# Patient Record
Sex: MALE | Race: WHITE | NOT HISPANIC OR LATINO
[De-identification: names, ages, dates, MRNs, and addresses within clinical notes are randomized per-mention and may not be internally consistent; named-entity substitution may affect disease eponyms.]

---

## 2018-07-23 ENCOUNTER — RX RENEWAL (OUTPATIENT)
Age: 68
End: 2018-07-23

## 2018-07-23 ENCOUNTER — APPOINTMENT (OUTPATIENT)
Dept: HEART AND VASCULAR | Facility: CLINIC | Age: 68
End: 2018-07-23
Payer: MEDICARE

## 2018-07-23 ENCOUNTER — LABORATORY RESULT (OUTPATIENT)
Age: 68
End: 2018-07-23

## 2018-07-23 VITALS — DIASTOLIC BLOOD PRESSURE: 78 MMHG | SYSTOLIC BLOOD PRESSURE: 102 MMHG | RESPIRATION RATE: 12 BRPM | HEART RATE: 88 BPM

## 2018-07-23 DIAGNOSIS — Z78.9 OTHER SPECIFIED HEALTH STATUS: ICD-10-CM

## 2018-07-23 PROCEDURE — 99214 OFFICE O/P EST MOD 30 MIN: CPT

## 2018-07-23 NOTE — HISTORY OF PRESENT ILLNESS
[FreeTextEntry8] : 67-year-old male with a past medical history of BPH reports one day of dysuria, urinary frequency and burning. No fever. Patient reports the burning is in the rectum. Patient has been seen before by urology for this.

## 2018-07-23 NOTE — PHYSICAL EXAM
[No Acute Distress] : no acute distress [Well Nourished] : well nourished [Normal Sclera/Conjunctiva] : normal sclera/conjunctiva [Normal Outer Ear/Nose] : the outer ears and nose were normal in appearance [No Respiratory Distress] : no respiratory distress  [Clear to Auscultation] : lungs were clear to auscultation bilaterally [No Accessory Muscle Use] : no accessory muscle use [Normal Rate] : normal rate  [Regular Rhythm] : with a regular rhythm [Normal S1, S2] : normal S1 and S2 [No Stool to Guaiac] : no stool to guaiac [No Joint Swelling] : no joint swelling [Normal Gait] : normal gait [Coordination Grossly Intact] : coordination grossly intact [Speech Grossly Normal] : speech grossly normal [Normal Mood] : the mood was normal [FreeTextEntry1] : very enlarged non-tender prostate

## 2018-07-23 NOTE — PLAN
[FreeTextEntry1] : 1. Dysuria: Will get a urinalysis and urine culture and advise. Will prescribe levofloxacin 500 mg once a day. Advised to followup with urology\par 2. BPH: Flomax unlikely to be enough, would consider adding Proscar however concerned about lowering the blood pressure too much. Will first few with antibiotics and consider adding a second alpha blocker once infection resolves

## 2018-07-24 LAB
APPEARANCE: ABNORMAL
BILIRUBIN URINE: ABNORMAL
BLOOD URINE: ABNORMAL
COLOR: ABNORMAL
GLUCOSE QUALITATIVE U: NEGATIVE MG/DL
KETONES URINE: NEGATIVE
LEUKOCYTE ESTERASE URINE: ABNORMAL
NITRITE URINE: NEGATIVE
PH URINE: 5
PROTEIN URINE: 100 MG/DL
SPECIFIC GRAVITY URINE: 1.03
UROBILINOGEN URINE: NEGATIVE MG/DL

## 2018-07-26 LAB — BACTERIA UR CULT: ABNORMAL

## 2018-09-06 ENCOUNTER — APPOINTMENT (OUTPATIENT)
Dept: HEART AND VASCULAR | Facility: CLINIC | Age: 68
End: 2018-09-06
Payer: MEDICARE

## 2018-09-06 ENCOUNTER — LABORATORY RESULT (OUTPATIENT)
Age: 68
End: 2018-09-06

## 2018-09-06 VITALS — SYSTOLIC BLOOD PRESSURE: 115 MMHG | DIASTOLIC BLOOD PRESSURE: 75 MMHG

## 2018-09-06 PROCEDURE — 36415 COLL VENOUS BLD VENIPUNCTURE: CPT

## 2018-09-06 PROCEDURE — 99211 OFF/OP EST MAY X REQ PHY/QHP: CPT | Mod: 25

## 2018-09-12 ENCOUNTER — APPOINTMENT (OUTPATIENT)
Dept: HEART AND VASCULAR | Facility: CLINIC | Age: 68
End: 2018-09-12

## 2018-09-12 LAB
25(OH)D3 SERPL-MCNC: 31.7 NG/ML
ALBUMIN SERPL ELPH-MCNC: 5 G/DL
ALP BLD-CCNC: 52 U/L
ALT SERPL-CCNC: 16 U/L
ANION GAP SERPL CALC-SCNC: 17 MMOL/L
AST SERPL-CCNC: 16 U/L
BASOPHILS # BLD AUTO: 0 K/UL
BASOPHILS NFR BLD AUTO: 0 %
BILIRUB SERPL-MCNC: 0.5 MG/DL
BUN SERPL-MCNC: 12 MG/DL
CALCIUM SERPL-MCNC: 9.6 MG/DL
CHLORIDE SERPL-SCNC: 102 MMOL/L
CHOLEST SERPL-MCNC: 185 MG/DL
CHOLEST/HDLC SERPL: 4.5 RATIO
CO2 SERPL-SCNC: 25 MMOL/L
CREAT SERPL-MCNC: 0.84 MG/DL
EOSINOPHIL # BLD AUTO: 0 K/UL
EOSINOPHIL NFR BLD AUTO: 0 %
GLUCOSE SERPL-MCNC: 93 MG/DL
HBA1C MFR BLD HPLC: 5.5 %
HCT VFR BLD CALC: 47.4 %
HDLC SERPL-MCNC: 41 MG/DL
HGB BLD-MCNC: 15.2 G/DL
LDLC SERPL CALC-MCNC: 103 MG/DL
LYMPHOCYTES # BLD AUTO: 19.62 K/UL
LYMPHOCYTES NFR BLD AUTO: 85.2 %
MAN DIFF?: NORMAL
MCHC RBC-ENTMCNC: 31.6 PG
MCHC RBC-ENTMCNC: 32.1 GM/DL
MCV RBC AUTO: 98.5 FL
MONOCYTES # BLD AUTO: 0.21 K/UL
MONOCYTES NFR BLD AUTO: 0.9 %
NEUTROPHILS # BLD AUTO: 3.2 K/UL
NEUTROPHILS NFR BLD AUTO: 13.9 %
PLATELET # BLD AUTO: 102 K/UL
POTASSIUM SERPL-SCNC: 4.3 MMOL/L
PROT SERPL-MCNC: 6.9 G/DL
PSA SERPL-MCNC: 8.44 NG/ML
RBC # BLD: 4.81 M/UL
RBC # FLD: 15.5 %
SODIUM SERPL-SCNC: 144 MMOL/L
T3 SERPL-MCNC: 114 NG/DL
T3FREE SERPL-MCNC: 3.25 PG/ML
T4 FREE SERPL-MCNC: 1.4 NG/DL
T4 SERPL-MCNC: 7.9 UG/DL
TRIGL SERPL-MCNC: 204 MG/DL
TSH SERPL-ACNC: 4.1 UIU/ML
WBC # FLD AUTO: 23.03 K/UL

## 2018-10-18 ENCOUNTER — APPOINTMENT (OUTPATIENT)
Dept: HEART AND VASCULAR | Facility: CLINIC | Age: 68
End: 2018-10-18
Payer: MEDICARE

## 2018-10-18 VITALS
RESPIRATION RATE: 14 BRPM | WEIGHT: 176 LBS | HEIGHT: 63 IN | BODY MASS INDEX: 31.18 KG/M2 | DIASTOLIC BLOOD PRESSURE: 76 MMHG | HEART RATE: 84 BPM | SYSTOLIC BLOOD PRESSURE: 118 MMHG

## 2018-10-18 DIAGNOSIS — Z82.49 FAMILY HISTORY OF ISCHEMIC HEART DISEASE AND OTHER DISEASES OF THE CIRCULATORY SYSTEM: ICD-10-CM

## 2018-10-18 DIAGNOSIS — Z87.891 PERSONAL HISTORY OF NICOTINE DEPENDENCE: ICD-10-CM

## 2018-10-18 DIAGNOSIS — Z78.9 OTHER SPECIFIED HEALTH STATUS: ICD-10-CM

## 2018-10-18 PROCEDURE — 93306 TTE W/DOPPLER COMPLETE: CPT

## 2018-10-18 PROCEDURE — 93880 EXTRACRANIAL BILAT STUDY: CPT

## 2018-10-18 PROCEDURE — 99214 OFFICE O/P EST MOD 30 MIN: CPT

## 2018-10-18 PROCEDURE — 93000 ELECTROCARDIOGRAM COMPLETE: CPT

## 2019-02-05 ENCOUNTER — APPOINTMENT (OUTPATIENT)
Dept: HEART AND VASCULAR | Facility: CLINIC | Age: 69
End: 2019-02-05
Payer: MEDICARE

## 2019-02-05 ENCOUNTER — LABORATORY RESULT (OUTPATIENT)
Age: 69
End: 2019-02-05

## 2019-02-05 VITALS — SYSTOLIC BLOOD PRESSURE: 120 MMHG | DIASTOLIC BLOOD PRESSURE: 75 MMHG

## 2019-02-05 PROCEDURE — 36415 COLL VENOUS BLD VENIPUNCTURE: CPT

## 2019-02-06 LAB
ALBUMIN SERPL ELPH-MCNC: 5.1 G/DL
ALP BLD-CCNC: 49 U/L
ALT SERPL-CCNC: 12 U/L
ANION GAP SERPL CALC-SCNC: 14 MMOL/L
AST SERPL-CCNC: 13 U/L
BASOPHILS # BLD AUTO: 0 K/UL
BASOPHILS NFR BLD AUTO: 0 %
BILIRUB SERPL-MCNC: 0.6 MG/DL
BUN SERPL-MCNC: 20 MG/DL
CALCIUM SERPL-MCNC: 9.7 MG/DL
CHLORIDE SERPL-SCNC: 104 MMOL/L
CHOLEST SERPL-MCNC: 193 MG/DL
CHOLEST/HDLC SERPL: 4.5 RATIO
CO2 SERPL-SCNC: 25 MMOL/L
CREAT SERPL-MCNC: 0.82 MG/DL
EOSINOPHIL # BLD AUTO: 0 K/UL
EOSINOPHIL NFR BLD AUTO: 0 %
GLUCOSE SERPL-MCNC: 109 MG/DL
HBA1C MFR BLD HPLC: 5.5 %
HCT VFR BLD CALC: 49 %
HDLC SERPL-MCNC: 43 MG/DL
HGB BLD-MCNC: 14.9 G/DL
LDLC SERPL CALC-MCNC: 125 MG/DL
LYMPHOCYTES # BLD AUTO: 35.2 K/UL
LYMPHOCYTES NFR BLD AUTO: 91 %
MAN DIFF?: NORMAL
MCHC RBC-ENTMCNC: 30.4 GM/DL
MCHC RBC-ENTMCNC: 30.5 PG
MCV RBC AUTO: 100.2 FL
MONOCYTES # BLD AUTO: 0.39 K/UL
MONOCYTES NFR BLD AUTO: 1 %
NEUTROPHILS # BLD AUTO: 3.09 K/UL
NEUTROPHILS NFR BLD AUTO: 8 %
PLATELET # BLD AUTO: 99 K/UL
POTASSIUM SERPL-SCNC: 4.3 MMOL/L
PROT SERPL-MCNC: 6.7 G/DL
RBC # BLD: 4.89 M/UL
RBC # FLD: 14.6 %
SODIUM SERPL-SCNC: 143 MMOL/L
TRIGL SERPL-MCNC: 126 MG/DL
WBC # FLD AUTO: 38.68 K/UL

## 2019-02-07 LAB
PSA SERPL-MCNC: 7.01 NG/ML
T3 SERPL-MCNC: 94 NG/DL
T3FREE SERPL-MCNC: 2.88 PG/ML
T4 FREE SERPL-MCNC: 1.4 NG/DL
T4 SERPL-MCNC: 8.1 UG/DL
TSH SERPL-ACNC: 3.22 UIU/ML

## 2019-02-26 ENCOUNTER — APPOINTMENT (OUTPATIENT)
Dept: HEART AND VASCULAR | Facility: CLINIC | Age: 69
End: 2019-02-26
Payer: MEDICARE

## 2019-02-26 VITALS
HEIGHT: 63 IN | BODY MASS INDEX: 29.06 KG/M2 | WEIGHT: 164 LBS | RESPIRATION RATE: 14 BRPM | DIASTOLIC BLOOD PRESSURE: 78 MMHG | HEART RATE: 84 BPM | SYSTOLIC BLOOD PRESSURE: 126 MMHG

## 2019-02-26 PROCEDURE — 99214 OFFICE O/P EST MOD 30 MIN: CPT

## 2019-02-26 PROCEDURE — 93000 ELECTROCARDIOGRAM COMPLETE: CPT

## 2019-02-26 NOTE — HISTORY OF PRESENT ILLNESS
[FreeTextEntry1] : Denies Chest Pain, SOB, Dizziness, Leg edema, Orthopnea, PND, Palpitations, Syncope, JOSHI, Diaphoresis, unchanged clinical status\par

## 2019-02-26 NOTE — PHYSICAL EXAM
[General Appearance - Well Developed] : well developed [Normal Appearance] : normal appearance [Well Groomed] : well groomed [General Appearance - Well Nourished] : well nourished [No Deformities] : no deformities [General Appearance - In No Acute Distress] : no acute distress [Normal Conjunctiva] : the conjunctiva exhibited no abnormalities [Eyelids - No Xanthelasma] : the eyelids demonstrated no xanthelasmas [Normal Oral Mucosa] : normal oral mucosa [No Oral Pallor] : no oral pallor [No Oral Cyanosis] : no oral cyanosis [Normal Jugular Venous A Waves Present] : normal jugular venous A waves present [Normal Jugular Venous V Waves Present] : normal jugular venous V waves present [No Jugular Venous Matta A Waves] : no jugular venous matta A waves [Respiration, Rhythm And Depth] : normal respiratory rhythm and effort [Exaggerated Use Of Accessory Muscles For Inspiration] : no accessory muscle use [Auscultation Breath Sounds / Voice Sounds] : lungs were clear to auscultation bilaterally [Heart Rate And Rhythm] : heart rate and rhythm were normal [Heart Sounds] : normal S1 and S2 [Systolic grade ___/6] : A grade [unfilled]/6 systolic murmur was heard. [Abdomen Soft] : soft [Abdomen Tenderness] : non-tender [Abdomen Mass (___ Cm)] : no abdominal mass palpated [Abnormal Walk] : normal gait [Nail Clubbing] : no clubbing of the fingernails [Cyanosis, Localized] : no localized cyanosis [Petechial Hemorrhages (___cm)] : no petechial hemorrhages [Skin Color & Pigmentation] : normal skin color and pigmentation [] : no rash [No Venous Stasis] : no venous stasis [Skin Lesions] : no skin lesions [No Skin Ulcers] : no skin ulcer [No Xanthoma] : no  xanthoma was observed [Oriented To Time, Place, And Person] : oriented to person, place, and time [Affect] : the affect was normal [Mood] : the mood was normal [No Anxiety] : not feeling anxious

## 2019-02-26 NOTE — DISCUSSION/SUMMARY
[Hyperlipidemia] : hyperlipidemia [Diet Modification] : diet modification [Exercise] : exercise [Mitral Regurgitation] : mitral regurgitation [Stable] : stable [None] : none [Sodium Restriction] : sodium restriction [Patient] : the patient [FreeTextEntry1] : Blood work reviewed with pt. Maintain a low caloric, low sodium, low cholesterol  diet. Dietary counseling given, diet and exercise discussed in detail.\par Elevated PSA- pt. has f/u with .\par CLL-pt. to see Heme/Onc in 2 weeks.\par

## 2019-02-26 NOTE — REASON FOR VISIT
[Follow-Up - Clinic] : a clinic follow-up of [Hyperlipidemia] : hyperlipidemia [Mitral Regurgitation] : mitral regurgitation

## 2019-04-03 ENCOUNTER — RX RENEWAL (OUTPATIENT)
Age: 69
End: 2019-04-03

## 2019-04-04 ENCOUNTER — RX RENEWAL (OUTPATIENT)
Age: 69
End: 2019-04-04

## 2019-04-09 ENCOUNTER — RX RENEWAL (OUTPATIENT)
Age: 69
End: 2019-04-09

## 2019-10-08 ENCOUNTER — APPOINTMENT (OUTPATIENT)
Dept: HEART AND VASCULAR | Facility: CLINIC | Age: 69
End: 2019-10-08

## 2019-10-10 ENCOUNTER — LABORATORY RESULT (OUTPATIENT)
Age: 69
End: 2019-10-10

## 2019-10-16 ENCOUNTER — APPOINTMENT (OUTPATIENT)
Dept: HEART AND VASCULAR | Facility: CLINIC | Age: 69
End: 2019-10-16
Payer: MEDICARE

## 2019-10-16 ENCOUNTER — NON-APPOINTMENT (OUTPATIENT)
Age: 69
End: 2019-10-16

## 2019-10-16 VITALS
HEART RATE: 71 BPM | SYSTOLIC BLOOD PRESSURE: 120 MMHG | WEIGHT: 170 LBS | HEIGHT: 63 IN | BODY MASS INDEX: 30.12 KG/M2 | DIASTOLIC BLOOD PRESSURE: 70 MMHG

## 2019-10-16 DIAGNOSIS — Z87.898 PERSONAL HISTORY OF OTHER SPECIFIED CONDITIONS: ICD-10-CM

## 2019-10-16 PROCEDURE — 93000 ELECTROCARDIOGRAM COMPLETE: CPT

## 2019-10-16 PROCEDURE — 99214 OFFICE O/P EST MOD 30 MIN: CPT

## 2019-10-17 NOTE — PHYSICAL EXAM
[General Appearance - Well Developed] : well developed [Well Groomed] : well groomed [Normal Appearance] : normal appearance [No Deformities] : no deformities [General Appearance - Well Nourished] : well nourished [General Appearance - In No Acute Distress] : no acute distress [Normal Conjunctiva] : the conjunctiva exhibited no abnormalities [Normal Oral Mucosa] : normal oral mucosa [Eyelids - No Xanthelasma] : the eyelids demonstrated no xanthelasmas [No Oral Cyanosis] : no oral cyanosis [No Oral Pallor] : no oral pallor [Normal Jugular Venous V Waves Present] : normal jugular venous V waves present [Normal Jugular Venous A Waves Present] : normal jugular venous A waves present [No Jugular Venous Matta A Waves] : no jugular venous matta A waves [Auscultation Breath Sounds / Voice Sounds] : lungs were clear to auscultation bilaterally [Exaggerated Use Of Accessory Muscles For Inspiration] : no accessory muscle use [Respiration, Rhythm And Depth] : normal respiratory rhythm and effort [Heart Sounds] : normal S1 and S2 [Heart Rate And Rhythm] : heart rate and rhythm were normal [Systolic grade ___/6] : A grade [unfilled]/6 systolic murmur was heard. [Abdomen Soft] : soft [Abdomen Mass (___ Cm)] : no abdominal mass palpated [Abdomen Tenderness] : non-tender [Abnormal Walk] : normal gait [Nail Clubbing] : no clubbing of the fingernails [Cyanosis, Localized] : no localized cyanosis [Petechial Hemorrhages (___cm)] : no petechial hemorrhages [Skin Color & Pigmentation] : normal skin color and pigmentation [] : no rash [No Venous Stasis] : no venous stasis [Skin Lesions] : no skin lesions [No Skin Ulcers] : no skin ulcer [No Xanthoma] : no  xanthoma was observed [Oriented To Time, Place, And Person] : oriented to person, place, and time [Affect] : the affect was normal [Mood] : the mood was normal [No Anxiety] : not feeling anxious

## 2019-10-17 NOTE — REASON FOR VISIT
[Mitral Regurgitation] : mitral regurgitation [Hyperlipidemia] : hyperlipidemia [Follow-Up - Clinic] : a clinic follow-up of

## 2019-10-17 NOTE — DISCUSSION/SUMMARY
[Hyperlipidemia] : hyperlipidemia [Diet Modification] : diet modification [Mitral Regurgitation] : mitral regurgitation [Stable] : stable [Sodium Restriction] : sodium restriction [None] : none [Patient] : the patient [FreeTextEntry1] : Blood work reviewed with pt. Maintain a low caloric, low sodium, low cholesterol  diet. Dietary counseling given, diet and exercise discussed in detail.\par Discussed at length need to f/u with heme/Onc for rising WBC count (h/o CLL).\par

## 2019-10-17 NOTE — HISTORY OF PRESENT ILLNESS
[FreeTextEntry1] : Denies Chest Pain, SOB, Dizziness, Leg edema, Orthopnea, PND, Palpitations, Syncope, JOSHI, Diaphoresis.\par

## 2020-02-13 ENCOUNTER — LABORATORY RESULT (OUTPATIENT)
Age: 70
End: 2020-02-13

## 2020-02-13 ENCOUNTER — APPOINTMENT (OUTPATIENT)
Dept: HEART AND VASCULAR | Facility: CLINIC | Age: 70
End: 2020-02-13
Payer: MEDICARE

## 2020-02-13 VITALS — SYSTOLIC BLOOD PRESSURE: 120 MMHG | DIASTOLIC BLOOD PRESSURE: 70 MMHG

## 2020-02-13 PROCEDURE — 36415 COLL VENOUS BLD VENIPUNCTURE: CPT

## 2020-02-17 ENCOUNTER — NON-APPOINTMENT (OUTPATIENT)
Age: 70
End: 2020-02-17

## 2020-02-17 ENCOUNTER — APPOINTMENT (OUTPATIENT)
Dept: HEART AND VASCULAR | Facility: CLINIC | Age: 70
End: 2020-02-17
Payer: MEDICARE

## 2020-02-17 VITALS
DIASTOLIC BLOOD PRESSURE: 75 MMHG | BODY MASS INDEX: 30.83 KG/M2 | WEIGHT: 174 LBS | HEART RATE: 103 BPM | RESPIRATION RATE: 14 BRPM | SYSTOLIC BLOOD PRESSURE: 122 MMHG | HEIGHT: 63 IN

## 2020-02-17 DIAGNOSIS — G47.00 INSOMNIA, UNSPECIFIED: ICD-10-CM

## 2020-02-17 LAB
ALBUMIN SERPL ELPH-MCNC: 5 G/DL
ALP BLD-CCNC: 62 U/L
ALT SERPL-CCNC: 13 U/L
ANION GAP SERPL CALC-SCNC: 13 MMOL/L
AST SERPL-CCNC: 11 U/L
BASOPHILS # BLD AUTO: 0.27 K/UL
BASOPHILS NFR BLD AUTO: 0.4 %
BILIRUB SERPL-MCNC: 0.4 MG/DL
BUN SERPL-MCNC: 21 MG/DL
CALCIUM SERPL-MCNC: 9.5 MG/DL
CHLORIDE SERPL-SCNC: 105 MMOL/L
CHOLEST SERPL-MCNC: 178 MG/DL
CHOLEST/HDLC SERPL: 5 RATIO
CO2 SERPL-SCNC: 24 MMOL/L
CREAT SERPL-MCNC: 0.9 MG/DL
EOSINOPHIL # BLD AUTO: 0.15 K/UL
EOSINOPHIL NFR BLD AUTO: 0.2 %
ESTIMATED AVERAGE GLUCOSE: 117 MG/DL
GLUCOSE SERPL-MCNC: 117 MG/DL
HBA1C MFR BLD HPLC: 5.7 %
HCT VFR BLD CALC: 46 %
HDLC SERPL-MCNC: 36 MG/DL
HGB BLD-MCNC: 14 G/DL
IMM GRANULOCYTES NFR BLD AUTO: 0.2 %
LDLC SERPL CALC-MCNC: 99 MG/DL
LYMPHOCYTES # BLD AUTO: 58.61 K/UL
LYMPHOCYTES NFR BLD AUTO: 89.5 %
MAN DIFF?: NORMAL
MCHC RBC-ENTMCNC: 30.2 PG
MCHC RBC-ENTMCNC: 30.4 GM/DL
MCV RBC AUTO: 99.4 FL
MONOCYTES # BLD AUTO: 1.17 K/UL
MONOCYTES NFR BLD AUTO: 1.8 %
NEUTROPHILS # BLD AUTO: 5.12 K/UL
NEUTROPHILS NFR BLD AUTO: 7.9 %
PLATELET # BLD AUTO: 123 K/UL
POTASSIUM SERPL-SCNC: 4.4 MMOL/L
PROT SERPL-MCNC: 6.6 G/DL
PSA FREE FLD-MCNC: 18 %
PSA FREE SERPL-MCNC: 0.76 NG/ML
PSA SERPL-MCNC: 4.19 NG/ML
RBC # BLD: 4.63 M/UL
RBC # FLD: 14.2 %
SODIUM SERPL-SCNC: 142 MMOL/L
T3 SERPL-MCNC: 109 NG/DL
T3FREE SERPL-MCNC: 3.11 PG/ML
T4 FREE SERPL-MCNC: 1.3 NG/DL
T4 SERPL-MCNC: 7.5 UG/DL
TRIGL SERPL-MCNC: 218 MG/DL
TSH SERPL-ACNC: 3.18 UIU/ML
WBC # FLD AUTO: 65.46 K/UL

## 2020-02-17 PROCEDURE — 99214 OFFICE O/P EST MOD 30 MIN: CPT | Mod: 25

## 2020-02-17 PROCEDURE — 93000 ELECTROCARDIOGRAM COMPLETE: CPT

## 2020-02-17 PROCEDURE — 93306 TTE W/DOPPLER COMPLETE: CPT

## 2020-02-17 PROCEDURE — 93880 EXTRACRANIAL BILAT STUDY: CPT

## 2020-02-17 NOTE — PHYSICAL EXAM
[Well Groomed] : well groomed [General Appearance - Well Developed] : well developed [Normal Appearance] : normal appearance [General Appearance - Well Nourished] : well nourished [No Deformities] : no deformities [General Appearance - In No Acute Distress] : no acute distress [Normal Conjunctiva] : the conjunctiva exhibited no abnormalities [Eyelids - No Xanthelasma] : the eyelids demonstrated no xanthelasmas [No Oral Pallor] : no oral pallor [No Oral Cyanosis] : no oral cyanosis [Normal Oral Mucosa] : normal oral mucosa [Normal Jugular Venous A Waves Present] : normal jugular venous A waves present [Normal Jugular Venous V Waves Present] : normal jugular venous V waves present [No Jugular Venous Matta A Waves] : no jugular venous matta A waves [Exaggerated Use Of Accessory Muscles For Inspiration] : no accessory muscle use [Respiration, Rhythm And Depth] : normal respiratory rhythm and effort [Heart Sounds] : normal S1 and S2 [Auscultation Breath Sounds / Voice Sounds] : lungs were clear to auscultation bilaterally [Heart Rate And Rhythm] : heart rate and rhythm were normal [Abdomen Soft] : soft [Systolic grade ___/6] : A grade [unfilled]/6 systolic murmur was heard. [Abdomen Tenderness] : non-tender [Abdomen Mass (___ Cm)] : no abdominal mass palpated [Nail Clubbing] : no clubbing of the fingernails [Abnormal Walk] : normal gait [Cyanosis, Localized] : no localized cyanosis [Petechial Hemorrhages (___cm)] : no petechial hemorrhages [] : no rash [Skin Color & Pigmentation] : normal skin color and pigmentation [No Skin Ulcers] : no skin ulcer [No Venous Stasis] : no venous stasis [Skin Lesions] : no skin lesions [No Xanthoma] : no  xanthoma was observed [Oriented To Time, Place, And Person] : oriented to person, place, and time [Affect] : the affect was normal [Mood] : the mood was normal [No Anxiety] : not feeling anxious

## 2020-02-17 NOTE — DISCUSSION/SUMMARY
[Hyperlipidemia] : hyperlipidemia [Diet Modification] : diet modification [Mitral Regurgitation] : mitral regurgitation [Family] : the patient's family [Sodium Restriction] : sodium restriction [None] : none [Stable] : stable [FreeTextEntry1] : Carotid artery disease- Stable; no further intervention at this time.\par Blood work reviewed with pt. Pt. with WBC> 65K; pt. to f/u with Heme for CLL. Maintain a low caloric, low sodium, low cholesterol  diet. Dietary counseling given, diet and exercise discussed in detail.\par  [Patient] : the patient

## 2020-02-17 NOTE — HISTORY OF PRESENT ILLNESS
[FreeTextEntry1] : Denies Chest Pain, SOB, Dizziness, Leg edema, Orthopnea, PND, Palpitations, Syncope, JOSHI, Diaphoresis.\par Echo ordered as routine surveillance (>1yr.) of moderate or severe valvular regurgitation without change in clinical status or cardiac exam (Echo AUC criteria -J.Am Soc of Echo 2011: 24:236)\par

## 2020-02-17 NOTE — REASON FOR VISIT
[Follow-Up - Clinic] : a clinic follow-up of [Hyperlipidemia] : hyperlipidemia [Mitral Regurgitation] : mitral regurgitation [FreeTextEntry1] : and known carotid artery disease.

## 2020-02-19 ENCOUNTER — APPOINTMENT (OUTPATIENT)
Dept: HEART AND VASCULAR | Facility: CLINIC | Age: 70
End: 2020-02-19

## 2020-06-16 ENCOUNTER — APPOINTMENT (OUTPATIENT)
Dept: HEART AND VASCULAR | Facility: CLINIC | Age: 70
End: 2020-06-16

## 2020-06-22 ENCOUNTER — APPOINTMENT (OUTPATIENT)
Dept: HEART AND VASCULAR | Facility: CLINIC | Age: 70
End: 2020-06-22

## 2020-08-12 ENCOUNTER — NON-APPOINTMENT (OUTPATIENT)
Age: 70
End: 2020-08-12

## 2020-08-12 ENCOUNTER — APPOINTMENT (OUTPATIENT)
Dept: HEART AND VASCULAR | Facility: CLINIC | Age: 70
End: 2020-08-12
Payer: MEDICARE

## 2020-08-12 VITALS
WEIGHT: 174 LBS | DIASTOLIC BLOOD PRESSURE: 75 MMHG | HEART RATE: 75 BPM | RESPIRATION RATE: 14 BRPM | BODY MASS INDEX: 30.83 KG/M2 | HEIGHT: 63 IN | SYSTOLIC BLOOD PRESSURE: 120 MMHG

## 2020-08-12 PROCEDURE — 93000 ELECTROCARDIOGRAM COMPLETE: CPT

## 2020-08-12 PROCEDURE — 99214 OFFICE O/P EST MOD 30 MIN: CPT

## 2020-08-12 NOTE — DISCUSSION/SUMMARY
[Hyperlipidemia] : hyperlipidemia [Diet Modification] : diet modification [Mitral Regurgitation] : mitral regurgitation [Stable] : stable [None] : none [Sodium Restriction] : sodium restriction [Patient] : the patient [FreeTextEntry1] : CLL- Blood work reviewed with pt. WBC>50K; has f/u with heme/onc next week.\par Maintain a low caloric, low sodium, low cholesterol  diet. Dietary counseling given, diet and exercise discussed in detail.\par

## 2020-08-12 NOTE — PHYSICAL EXAM
[General Appearance - Well Developed] : well developed [Normal Appearance] : normal appearance [General Appearance - Well Nourished] : well nourished [Well Groomed] : well groomed [No Deformities] : no deformities [General Appearance - In No Acute Distress] : no acute distress [Eyelids - No Xanthelasma] : the eyelids demonstrated no xanthelasmas [Normal Conjunctiva] : the conjunctiva exhibited no abnormalities [Normal Oral Mucosa] : normal oral mucosa [No Oral Pallor] : no oral pallor [Normal Jugular Venous A Waves Present] : normal jugular venous A waves present [No Oral Cyanosis] : no oral cyanosis [Normal Jugular Venous V Waves Present] : normal jugular venous V waves present [No Jugular Venous Matta A Waves] : no jugular venous matta A waves [Respiration, Rhythm And Depth] : normal respiratory rhythm and effort [Exaggerated Use Of Accessory Muscles For Inspiration] : no accessory muscle use [Heart Rate And Rhythm] : heart rate and rhythm were normal [Auscultation Breath Sounds / Voice Sounds] : lungs were clear to auscultation bilaterally [Heart Sounds] : normal S1 and S2 [Systolic grade ___/6] : A grade [unfilled]/6 systolic murmur was heard. [Abdomen Soft] : soft [Abdomen Tenderness] : non-tender [Abdomen Mass (___ Cm)] : no abdominal mass palpated [Nail Clubbing] : no clubbing of the fingernails [Abnormal Walk] : normal gait [Petechial Hemorrhages (___cm)] : no petechial hemorrhages [Cyanosis, Localized] : no localized cyanosis [Skin Color & Pigmentation] : normal skin color and pigmentation [] : no rash [No Venous Stasis] : no venous stasis [Skin Lesions] : no skin lesions [No Skin Ulcers] : no skin ulcer [No Xanthoma] : no  xanthoma was observed [Oriented To Time, Place, And Person] : oriented to person, place, and time [Mood] : the mood was normal [Affect] : the affect was normal [No Anxiety] : not feeling anxious

## 2020-12-29 ENCOUNTER — APPOINTMENT (OUTPATIENT)
Dept: HEART AND VASCULAR | Facility: CLINIC | Age: 70
End: 2020-12-29
Payer: MEDICARE

## 2020-12-29 ENCOUNTER — LABORATORY RESULT (OUTPATIENT)
Age: 70
End: 2020-12-29

## 2020-12-29 ENCOUNTER — NON-APPOINTMENT (OUTPATIENT)
Age: 70
End: 2020-12-29

## 2020-12-29 VITALS
BODY MASS INDEX: 30.48 KG/M2 | HEART RATE: 78 BPM | SYSTOLIC BLOOD PRESSURE: 120 MMHG | RESPIRATION RATE: 14 BRPM | WEIGHT: 172 LBS | DIASTOLIC BLOOD PRESSURE: 70 MMHG | HEIGHT: 63 IN

## 2020-12-29 DIAGNOSIS — C91.10 CHRONIC LYMPHOCYTIC LEUKEMIA OF B-CELL TYPE NOT HAVING ACHIEVED REMISSION: ICD-10-CM

## 2020-12-29 PROCEDURE — 93000 ELECTROCARDIOGRAM COMPLETE: CPT

## 2020-12-29 PROCEDURE — 99214 OFFICE O/P EST MOD 30 MIN: CPT

## 2020-12-29 PROCEDURE — 99072 ADDL SUPL MATRL&STAF TM PHE: CPT

## 2020-12-29 RX ORDER — MUPIROCIN 20 MG/G
2 OINTMENT TOPICAL
Qty: 1 | Refills: 0 | Status: ACTIVE | COMMUNITY
Start: 2020-12-29 | End: 1900-01-01

## 2020-12-29 NOTE — HISTORY OF PRESENT ILLNESS
[FreeTextEntry1] : The patient burned his left forearm with oil 5 days ago.  Skin was blistered at the time.  Feels better now.  \par He also complains of several weeks of somewhat more JOSHI.  No PND or orthopnea.  No chest pain.

## 2020-12-29 NOTE — DISCUSSION/SUMMARY
[FreeTextEntry1] : Will Rx burn with Bactroban x 3 days to prevent infection.\par Will check CBC to rule out anemia.  Maintain low sodium diet.  Maintain on current medical regimen for now.  \par

## 2020-12-29 NOTE — ASSESSMENT
[FreeTextEntry1] : EKG:  Sinus rhythm, No change from prior EKG.\par Echo:  Normal LV function, mild MR\par Hemodynamically stable.  No signs of CHF on exam.  BP well controlled\par Has somewhat increased dyspnea.  Unclear etiology.\par Has burn left UE.  Does not look infected.

## 2020-12-29 NOTE — PHYSICAL EXAM
[General Appearance - Well Developed] : well developed [Normal Appearance] : normal appearance [Well Groomed] : well groomed [General Appearance - Well Nourished] : well nourished [No Deformities] : no deformities [General Appearance - In No Acute Distress] : no acute distress [Normal Conjunctiva] : the conjunctiva exhibited no abnormalities [Eyelids - No Xanthelasma] : the eyelids demonstrated no xanthelasmas [Normal Oral Mucosa] : normal oral mucosa [No Oral Pallor] : no oral pallor [No Oral Cyanosis] : no oral cyanosis [Heart Rate And Rhythm] : heart rate and rhythm were normal [Heart Sounds] : normal S1 and S2 [Abdomen Soft] : soft [Abdomen Tenderness] : non-tender [Abdomen Mass (___ Cm)] : no abdominal mass palpated [Abnormal Walk] : normal gait [Gait - Sufficient For Exercise Testing] : the gait was sufficient for exercise testing [Nail Clubbing] : no clubbing of the fingernails [Cyanosis, Localized] : no localized cyanosis [Petechial Hemorrhages (___cm)] : no petechial hemorrhages [] : no ischemic changes [FreeTextEntry1] : large left forearm burn.  No pus or oozing.

## 2020-12-30 LAB
ALBUMIN SERPL ELPH-MCNC: 4.9 G/DL
ALP BLD-CCNC: 70 U/L
ALT SERPL-CCNC: 14 U/L
ANION GAP SERPL CALC-SCNC: 15 MMOL/L
AST SERPL-CCNC: 16 U/L
BILIRUB SERPL-MCNC: 0.4 MG/DL
BUN SERPL-MCNC: 26 MG/DL
CALCIUM SERPL-MCNC: 11.1 MG/DL
CHLORIDE SERPL-SCNC: 102 MMOL/L
CHOLEST SERPL-MCNC: 201 MG/DL
CO2 SERPL-SCNC: 26 MMOL/L
CREAT SERPL-MCNC: 1.19 MG/DL
ESTIMATED AVERAGE GLUCOSE: 117 MG/DL
GLUCOSE SERPL-MCNC: 129 MG/DL
HBA1C MFR BLD HPLC: 5.7 %
HDLC SERPL-MCNC: 30 MG/DL
LDLC SERPL CALC-MCNC: 108 MG/DL
NONHDLC SERPL-MCNC: 171 MG/DL
POTASSIUM SERPL-SCNC: 3.9 MMOL/L
PROT SERPL-MCNC: 6.5 G/DL
SODIUM SERPL-SCNC: 143 MMOL/L
T3 SERPL-MCNC: 95 NG/DL
T3FREE SERPL-MCNC: 2.76 PG/ML
T4 FREE SERPL-MCNC: 1.3 NG/DL
T4 SERPL-MCNC: 8.7 UG/DL
TRIGL SERPL-MCNC: 319 MG/DL
TSH SERPL-ACNC: 3.82 UIU/ML

## 2020-12-31 LAB
BASOPHILS # BLD AUTO: 0 K/UL
BASOPHILS NFR BLD AUTO: 0 %
EOSINOPHIL # BLD AUTO: 0 K/UL
EOSINOPHIL NFR BLD AUTO: 0 %
HCT VFR BLD CALC: 41.8 %
HGB BLD-MCNC: 13 G/DL
LYMPHOCYTES # BLD AUTO: 61.59 K/UL
LYMPHOCYTES NFR BLD AUTO: 92 %
MAN DIFF?: YES
MCHC RBC-ENTMCNC: 30.3 PG
MCHC RBC-ENTMCNC: 31.1 GM/DL
MCV RBC AUTO: 97.4 FL
MONOCYTES # BLD AUTO: 0.67 K/UL
MONOCYTES NFR BLD AUTO: 1 %
NEUTROPHILS # BLD AUTO: 2.68 K/UL
NEUTROPHILS NFR BLD AUTO: 4 %
PLATELET # BLD AUTO: 96 K/UL
RBC # BLD: 4.29 M/UL
RBC # FLD: 13.9 %
WBC # FLD AUTO: 66.95 K/UL

## 2021-01-27 ENCOUNTER — APPOINTMENT (OUTPATIENT)
Dept: HEART AND VASCULAR | Facility: CLINIC | Age: 71
End: 2021-01-27

## 2021-03-12 ENCOUNTER — NON-APPOINTMENT (OUTPATIENT)
Age: 71
End: 2021-03-12

## 2021-03-12 ENCOUNTER — APPOINTMENT (OUTPATIENT)
Dept: HEART AND VASCULAR | Facility: CLINIC | Age: 71
End: 2021-03-12
Payer: MEDICARE

## 2021-03-12 VITALS
RESPIRATION RATE: 12 BRPM | SYSTOLIC BLOOD PRESSURE: 120 MMHG | HEART RATE: 70 BPM | WEIGHT: 172 LBS | DIASTOLIC BLOOD PRESSURE: 76 MMHG | BODY MASS INDEX: 30.48 KG/M2 | HEIGHT: 63 IN

## 2021-03-12 DIAGNOSIS — R07.89 OTHER CHEST PAIN: ICD-10-CM

## 2021-03-12 PROCEDURE — 99214 OFFICE O/P EST MOD 30 MIN: CPT

## 2021-03-12 NOTE — HISTORY OF PRESENT ILLNESS
[FreeTextEntry1] : 70-year-old male with a past medical history of CLL GERD BPH comes in for evaluation of chest discomfort. Patient reports one month of left sided nonradiating chest discomfort began after leaning over to pick something off the ground. Since then patient has pain that comes and goes occasionally worse with exertional activity occasionally for his arrest seems to respond to acetaminophen. Patient denies any shortness of breath PND or orthopnea.

## 2021-03-12 NOTE — DISCUSSION/SUMMARY
[FreeTextEntry1] : 1. Chest discomfort: Likely musculoskeletal. Will refer for a chest x-ray advised alternating acetaminophen and NSAIDs q.6 hours p.r.n. pain and if symptoms persist will refer her for a exercise nuclear stress test. EKG.

## 2021-03-12 NOTE — PHYSICAL EXAM
[General Appearance - Well Developed] : well developed [Normal Appearance] : normal appearance [Well Groomed] : well groomed [General Appearance - Well Nourished] : well nourished [No Deformities] : no deformities [General Appearance - In No Acute Distress] : no acute distress [Normal Oral Mucosa] : normal oral mucosa [No Oral Pallor] : no oral pallor [No Oral Cyanosis] : no oral cyanosis [Normal Jugular Venous A Waves Present] : normal jugular venous A waves present [Normal Jugular Venous V Waves Present] : normal jugular venous V waves present [No Jugular Venous Matta A Waves] : no jugular venous matta A waves [Respiration, Rhythm And Depth] : normal respiratory rhythm and effort [Exaggerated Use Of Accessory Muscles For Inspiration] : no accessory muscle use [Auscultation Breath Sounds / Voice Sounds] : lungs were clear to auscultation bilaterally [Heart Rate And Rhythm] : heart rate and rhythm were normal [Heart Sounds] : normal S1 and S2 [Arterial Pulses Normal] : the arterial pulses were normal [Edema] : no peripheral edema present [Abdomen Soft] : soft [Abdomen Tenderness] : non-tender [Abdomen Mass (___ Cm)] : no abdominal mass palpated [Abnormal Walk] : normal gait [Gait - Sufficient For Exercise Testing] : the gait was sufficient for exercise testing [Nail Clubbing] : no clubbing of the fingernails [Cyanosis, Localized] : no localized cyanosis [Petechial Hemorrhages (___cm)] : no petechial hemorrhages [] : no ischemic changes [Oriented To Time, Place, And Person] : oriented to person, place, and time [Affect] : the affect was normal [Mood] : the mood was normal [No Anxiety] : not feeling anxious

## 2021-03-23 ENCOUNTER — INPATIENT (INPATIENT)
Facility: HOSPITAL | Age: 71
LOS: 2 days | Discharge: HOME | DRG: 194 | End: 2021-03-26
Attending: THORACIC SURGERY (CARDIOTHORACIC VASCULAR SURGERY) | Admitting: THORACIC SURGERY (CARDIOTHORACIC VASCULAR SURGERY)
Payer: MEDICARE

## 2021-03-23 VITALS
RESPIRATION RATE: 18 BRPM | DIASTOLIC BLOOD PRESSURE: 73 MMHG | WEIGHT: 171.08 LBS | SYSTOLIC BLOOD PRESSURE: 125 MMHG | HEART RATE: 87 BPM | OXYGEN SATURATION: 97 % | HEIGHT: 65 IN | TEMPERATURE: 99 F

## 2021-03-23 DIAGNOSIS — J18.9 PNEUMONIA, UNSPECIFIED ORGANISM: ICD-10-CM

## 2021-03-23 DIAGNOSIS — Z98.890 OTHER SPECIFIED POSTPROCEDURAL STATES: Chronic | ICD-10-CM

## 2021-03-23 DIAGNOSIS — C91.10 CHRONIC LYMPHOCYTIC LEUKEMIA OF B-CELL TYPE NOT HAVING ACHIEVED REMISSION: ICD-10-CM

## 2021-03-23 LAB
ALBUMIN SERPL ELPH-MCNC: 4.3 G/DL — SIGNIFICANT CHANGE UP (ref 3.3–5)
ALP SERPL-CCNC: 105 U/L — SIGNIFICANT CHANGE UP (ref 40–120)
ALT FLD-CCNC: 24 U/L — SIGNIFICANT CHANGE UP (ref 10–45)
ANION GAP SERPL CALC-SCNC: 13 MMOL/L — SIGNIFICANT CHANGE UP (ref 5–17)
AST SERPL-CCNC: 25 U/L — SIGNIFICANT CHANGE UP (ref 10–40)
BASOPHILS # BLD AUTO: 0 K/UL — SIGNIFICANT CHANGE UP (ref 0–0.2)
BASOPHILS NFR BLD AUTO: 0 % — SIGNIFICANT CHANGE UP (ref 0–2)
BILIRUB SERPL-MCNC: 0.4 MG/DL — SIGNIFICANT CHANGE UP (ref 0.2–1.2)
BUN SERPL-MCNC: 26 MG/DL — HIGH (ref 7–23)
CALCIUM SERPL-MCNC: 9.7 MG/DL — SIGNIFICANT CHANGE UP (ref 8.4–10.5)
CHLORIDE SERPL-SCNC: 101 MMOL/L — SIGNIFICANT CHANGE UP (ref 96–108)
CO2 SERPL-SCNC: 26 MMOL/L — SIGNIFICANT CHANGE UP (ref 22–31)
CREAT SERPL-MCNC: 1.01 MG/DL — SIGNIFICANT CHANGE UP (ref 0.5–1.3)
EOSINOPHIL # BLD AUTO: 1.53 K/UL — HIGH (ref 0–0.5)
EOSINOPHIL NFR BLD AUTO: 2 % — SIGNIFICANT CHANGE UP (ref 0–6)
GLUCOSE SERPL-MCNC: 112 MG/DL — HIGH (ref 70–99)
HCT VFR BLD CALC: 40.5 % — SIGNIFICANT CHANGE UP (ref 39–50)
HGB BLD-MCNC: 12.5 G/DL — LOW (ref 13–17)
LYMPHOCYTES # BLD AUTO: 61.77 K/UL — HIGH (ref 1–3.3)
LYMPHOCYTES # BLD AUTO: 81 % — HIGH (ref 13–44)
LYMPHOCYTES # SPEC AUTO: 10 % — HIGH (ref 0–0)
MANUAL SMEAR VERIFICATION: SIGNIFICANT CHANGE UP
MCHC RBC-ENTMCNC: 30.2 PG — SIGNIFICANT CHANGE UP (ref 27–34)
MCHC RBC-ENTMCNC: 30.9 GM/DL — LOW (ref 32–36)
MCV RBC AUTO: 97.8 FL — SIGNIFICANT CHANGE UP (ref 80–100)
MONOCYTES # BLD AUTO: 0.76 K/UL — SIGNIFICANT CHANGE UP (ref 0–0.9)
MONOCYTES NFR BLD AUTO: 1 % — LOW (ref 2–14)
NEUTROPHILS # BLD AUTO: 4.58 K/UL — SIGNIFICANT CHANGE UP (ref 1.8–7.4)
NEUTROPHILS NFR BLD AUTO: 6 % — LOW (ref 43–77)
NRBC # BLD: 0 /100 — SIGNIFICANT CHANGE UP (ref 0–0)
NT-PROBNP SERPL-SCNC: 29 PG/ML — SIGNIFICANT CHANGE UP (ref 0–300)
PLAT MORPH BLD: NORMAL — SIGNIFICANT CHANGE UP
PLATELET # BLD AUTO: 141 K/UL — LOW (ref 150–400)
POTASSIUM SERPL-MCNC: 4.2 MMOL/L — SIGNIFICANT CHANGE UP (ref 3.5–5.3)
POTASSIUM SERPL-SCNC: 4.2 MMOL/L — SIGNIFICANT CHANGE UP (ref 3.5–5.3)
PROT SERPL-MCNC: 6.9 G/DL — SIGNIFICANT CHANGE UP (ref 6–8.3)
RBC # BLD: 4.14 M/UL — LOW (ref 4.2–5.8)
RBC # FLD: 13.9 % — SIGNIFICANT CHANGE UP (ref 10.3–14.5)
RBC BLD AUTO: SIGNIFICANT CHANGE UP
SARS-COV-2 RNA SPEC QL NAA+PROBE: SIGNIFICANT CHANGE UP
SMUDGE CELLS # BLD: PRESENT — SIGNIFICANT CHANGE UP
SODIUM SERPL-SCNC: 140 MMOL/L — SIGNIFICANT CHANGE UP (ref 135–145)
TROPONIN T, HIGH SENSITIVITY RESULT: 9 NG/L — SIGNIFICANT CHANGE UP (ref 0–51)
TROPONIN T, HIGH SENSITIVITY RESULT: 9 NG/L — SIGNIFICANT CHANGE UP (ref 0–51)
WBC # BLD: 76.26 K/UL — CRITICAL HIGH (ref 3.8–10.5)
WBC # FLD AUTO: 76.26 K/UL — CRITICAL HIGH (ref 3.8–10.5)

## 2021-03-23 PROCEDURE — 99221 1ST HOSP IP/OBS SF/LOW 40: CPT

## 2021-03-23 PROCEDURE — 93010 ELECTROCARDIOGRAM REPORT: CPT

## 2021-03-23 PROCEDURE — 99285 EMERGENCY DEPT VISIT HI MDM: CPT | Mod: CS,GC

## 2021-03-23 PROCEDURE — 71045 X-RAY EXAM CHEST 1 VIEW: CPT | Mod: 26

## 2021-03-23 PROCEDURE — 99222 1ST HOSP IP/OBS MODERATE 55: CPT

## 2021-03-23 RX ORDER — SODIUM CHLORIDE 9 MG/ML
3 INJECTION INTRAMUSCULAR; INTRAVENOUS; SUBCUTANEOUS EVERY 8 HOURS
Refills: 0 | Status: DISCONTINUED | OUTPATIENT
Start: 2021-03-23 | End: 2021-03-26

## 2021-03-23 RX ORDER — AZITHROMYCIN 500 MG/1
500 TABLET, FILM COATED ORAL ONCE
Refills: 0 | Status: COMPLETED | OUTPATIENT
Start: 2021-03-23 | End: 2021-03-23

## 2021-03-23 RX ORDER — TAMSULOSIN HYDROCHLORIDE 0.4 MG/1
0.4 CAPSULE ORAL AT BEDTIME
Refills: 0 | Status: DISCONTINUED | OUTPATIENT
Start: 2021-03-23 | End: 2021-03-26

## 2021-03-23 RX ORDER — SODIUM CHLORIDE 9 MG/ML
1000 INJECTION INTRAMUSCULAR; INTRAVENOUS; SUBCUTANEOUS ONCE
Refills: 0 | Status: COMPLETED | OUTPATIENT
Start: 2021-03-23 | End: 2021-03-23

## 2021-03-23 RX ORDER — FINASTERIDE 5 MG/1
5 TABLET, FILM COATED ORAL DAILY
Refills: 0 | Status: DISCONTINUED | OUTPATIENT
Start: 2021-03-23 | End: 2021-03-26

## 2021-03-23 RX ORDER — CEFTRIAXONE 500 MG/1
1000 INJECTION, POWDER, FOR SOLUTION INTRAMUSCULAR; INTRAVENOUS EVERY 24 HOURS
Refills: 0 | Status: DISCONTINUED | OUTPATIENT
Start: 2021-03-23 | End: 2021-03-26

## 2021-03-23 RX ADMIN — CEFTRIAXONE 100 MILLIGRAM(S): 500 INJECTION, POWDER, FOR SOLUTION INTRAMUSCULAR; INTRAVENOUS at 18:02

## 2021-03-23 RX ADMIN — TAMSULOSIN HYDROCHLORIDE 0.4 MILLIGRAM(S): 0.4 CAPSULE ORAL at 22:14

## 2021-03-23 RX ADMIN — AZITHROMYCIN 250 MILLIGRAM(S): 500 TABLET, FILM COATED ORAL at 18:02

## 2021-03-23 RX ADMIN — SODIUM CHLORIDE 1000 MILLILITER(S): 9 INJECTION INTRAMUSCULAR; INTRAVENOUS; SUBCUTANEOUS at 18:01

## 2021-03-23 RX ADMIN — SODIUM CHLORIDE 3 MILLILITER(S): 9 INJECTION INTRAMUSCULAR; INTRAVENOUS; SUBCUTANEOUS at 18:01

## 2021-03-23 NOTE — CONSULT NOTE ADULT - SUBJECTIVE AND OBJECTIVE BOX
Patient is a 70y old  Male who presents with a chief complaint of Cough , SOB (23 Mar 2021 16:57)    HPI:  This is a 72 y/o male with h/o CLL 7 years ago and bph  who presented to urgent care with c/o cough , and sob.  His  cxr revealed a LLL infiltrate and he was advised to go to the ER via ambulance.  He presented to the ER, no c/o sob but does present with dyspnea with conversation.  He has a productive cough, afebrile but c/o night sweats for 4 days.  ID was consulted , cultures and  covid swab were sent  ., He received ceftriaxone 1 gram and  is admitted for further w/u. (23 Mar 2021 16:57)      PAST MEDICAL & SURGICAL HISTORY:  History of BPH    CLL (chronic lymphocytic leukemia)    H/O excision of mass  R hip        Social history:    FAMILY HISTORY:    REVIEW OF SYSTEMS  General:	Denies any malaise fatigue or chills. Fevers absent    Skin:No rash  	  Ophthalmologic:Denies any visual complaints,discharge redness or photophobia  	  ENMT:No nasal discharge,headache,sinus congestion or throat pain.No dental complaints    Respiratory and Thorax:No cough,sputum or chest pain.Denies shortness of breath  	  Cardiovascular:	No chest pain,palpitaions or dizziness    Gastrointestinal:	NO nausea,abdominal pain or diarrhea.    Genitourinary:	No dysuria,frequency. No flank pain    Musculoskeletal:	No joint swelling or pain.No weakness    Neurological:No confusion,diziness.No extremity weakness.No bladder or bowel incontinence	    Psychiatric:No delusions or hallucinations	    Hematology/Lymphatics:	No LN swelling.No gum bleeding     Endocrine:	No recent weight gain or loss.No abnormal heat/cold intolerance    Allergic/Immunologic:	No hives or rash   Allergies    Bactrim (Unknown)    Intolerances        Antimicrobials:    cefTRIAXone   IVPB 1000 milliGRAM(s) IV Intermittent every 24 hours        Vital Signs Last 24 Hrs  T(C): 37 (23 Mar 2021 16:45), Max: 37.4 (23 Mar 2021 15:34)  T(F): 98.6 (23 Mar 2021 16:45), Max: 99.3 (23 Mar 2021 15:34)  HR: 88 (23 Mar 2021 16:45) (87 - 88)  BP: 122/76 (23 Mar 2021 16:45) (120/74 - 125/73)  BP(mean): --  RR: 18 (23 Mar 2021 16:45) (18 - 18)  SpO2: 99% (23 Mar 2021 16:45) (97% - 99%)    PHYSICAL EXAM:Pleasant patient in no acute distress.      Constitutional:Comfortable.Awake and alert  No cachexia     Eyes:PERRL EOMI.NO discharge or conjunctival injection    ENMT:No sinus tenderness.No thrush.No pharyngeal exudate or erythema.Fair dental hygiene    Neck:Supple,No LN,no JVD      Respiratory:Good air entry bilaterally,CTA    Cardiovascular:S1 S2 wnl, No murmurs,rub or gallops    Gastrointestinal:Soft BS(+) no tenderness no masses ,No rebound or guarding    Genitourinary:No CVA tendereness     Rectal:    Extremities:No cyanosis,clubbing or edema.    Vascular:peripheral pulses felt    Neurological:AAO X 3,No grossly focal deficits    Skin:No rash     Lymph Nodes:No palpable LNs    Musculoskeletal:No joint swelling or LOM    Psychiatric:Affect normal.                                12.5   76.26 )-----------( 141      ( 23 Mar 2021 16:16 )             40.5         03-23    140  |  101  |  26<H>  ----------------------------<  112<H>  4.2   |  26  |  1.01    Ca    9.7      23 Mar 2021 16:16    TPro  6.9  /  Alb  4.3  /  TBili  0.4  /  DBili  x   /  AST  25  /  ALT  24  /  AlkPhos  105  03-23      RECENT CULTURES:      MICROBIOLOGY:          Radiology:      Assessment:        Recommendations and Plan:    Pager [phone]  After 5 pm/weekends or if no response :[phone]

## 2021-03-23 NOTE — H&P ADULT - NSHPSOCIALHISTORY_GEN_ALL_CORE
Social History:    Marital Status:   Occupation: retired  Lives with:  children    Substance Use :  Tobacco Usage:  (   ) never smoked   (  x ) former smoker   (   ) current smoker  (     ) pack year  (        ) last tobacco use date  Alcohol Usage:no

## 2021-03-23 NOTE — ED PROVIDER NOTE - PHYSICAL EXAMINATION
I have reviewed the triage vital signs.  Const: AAOx3, in NAD  Eyes: no conjunctival injection  HENT: NCAT, Neck supple, oral mucosa moist  CV: RRR, +S1, S2  Resp: CTAB, mild respiratory distress, coughing  GI: Abdomen soft, NTND, no guarding, no CVA tenderness  Extremities: No peripheral edema,  2+ radial and DP pulses  Skin: Warm, well perfused, no rash  MSK: No gross deformities appreciated  Neuro: No focal sensory or motor deficits  Psych: Appropriate mood and affect

## 2021-03-23 NOTE — ED PROVIDER NOTE - OBJECTIVE STATEMENT
PMH CLL  cough and not feeling well. CXR performed at  today showing L lingular PNA. WBC increased to 80s (baseline 40-50s) Oncologist ALEKSANDR Liu. Admitted to [address] [place] [address] Dr. Moris Calloway (cardiac surgeon) PMH CLL (not on chemo, last session 7 years ago) sent for direct admission to Dr. Calloway for L lingular PNA. Pt reports cough and malaise for past few days. 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 Of note, received 2 doses of COVID-19 vaccine 1 mo ago. CXR performed at  today showing L lingular PNA, and was told that he needs to be admitted to hospital. Pt's son works at [employer], called cardiac surgeon Dr. Moris Calloway and obtained a bed on [address] [place] [address] for PNA treatment and monitoring. Pt's oncologist at [facility] DR. Silvino Liu. As per son, pt's WBC count increased to 80s, baseline in 40-50s. Pt denies fevers/chills. Did have some L>R CP 3 wk ago which he states has resolved. Also had some SOB with exertion which he states has resolved. Denies SOB at rest currently. No abd pain. +Nausea, but no vomiting. +Decreased appetite. No diarrhea, urinary sx.

## 2021-03-23 NOTE — ED PROVIDER NOTE - MDM ORDERS SUBMITTED SELECTION
Problem: Patient Care Overview  Goal: Plan of Care Review  Outcome: Ongoing (interventions implemented as appropriate)  Flowsheets (Taken 12/9/2019 1342)  Progress: improving  Plan of Care Reviewed With: patient  Outcome Summary: Pt tolerated treatment well this date. Increased gait distance to 100ft w/ Rw and CGA-SBA. Slight right lean throughout, though no overt LOBs noted. Encouraged pt to ambulate w/ nsg throughout the day. PT will continue to address functional mobility deficits as tolerated.      Labs/EKG/Imaging Studies

## 2021-03-23 NOTE — ED ADULT NURSE REASSESSMENT NOTE - NS ED NURSE REASSESS COMMENT FT1
pt refused rectal temperature even after educated as to the importance  he stated "I am not a little by  do not touch me"

## 2021-03-23 NOTE — ED ADULT NURSE REASSESSMENT NOTE - NS ED NURSE REASSESS COMMENT FT1
Pt resting comfortably and waiting for covid swab to result so patient can go to his bed on [address].

## 2021-03-23 NOTE — ED PROVIDER NOTE - NS ED ROS FT
General: +Malaise, decreased appetite. Denies fevers  Eyes: Denies vision changes  ENMT: Denies sore throat  CV: +chest pain  Resp: +Cough, JOSHI previously. Denies SOB at rest.   GI: +Nausea. Denies abdominal pain, vomiting, diarrhea  : Denies painful urination  Skin: Denies new rashes  Neuro: Denies headache, focal weakness  MSK: Denies recent trauma

## 2021-03-23 NOTE — CONSULT NOTE ADULT - ASSESSMENT
71 year old with CLL, not on recent treatment , c completed two covid vaccines. Cll present for about 7 years. Complaints of chills, cough, CP sweats, no fever for several days.   Chest xray with LLL infiltrate.   Awiting Covid ( doubt).  Most likely pneumococcal pna     legionella urine ag  ceftriaxone   bc  discussed with [facility]

## 2021-03-23 NOTE — H&P ADULT - ASSESSMENT
72 y/o male w/h/o CLL presents with cough, sob , 4 day h/o nightsweats, and LLL infiltrate on cxr admitted for further w/u.  ID consulted, placed on ceftriaxone daily, cultures and covid pending.  Pulmonary consulted.

## 2021-03-23 NOTE — ED PROVIDER NOTE - ATTENDING CONTRIBUTION TO CARE
MD Leigh:  patient seen and evaluated with the resident.  I was present for key portions of the History & Physical, and I agree with the Impression & Plan.  MD Leigh:  69 yo M, c/o cough, SOB, subj fevers.    Context:  went to  where CXR showed L-sided PNA, and was told to go to the ED for admit/IV abx.   MHx of CLL.  Duration of Sx: 2d.  Better: none.  Worse:  exertion.    VS: wnl.  Physical Exam: elderly M, SOB-appearing, NCAT, PERRL, EOMI, neck supple, RRR,   Crackles middle lung fields, Abd: s/nd/nt, Ext: no edema, Neuro: AAOx3, gait not assessed  ECG = nonspecific TWI in V4-6.  Impression:  CAP in adult M with CLL, and mild SOB at rest.    Plan:  IV abx, IVF, blood Cx, ECG, admit.

## 2021-03-23 NOTE — ED PROVIDER NOTE - CLINICAL SUMMARY MEDICAL DECISION MAKING FREE TEXT BOX
Rigo, PGY2 - 70M PMH CLL not on chemo sent in for concerns of PNA, pt with cough, malaise, mild SOB.

## 2021-03-23 NOTE — H&P ADULT - NSHPPHYSICALEXAM_GEN_ALL_CORE
General: No distress, dyspnea with talking    Respiratory: clear to auscultation bilaterally, no wheeze, no rhonchi, no crackles noted  Cardiovascular:  S1 and S2 audible, rrr  GI: bowel sounds present x4, abdomen soft, non tender, non distended  Peripheral Vascular: no edema, 2+ peripheral pulses, no clubbing, cyanosis ,  Musculoskeletal: 5/5 strength bilateral upper and lower extremities  Psychiatric: Normal mood, normal affect observed  Neuro: alert and oriented x 4,, speech clear, no focal deficits noted

## 2021-03-23 NOTE — ED ADULT NURSE NOTE - OBJECTIVE STATEMENT
pt was sick 2 weeks ago and went to [facility] today and sent to er for pneumonia.  he is sob but has no fever lungs are clear and aerating well

## 2021-03-23 NOTE — H&P ADULT - HISTORY OF PRESENT ILLNESS
This is a 72 y/o male with h/o CLL 7 years ago and bph  who presented to urgent care with c/o cough , and sob.  His  cxr revealed a LLL infiltrate and he was advised to go to the ER via ambulance.  He presented to the ER, no c/o sob but does present with dyspnea with conversation.  He has a productive cough, afebrile but c/o night sweats for 4 days.  ID was consulted , cultures and a covid swab were sent   and he recieved ceftriaxone 1 gram.  He is admitted for further w/u. This is a 72 y/o male with h/o CLL 7 years ago and bph  who presented to urgent care with c/o cough , and sob.  His  cxr revealed a LLL infiltrate and he was advised to go to the ER via ambulance.  He presented to the ER, no c/o sob but does present with dyspnea with conversation.  He has a productive cough, afebrile but c/o night sweats for 4 days.  ID was consulted , cultures and  covid swab were sent  ., He received ceftriaxone 1 gram and  is admitted for further w/u.

## 2021-03-24 LAB
ALBUMIN SERPL ELPH-MCNC: 3.9 G/DL — SIGNIFICANT CHANGE UP (ref 3.3–5)
ALP SERPL-CCNC: 90 U/L — SIGNIFICANT CHANGE UP (ref 40–120)
ALT FLD-CCNC: 23 U/L — SIGNIFICANT CHANGE UP (ref 10–45)
ANION GAP SERPL CALC-SCNC: 12 MMOL/L — SIGNIFICANT CHANGE UP (ref 5–17)
AST SERPL-CCNC: 24 U/L — SIGNIFICANT CHANGE UP (ref 10–40)
BILIRUB SERPL-MCNC: 0.4 MG/DL — SIGNIFICANT CHANGE UP (ref 0.2–1.2)
BUN SERPL-MCNC: 23 MG/DL — SIGNIFICANT CHANGE UP (ref 7–23)
CALCIUM SERPL-MCNC: 9.3 MG/DL — SIGNIFICANT CHANGE UP (ref 8.4–10.5)
CHLORIDE SERPL-SCNC: 104 MMOL/L — SIGNIFICANT CHANGE UP (ref 96–108)
CO2 SERPL-SCNC: 24 MMOL/L — SIGNIFICANT CHANGE UP (ref 22–31)
COVID-19 SPIKE DOMAIN AB INTERP: NEGATIVE — SIGNIFICANT CHANGE UP
COVID-19 SPIKE DOMAIN ANTIBODY RESULT: 0.4 U/ML — SIGNIFICANT CHANGE UP
CREAT SERPL-MCNC: 1.03 MG/DL — SIGNIFICANT CHANGE UP (ref 0.5–1.3)
GLUCOSE SERPL-MCNC: 104 MG/DL — HIGH (ref 70–99)
HCT VFR BLD CALC: 37.1 % — LOW (ref 39–50)
HCV AB S/CO SERPL IA: 0.03 S/CO — SIGNIFICANT CHANGE UP (ref 0–0.99)
HCV AB SERPL-IMP: SIGNIFICANT CHANGE UP
HGB BLD-MCNC: 11.6 G/DL — LOW (ref 13–17)
MCHC RBC-ENTMCNC: 30.3 PG — SIGNIFICANT CHANGE UP (ref 27–34)
MCHC RBC-ENTMCNC: 31.3 GM/DL — LOW (ref 32–36)
MCV RBC AUTO: 96.9 FL — SIGNIFICANT CHANGE UP (ref 80–100)
NRBC # BLD: 0 /100 WBCS — SIGNIFICANT CHANGE UP (ref 0–0)
PLATELET # BLD AUTO: 128 K/UL — LOW (ref 150–400)
POTASSIUM SERPL-MCNC: 4.8 MMOL/L — SIGNIFICANT CHANGE UP (ref 3.5–5.3)
POTASSIUM SERPL-SCNC: 4.8 MMOL/L — SIGNIFICANT CHANGE UP (ref 3.5–5.3)
PROT SERPL-MCNC: 6.2 G/DL — SIGNIFICANT CHANGE UP (ref 6–8.3)
RBC # BLD: 3.83 M/UL — LOW (ref 4.2–5.8)
RBC # FLD: 13.9 % — SIGNIFICANT CHANGE UP (ref 10.3–14.5)
SARS-COV-2 IGG+IGM SERPL QL IA: 0.4 U/ML — SIGNIFICANT CHANGE UP
SARS-COV-2 IGG+IGM SERPL QL IA: NEGATIVE — SIGNIFICANT CHANGE UP
SODIUM SERPL-SCNC: 140 MMOL/L — SIGNIFICANT CHANGE UP (ref 135–145)
WBC # BLD: 68.81 K/UL — CRITICAL HIGH (ref 3.8–10.5)
WBC # FLD AUTO: 68.81 K/UL — CRITICAL HIGH (ref 3.8–10.5)

## 2021-03-24 PROCEDURE — 99232 SBSQ HOSP IP/OBS MODERATE 35: CPT

## 2021-03-24 PROCEDURE — 93306 TTE W/DOPPLER COMPLETE: CPT | Mod: 26

## 2021-03-24 PROCEDURE — 71045 X-RAY EXAM CHEST 1 VIEW: CPT | Mod: 26

## 2021-03-24 PROCEDURE — 99223 1ST HOSP IP/OBS HIGH 75: CPT

## 2021-03-24 RX ORDER — IPRATROPIUM/ALBUTEROL SULFATE 18-103MCG
3 AEROSOL WITH ADAPTER (GRAM) INHALATION EVERY 6 HOURS
Refills: 0 | Status: DISCONTINUED | OUTPATIENT
Start: 2021-03-24 | End: 2021-03-26

## 2021-03-24 RX ORDER — INFLUENZA VIRUS VACCINE 15; 15; 15; 15 UG/.5ML; UG/.5ML; UG/.5ML; UG/.5ML
0.5 SUSPENSION INTRAMUSCULAR ONCE
Refills: 0 | Status: DISCONTINUED | OUTPATIENT
Start: 2021-03-24 | End: 2021-03-26

## 2021-03-24 RX ORDER — BUDESONIDE AND FORMOTEROL FUMARATE DIHYDRATE 160; 4.5 UG/1; UG/1
2 AEROSOL RESPIRATORY (INHALATION)
Refills: 0 | Status: DISCONTINUED | OUTPATIENT
Start: 2021-03-24 | End: 2021-03-26

## 2021-03-24 RX ADMIN — Medication 3 MILLILITER(S): at 06:50

## 2021-03-24 RX ADMIN — SODIUM CHLORIDE 3 MILLILITER(S): 9 INJECTION INTRAMUSCULAR; INTRAVENOUS; SUBCUTANEOUS at 13:04

## 2021-03-24 RX ADMIN — BUDESONIDE AND FORMOTEROL FUMARATE DIHYDRATE 2 PUFF(S): 160; 4.5 AEROSOL RESPIRATORY (INHALATION) at 17:00

## 2021-03-24 RX ADMIN — CEFTRIAXONE 100 MILLIGRAM(S): 500 INJECTION, POWDER, FOR SOLUTION INTRAMUSCULAR; INTRAVENOUS at 17:02

## 2021-03-24 RX ADMIN — SODIUM CHLORIDE 3 MILLILITER(S): 9 INJECTION INTRAMUSCULAR; INTRAVENOUS; SUBCUTANEOUS at 04:59

## 2021-03-24 RX ADMIN — TAMSULOSIN HYDROCHLORIDE 0.4 MILLIGRAM(S): 0.4 CAPSULE ORAL at 21:22

## 2021-03-24 RX ADMIN — Medication 3 MILLILITER(S): at 11:11

## 2021-03-24 RX ADMIN — FINASTERIDE 5 MILLIGRAM(S): 5 TABLET, FILM COATED ORAL at 11:10

## 2021-03-24 RX ADMIN — Medication 3 MILLILITER(S): at 17:07

## 2021-03-24 RX ADMIN — Medication 10 MILLIGRAM(S): at 11:11

## 2021-03-24 RX ADMIN — SODIUM CHLORIDE 3 MILLILITER(S): 9 INJECTION INTRAMUSCULAR; INTRAVENOUS; SUBCUTANEOUS at 21:20

## 2021-03-24 NOTE — PROGRESS NOTE ADULT - PROBLEM SELECTOR PLAN 1
Ceftriaxone 1 g daily  covid, cultures  ID/Pulm consults ID and pulm following for LLL PNA  covid neg; bc testing; wbc 67- secondary to CLL; last chemo 2013  continue IV Rocephin as per ID; no azithromycin at this time   bronchodilators initiated for exp. wheeze  ck immunoglobulin panel as per ID  ck echo today

## 2021-03-24 NOTE — PROGRESS NOTE ADULT - SUBJECTIVE AND OBJECTIVE BOX
VITAL SIGNS    Telemetry:    Vital Signs Last 24 Hrs  T(C): 37.4 (21 @ 05:30), Max: 37.8 (21 @ 21:57)  T(F): 99.3 (21 @ 05:30), Max: 100 (21 @ 21:57)  HR: 81 (21 @ 05:30) (81 - 88)  BP: 104/64 (21 @ 05:30) (104/64 - 131/79)  RR: 18 (21 @ 05:30) (18 - 18)  SpO2: 95% (21 @ 05:30) (95% - 99%)             @ 07:01  -   @ 07:00  --------------------------------------------------------  IN: 300 mL / OUT: 600 mL / NET: -300 mL       Daily Height in cm: 165.1 (23 Mar 2021 15:34)    Daily Weight in k.1 (24 Mar 2021 07:18)  Admit Wt: Drug Dosing Weight  Height (cm): 165.1 (23 Mar 2021 15:34)  Weight (kg): 77.6 (23 Mar 2021 15:34)  BMI (kg/m2): 28.5 (23 Mar 2021 15:34)  BSA (m2): 1.85 (23 Mar 2021 15:34)    Bilirubin Total, Serum: 0.4 mg/dL ( @ 06:08)  Bilirubin Total, Serum: 0.4 mg/dL ( @ 16:16)    CAPILLARY BLOOD GLUCOSE              albuterol/ipratropium for Nebulization 3 milliLiter(s) Nebulizer every 6 hours  budesonide 160 MICROgram(s)/formoterol 4.5 MICROgram(s) Inhaler 2 Puff(s) Inhalation two times a day  cefTRIAXone   IVPB 1000 milliGRAM(s) IV Intermittent every 24 hours  finasteride 5 milliGRAM(s) Oral daily  influenza   Vaccine 0.5 milliLiter(s) IntraMuscular once  PARoxetine 10 milliGRAM(s) Oral daily  sodium chloride 0.9% lock flush 3 milliLiter(s) IV Push every 8 hours  tamsulosin 0.4 milliGRAM(s) Oral at bedtime      PHYSICAL EXAM    Subjective: "Hi.   Neurology: alert and oriented x 3, nonfocal, no gross deficits  CV : tele:  RSR  Sternal Wound :  CDI with dressing , Stable  Lungs: clear. RR easy, unlabored   Abdomen: soft, nontender, nondistended, positive bowel sounds, bowel movement   Neg N/V/D   :  pt voiding without difficulty   Extremities:   LANDON; edema, neg calf tenderness.   PPP bilaterally      PW:  Chest tubes:                 VITAL SIGNS    Telemetry:  rsr 70-90   Vital Signs Last 24 Hrs  T(C): 37.4 (21 @ 05:30), Max: 37.8 (21 @ 21:57)  T(F): 99.3 (21 @ 05:30), Max: 100 (21 @ 21:57)  HR: 81 (21 @ 05:30) (81 - 88)  BP: 104/64 (21 @ 05:30) (104/64 - 131/79)  RR: 18 (21 @ 05:30) (18 - 18)  SpO2: 95% (21 @ 05:30) (95% - 99%)             @ 07:01  -   @ 07:00  --------------------------------------------------------  IN: 300 mL / OUT: 600 mL / NET: -300 mL       Daily Height in cm: 165.1 (23 Mar 2021 15:34)    Daily Weight in k.1 (24 Mar 2021 07:18)  Admit Wt: Drug Dosing Weight  Height (cm): 165.1 (23 Mar 2021 15:34)  Weight (kg): 77.6 (23 Mar 2021 15:34)  BMI (kg/m2): 28.5 (23 Mar 2021 15:34)  BSA (m2): 1.85 (23 Mar 2021 15:34)    Bilirubin Total, Serum: 0.4 mg/dL ( @ 06:08)  Bilirubin Total, Serum: 0.4 mg/dL ( @ 16:16)      albuterol/ipratropium for Nebulization 3 milliLiter(s) Nebulizer every 6 hours  budesonide 160 MICROgram(s)/formoterol 4.5 MICROgram(s) Inhaler 2 Puff(s) Inhalation two times a day  cefTRIAXone   IVPB 1000 milliGRAM(s) IV Intermittent every 24 hours  finasteride 5 milliGRAM(s) Oral daily  influenza   Vaccine 0.5 milliLiter(s) IntraMuscular once  PARoxetine 10 milliGRAM(s) Oral daily  sodium chloride 0.9% lock flush 3 milliLiter(s) IV Push every 8 hours  tamsulosin 0.4 milliGRAM(s) Oral at bedtime      PHYSICAL EXAM    Subjective: "I feel ok."   Neurology: alert and oriented x 3, nonfocal, no gross deficits  CV : tele:  RSR 70-90  Lungs: minimal exp. wheezing noted;   Abdomen: soft, nontender, nondistended, positive bowel sounds, + bowel movement   Neg N/V/D   :  pt voiding without difficulty   Extremities:   LANDON; neg LE edema, neg calf tenderness.   PPP bilaterally

## 2021-03-24 NOTE — CONSULT NOTE ADULT - SUBJECTIVE AND OBJECTIVE BOX
HPI: This is a 70 y/o male with h/o CLL 7 years ago and bph  who presented to urgent care with c/o cough , and sob.  His  cxr revealed a LLL infiltrate and he was advised to go to the ER via ambulance.  He presented to the ER, no c/o sob but does present with dyspnea with conversation.  He has a productive cough, afebrile but c/o night sweats for 4 days.  ID was consulted , cultures and  covid swab were sent  ., He received ceftriaxone 1 gram and  is admitted for further w/u. (23 Mar 2021 16:57)        PAST MEDICAL & SURGICAL HISTORY:  History of BPH  CLL (chronic lymphocytic leukemia)  H/O excision of mass  R hip        FAMILY HISTORY:      SOCIAL HISTORY:  Smoking: __ packs x ___ years  EtOH Use:  Marital Status:  Occupation:  Exposures:  Recent Travel:    Allergies    Bactrim (Unknown)    Intolerances        HOME MEDICATIONS:    REVIEW OF SYSTEMS:  Constitutional: No fevers or chills. No weight loss. No fatigue or generalized malaise.  Eyes: No itching or discharge from the eyes  ENT: No ear pain. No ear discharge. No nasal congestion. No post nasal drip. No epistaxis. No throat pain. No sore throat. No difficulty swallowing.   CV: No chest pain. No palpitations. No lightheadedness or dizziness.   Resp: No dyspnea at rest. No dyspnea on exertion. No orthopnea. No wheezing. No cough. No stridor. No sputum production. No chest pain with respiration.  GI: No nausea. No vomiting. No diarrhea.  MSK: No joint pain or pain in any extremities  Integumentary: No skin lesions. No pedal edema.  Neurological: No gross motor weakness. No sensory changes.    [ ] All other systems negative  [ ] Unable to assess ROS because ________    OBJECTIVE:  ICU Vital Signs Last 24 Hrs  T(C): 37.4 (24 Mar 2021 05:30), Max: 37.8 (23 Mar 2021 21:57)  T(F): 99.3 (24 Mar 2021 05:30), Max: 100 (23 Mar 2021 21:57)  HR: 81 (24 Mar 2021 05:30) (81 - 88)  BP: 104/64 (24 Mar 2021 05:30) (104/64 - 131/79)  BP(mean): --  ABP: --  ABP(mean): --  RR: 18 (24 Mar 2021 05:30) (18 - 18)  SpO2: 95% (24 Mar 2021 05:30) (95% - 99%)        03-23 @ 07:01  -  03-24 @ 06:00  --------------------------------------------------------  IN: 250 mL / OUT: 400 mL / NET: -150 mL      CAPILLARY BLOOD GLUCOSE          PHYSICAL EXAM:  General: Awake, alert, oriented X 3.   HEENT: Atraumatic, normocephalic.                 Mallampatti Grade                 No nasal congestion.                No tonsillar or pharyngeal exudates.  Lymph Nodes: No palpable lymphadenopathy  Neck: No JVD. No carotid bruit.   Respiratory: Normal chest expansion                         Normal percussion                         Normal and equal air entry                         No wheeze, rhonchi or rales.  Cardiovascular: S1 S2 normal. No murmurs, rubs or gallops.   Abdomen: Soft, non-tender, non-distended. No organomegaly. Normoactive bowel sounds.  Extremities: Warm to touch. Peripheral pulse palpable. No pedal edema.   Skin: No rashes or skin lesions  Neurological: Motor and sensory examination equal and normal in all four extremities.  Psychiatry: Appropriate mood and affect.    HOSPITAL MEDICATIONS:  MEDICATIONS  (STANDING):  cefTRIAXone   IVPB 1000 milliGRAM(s) IV Intermittent every 24 hours  finasteride 5 milliGRAM(s) Oral daily  influenza   Vaccine 0.5 milliLiter(s) IntraMuscular once  PARoxetine 10 milliGRAM(s) Oral daily  sodium chloride 0.9% lock flush 3 milliLiter(s) IV Push every 8 hours  tamsulosin 0.4 milliGRAM(s) Oral at bedtime    MEDICATIONS  (PRN):      LABS:                        12.5   76.26 )-----------( 141      ( 23 Mar 2021 16:16 )             40.5     03-23    140  |  101  |  26<H>  ----------------------------<  112<H>  4.2   |  26  |  1.01    Ca    9.7      23 Mar 2021 16:16    TPro  6.9  /  Alb  4.3  /  TBili  0.4  /  DBili  x   /  AST  25  /  ALT  24  /  AlkPhos  105  03-23              MICROBIOLOGY:     RADIOLOGY: < from: Xray Chest 1 View- PORTABLE-Urgent (Xray Chest 1 View- PORTABLE-Urgent .) (03.23.21 @ 17:14) >  PROCEDURE DATE:  03/23/2021            INTERPRETATION:  CLINICAL INDICATION: Cough.    EXAM: Frontal view of the chest with no prior radiographs.    FINDINGS:  Left basilar opacity, portions of which appear linear. There is no pleural effusion or pneumothorax.  The heart size is normal.  No acute bony pathology.    IMPRESSION:  Left basilar opacity, portions of which appear linear, which may represent atelectasis and/or pneumonia..      < end of copied text >    [ ] Reviewed and interpreted by me    Point of Care Ultrasound Findings;    PFT:    EKG:   HPI: This is a 72 y/o male with h/o CLL 7 years ago and bph  who presented to urgent care with c/o cough , and sob.  His  cxr revealed a LLL infiltrate and he was advised to go to the ER via ambulance.  He presented to the ER, no c/o sob but does present with dyspnea with conversation.  He has a productive cough, afebrile but c/o night sweats for 4 days.  ID was consulted , cultures and  covid swab were sent  ., He received ceftriaxone 1 gram and  is admitted for further w/u. (23 Mar 2021 16:57)  Never resp illness like this before-now wheezing/sob/cough-no fever or chills but + sweats        PAST MEDICAL & SURGICAL HISTORY:  History of BPH  CLL (chronic lymphocytic leukemia)  H/O excision of mass  R hip        FAMILY HISTORY: M-CA  D-34 yo ?      SOCIAL HISTORY:  Smoking: neg  EtOH Use: rare  Marital Status: w-3 kids  Occupation: retired-[employer]  Exposures: neg  Recent Travel: [place] born    Allergies    Bactrim (Unknown)    Intolerances        HOME MEDICATIONS: Home Medications:  Flomax 0.4 mg oral capsule: 1 cap(s) orally once a day (23 Mar 2021 17:46)  Paxil 10 mg oral tablet: 1 tab(s) orally once a day (23 Mar 2021 17:46)  Proscar 5 mg oral tablet: 1 tab(s) orally once a day (23 Mar 2021 17:46)      REVIEW OF SYSTEMS:  Constitutional: No fevers or chills. No weight loss. + fatigue or generalized malaise.  Eyes: No itching or discharge from the eyes  ENT: No ear pain. No ear discharge. no nasal congestion. No post nasal drip. No epistaxis. No throat pain. No sore throat. No difficulty swallowing.   CV: No chest pain. No palpitations. No lightheadedness or dizziness.   Resp: No dyspnea at rest. + dyspnea on exertion. No orthopnea. + wheezing. + cough. No stridor. No sputum production. No chest pain with respiration.  GI: No nausea. No vomiting. No diarrhea.  MSK: No joint pain or pain in any extremities  Integumentary: No skin lesions. No pedal edema.  Neurological: No gross motor weakness. No sensory changes.    [+ ] All other systems negative  + snore  [ ] Unable to assess ROS because ________    OBJECTIVE:  ICU Vital Signs Last 24 Hrs  T(C): 37.4 (24 Mar 2021 05:30), Max: 37.8 (23 Mar 2021 21:57)  T(F): 99.3 (24 Mar 2021 05:30), Max: 100 (23 Mar 2021 21:57)  HR: 81 (24 Mar 2021 05:30) (81 - 88)  BP: 104/64 (24 Mar 2021 05:30) (104/64 - 131/79)  BP(mean): --  ABP: --  ABP(mean): --  RR: 18 (24 Mar 2021 05:30) (18 - 18)  SpO2: 95% (24 Mar 2021 05:30) (95% - 99%)        03-23 @ 07:01  -  03-24 @ 06:00  --------------------------------------------------------  IN: 250 mL / OUT: 400 mL / NET: -150 mL      CAPILLARY BLOOD GLUCOSE          PHYSICAL EXAM: NAD in bed on RA  General: Awake, alert, oriented X 3.   HEENT: Atraumatic, normocephalic.                 Mallampatti Grade 3                No nasal congestion.                No tonsillar or pharyngeal exudates.  Lymph Nodes: No palpable lymphadenopathy  Neck: No JVD. No carotid bruit.   Respiratory: Normal chest expansion                         Normal percussion                         Normal and equal air entry                        ++ wheeze and rhonchi but no rales.  Cardiovascular: S1 S2 normal. No murmurs, rubs or gallops.   Abdomen: Soft, non-tender, non-distended. No organomegaly. Normoactive bowel sounds.  Extremities: Warm to touch. Peripheral pulse palpable. No pedal edema.   Skin: No rashes or skin lesions  Neurological: Motor and sensory examination equal and normal in all four extremities.  Psychiatry: Appropriate mood and affect.    HOSPITAL MEDICATIONS:  MEDICATIONS  (STANDING):  cefTRIAXone   IVPB 1000 milliGRAM(s) IV Intermittent every 24 hours  finasteride 5 milliGRAM(s) Oral daily  influenza   Vaccine 0.5 milliLiter(s) IntraMuscular once  PARoxetine 10 milliGRAM(s) Oral daily  sodium chloride 0.9% lock flush 3 milliLiter(s) IV Push every 8 hours  tamsulosin 0.4 milliGRAM(s) Oral at bedtime    MEDICATIONS  (PRN):      LABS:                        12.5   76.26 )-----------( 141      ( 23 Mar 2021 16:16 )             40.5     03-23    140  |  101  |  26<H>  ----------------------------<  112<H>  4.2   |  26  |  1.01    Ca    9.7      23 Mar 2021 16:16    TPro  6.9  /  Alb  4.3  /  TBili  0.4  /  DBili  x   /  AST  25  /  ALT  24  /  AlkPhos  105  03-23              MICROBIOLOGY:     RADIOLOGY: < from: Xray Chest 1 View- PORTABLE-Urgent (Xray Chest 1 View- PORTABLE-Urgent .) (03.23.21 @ 17:14) >  PROCEDURE DATE:  03/23/2021            INTERPRETATION:  CLINICAL INDICATION: Cough.    EXAM: Frontal view of the chest with no prior radiographs.    FINDINGS:  Left basilar opacity, portions of which appear linear. There is no pleural effusion or pneumothorax.  The heart size is normal.  No acute bony pathology.    IMPRESSION:  Left basilar opacity, portions of which appear linear, which may represent atelectasis and/or pneumonia..      < end of copied text >    [ ] Reviewed and interpreted by me    Point of Care Ultrasound Findings;    PFT:    EKG:

## 2021-03-24 NOTE — CONSULT NOTE ADULT - ASSESSMENT
71 year old with CLL, not on recent treatment , c completed two covid vaccines. Cll present for about 7 years. Complaints of chills, cough, CP sweats, no fever for several days.   Chest xray with LLL infiltrate.   Awiting Covid ( doubt).  Most likely pneumococcal pna     legionella urine ag  ceftriaxone   bc   71 year old with CLL, not on recent treatment , c completed two covid vaccines. Cll present for about 7 years. Complaints of chills, cough, CP sweats, no fever for several days.   Chest xray with LLL infiltrate. DDX M. pna/C. 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  *************

## 2021-03-24 NOTE — CONSULT NOTE ADULT - TIME BILLING
as above-  dyspnea due to PNA in face of CLL--DDX M/C/legionella pna or typicals such as strep PNA w/ associated bronchospasm;        associated bronchospastic disease, over all overweight and out of shape.  PNA- C/M titers, legionella ag, blood cx,   f/up CXR PA/Lat next 24-48 hours, IV ceftriaxone/zithromax  Bronchospasm--prednisone 40mg per day, symbicort 160 2 bid, albuterol via HHN q 6, acapella, incentive spirometry  ? OSAS-out pt SS  GERD-pepcid 20 qhs        DVT prophylaxis         CLL-heme f/up here  Lux Severino MD-Pulmonary   [phone]

## 2021-03-24 NOTE — PROGRESS NOTE ADULT - ASSESSMENT
70 y/o male w/h/o CLL presents with cough, sob , 4 day h/o nightsweats, and LLL infiltrate on cxr admitted for further w/u.  ID consulted, placed on ceftriaxone daily, cultures and covid pending.  Pulmonary consulted. 70 y/o male w/h/o CLL presents with cough, sob , 4 day h/o nightsweats, and LLL infiltrate on cxr admitted for further w/u.  ID consulted, placed on ceftriaxone daily, cultures and covid pending.  Pulmonary consulted.  3/24 [facility]; low grade temps ; ID and pulm following for LLL PNA; covid neg; bc testing; wbc 67- secondary to CLL; last chemo 2013;   continue IV Rocephin as per ID; no azithromycin at this time; bronchodilators initiated for exp. wheeze; ck immunoglobulin panel as per ID  ck Echo - pt c/o of remote hx of dizziness   Discharge planning- home when stable

## 2021-03-24 NOTE — PROGRESS NOTE ADULT - SUBJECTIVE AND OBJECTIVE BOX
infectious diseases progress note:    Patient is a 70y old  Male who presents with a chief complaint of Cough , SOB (24 Mar 2021 06:00)        Pneumonia due to organism          ROS:  CONSTITUTIONAL:  Negative fever or chills, feels well, good appetite  EYES:  Negative  blurry vision or double vision  CARDIOVASCULAR:  Negative for chest pain or palpitations  RESPIRATORY:  Negative for cough, wheezing, or SOB   GASTROINTESTINAL:  Negative for nausea, vomiting, diarrhea, constipation, or abdominal pain  GENITOURINARY:  Negative frequency, urgency or dysuria  NEUROLOGIC:  No headache, confusion, dizziness, lightheadedness    Allergies    Bactrim (Unknown)    Intolerances        ANTIBIOTICS/RELEVANT:  antimicrobials  cefTRIAXone   IVPB 1000 milliGRAM(s) IV Intermittent every 24 hours    immunologic:  influenza   Vaccine 0.5 milliLiter(s) IntraMuscular once    OTHER:  albuterol/ipratropium for Nebulization 3 milliLiter(s) Nebulizer every 6 hours  budesonide 160 MICROgram(s)/formoterol 4.5 MICROgram(s) Inhaler 2 Puff(s) Inhalation two times a day  finasteride 5 milliGRAM(s) Oral daily  PARoxetine 10 milliGRAM(s) Oral daily  sodium chloride 0.9% lock flush 3 milliLiter(s) IV Push every 8 hours  tamsulosin 0.4 milliGRAM(s) Oral at bedtime      Objective:  Vital Signs Last 24 Hrs  T(C): 37.4 (24 Mar 2021 05:30), Max: 37.8 (23 Mar 2021 21:57)  T(F): 99.3 (24 Mar 2021 05:30), Max: 100 (23 Mar 2021 21:57)  HR: 81 (24 Mar 2021 05:30) (81 - 88)  BP: 104/64 (24 Mar 2021 05:30) (104/64 - 131/79)  BP(mean): --  RR: 18 (24 Mar 2021 05:30) (18 - 18)  SpO2: 95% (24 Mar 2021 05:30) (95% - 99%)   s  Eyes:KOLTON, EOMI  Ear/Nose/Throat: no oral lesion, no sinus tenderness on percussion	  Neck:no JVD, no lymphadenopathy, supple  Respiratory: CTA kristopher  Cardiovascular: S1S2 RRR, no murmurs  Gastrointestinal:soft, (+) BS, no HSM  Extremities:no e/e/c        LABS:                        11.6   68.81 )-----------( 128      ( 24 Mar 2021 06:08 )             37.1     03-24    140  |  104  |  23  ----------------------------<  104<H>  4.8   |  24  |  1.03    Ca    9.3      24 Mar 2021 06:08    TPro  6.2  /  Alb  3.9  /  TBili  0.4  /  DBili  x   /  AST  24  /  ALT  23  /  AlkPhos  90  03-24            MICROBIOLOGY:    RECENT CULTURES:        RESPIRATORY CULTURES:              RADIOLOGY & ADDITIONAL STUDIES:        Pager [phone]  After 5 pm/weekends or if no response :[phone]

## 2021-03-24 NOTE — PROGRESS NOTE ADULT - ASSESSMENT
71 year old with CLL, not on recent treatment , c completed two covid vaccines. Cll present for about 7 years. Complaints of chills, cough, CP sweats, no fever for several days.   Chest xray with LLL infiltrate.   Awiting Covid ( doubt).  Most likely pneumococcal pna     legionella urine ag pending   seems better this am  comfortable on RA  not SOB  ceftriaxone   bc pending  would get immunoglobulins as he may be a candidate for replacement     would hold off on systemic steroids if possible   discussed with [facility]

## 2021-03-25 LAB
ANION GAP SERPL CALC-SCNC: 12 MMOL/L — SIGNIFICANT CHANGE UP (ref 5–17)
BUN SERPL-MCNC: 23 MG/DL — SIGNIFICANT CHANGE UP (ref 7–23)
CALCIUM SERPL-MCNC: 9.3 MG/DL — SIGNIFICANT CHANGE UP (ref 8.4–10.5)
CHLORIDE SERPL-SCNC: 105 MMOL/L — SIGNIFICANT CHANGE UP (ref 96–108)
CO2 SERPL-SCNC: 24 MMOL/L — SIGNIFICANT CHANGE UP (ref 22–31)
CREAT SERPL-MCNC: 0.93 MG/DL — SIGNIFICANT CHANGE UP (ref 0.5–1.3)
GLUCOSE SERPL-MCNC: 114 MG/DL — HIGH (ref 70–99)
HCT VFR BLD CALC: 39.1 % — SIGNIFICANT CHANGE UP (ref 39–50)
HGB BLD-MCNC: 12.1 G/DL — LOW (ref 13–17)
IGA FLD-MCNC: 69 MG/DL — LOW (ref 84–499)
IGG FLD-MCNC: 388 MG/DL — LOW (ref 610–1660)
IGM SERPL-MCNC: <10 MG/DL — LOW (ref 35–242)
KAPPA LC SER QL IFE: 0.83 MG/DL — SIGNIFICANT CHANGE UP (ref 0.33–1.94)
KAPPA/LAMBDA FREE LIGHT CHAIN RATIO, SERUM: 0.05 RATIO — LOW (ref 0.26–1.65)
LAMBDA LC SER QL IFE: 15.5 MG/DL — HIGH (ref 0.57–2.63)
LEGIONELLA AG UR QL: NEGATIVE — SIGNIFICANT CHANGE UP
MCHC RBC-ENTMCNC: 30.5 PG — SIGNIFICANT CHANGE UP (ref 27–34)
MCHC RBC-ENTMCNC: 30.9 GM/DL — LOW (ref 32–36)
MCV RBC AUTO: 98.5 FL — SIGNIFICANT CHANGE UP (ref 80–100)
NRBC # BLD: 0 /100 WBCS — SIGNIFICANT CHANGE UP (ref 0–0)
PLATELET # BLD AUTO: 132 K/UL — LOW (ref 150–400)
POTASSIUM SERPL-MCNC: 4.4 MMOL/L — SIGNIFICANT CHANGE UP (ref 3.5–5.3)
POTASSIUM SERPL-SCNC: 4.4 MMOL/L — SIGNIFICANT CHANGE UP (ref 3.5–5.3)
RBC # BLD: 3.97 M/UL — LOW (ref 4.2–5.8)
RBC # FLD: 13.9 % — SIGNIFICANT CHANGE UP (ref 10.3–14.5)
SODIUM SERPL-SCNC: 141 MMOL/L — SIGNIFICANT CHANGE UP (ref 135–145)
WBC # BLD: 67.43 K/UL — CRITICAL HIGH (ref 3.8–10.5)
WBC # FLD AUTO: 67.43 K/UL — CRITICAL HIGH (ref 3.8–10.5)

## 2021-03-25 PROCEDURE — 99232 SBSQ HOSP IP/OBS MODERATE 35: CPT

## 2021-03-25 RX ADMIN — SODIUM CHLORIDE 3 MILLILITER(S): 9 INJECTION INTRAMUSCULAR; INTRAVENOUS; SUBCUTANEOUS at 22:45

## 2021-03-25 RX ADMIN — Medication 10 MILLIGRAM(S): at 11:38

## 2021-03-25 RX ADMIN — CEFTRIAXONE 100 MILLIGRAM(S): 500 INJECTION, POWDER, FOR SOLUTION INTRAMUSCULAR; INTRAVENOUS at 17:15

## 2021-03-25 RX ADMIN — SODIUM CHLORIDE 3 MILLILITER(S): 9 INJECTION INTRAMUSCULAR; INTRAVENOUS; SUBCUTANEOUS at 05:47

## 2021-03-25 RX ADMIN — BUDESONIDE AND FORMOTEROL FUMARATE DIHYDRATE 2 PUFF(S): 160; 4.5 AEROSOL RESPIRATORY (INHALATION) at 08:00

## 2021-03-25 RX ADMIN — Medication 3 MILLILITER(S): at 11:38

## 2021-03-25 RX ADMIN — FINASTERIDE 5 MILLIGRAM(S): 5 TABLET, FILM COATED ORAL at 11:38

## 2021-03-25 RX ADMIN — Medication 3 MILLILITER(S): at 23:46

## 2021-03-25 RX ADMIN — BUDESONIDE AND FORMOTEROL FUMARATE DIHYDRATE 2 PUFF(S): 160; 4.5 AEROSOL RESPIRATORY (INHALATION) at 17:15

## 2021-03-25 RX ADMIN — TAMSULOSIN HYDROCHLORIDE 0.4 MILLIGRAM(S): 0.4 CAPSULE ORAL at 21:46

## 2021-03-25 RX ADMIN — Medication 3 MILLILITER(S): at 05:37

## 2021-03-25 RX ADMIN — Medication 3 MILLILITER(S): at 17:15

## 2021-03-25 RX ADMIN — SODIUM CHLORIDE 3 MILLILITER(S): 9 INJECTION INTRAMUSCULAR; INTRAVENOUS; SUBCUTANEOUS at 12:00

## 2021-03-25 NOTE — PROGRESS NOTE ADULT - SUBJECTIVE AND OBJECTIVE BOX
SUBJECTIVE ASSESSMENT:  Patient states that he feels "better" today.  He feels that his cough is improving, but he still gets "phlegm" up when he coughs.  He ambulated 2x yesterday with the nurse and plans to walk today.  He is tolerating PO diet w/ good appetite, and is having normal BMs.  He denies SOB, CP, fever/chills, hemoptysis, N/V/D, abdominal pain or other new symptoms.  He is eager to go home.          Vital Signs Last 24 Hrs  T(C): 37.1 (25 Mar 2021 05:07), Max: 37.2 (24 Mar 2021 12:59)  T(F): 98.8 (25 Mar 2021 05:07), Max: 99 (24 Mar 2021 12:59)  HR: 75 (25 Mar 2021 05:07) (75 - 83)  BP: 135/74 (25 Mar 2021 05:07) (109/66 - 135/74)  BP(mean): 94 (25 Mar 2021 05:07) (80 - 94)  RR: 18 (25 Mar 2021 05:07) (18 - 18)  SpO2: 97% (25 Mar 2021 05:07) (94% - 97%)  I&O's Detail    24 Mar 2021 07:01  -  25 Mar 2021 07:00  --------------------------------------------------------  IN:    IV PiggyBack: 50 mL    Oral Fluid: 560 mL  Total IN: 610 mL    OUT:    Voided (mL): 900 mL  Total OUT: 900 mL    Total NET: -290 mL      25 Mar 2021 07:01  -  25 Mar 2021 09:09  --------------------------------------------------------  IN:    Oral Fluid: 240 mL  Total IN: 240 mL    OUT:  Total OUT: 0 mL    Total NET: 240 mL        PHYSICAL EXAM:    GEN: NAD, looks comfortable, pleasant/cooperative.  On room air.   Psych: Mood appropriate  Neuro: A&Ox3.  No focal deficits.  Moving all extremities.   HEENT: No obvious abnormalities  CV: S1S2, regular, no murmurs appreciated.  No carotid bruits.  No JVD  Lungs: Clear B/L.  No wheezing, rales or rhonchi  ABD: Soft, non-tender, non-distended.  +Bowel sounds  EXT: Warm and well perfused.  No peripheral edema noted  Musculoskeletal: Moving all extremities with normal ROM, no joint swelling  PV: Pedal pulses palpable      LABS:                        12.1   67.43 )-----------( 132      ( 25 Mar 2021 05:33 )             39.1           03-25    141  |  105  |  23  ----------------------------<  114<H>  4.4   |  24  |  0.93    Ca    9.3      25 Mar 2021 05:33    TPro  6.2  /  Alb  3.9  /  TBili  0.4  /  DBili  x   /  AST  24  /  ALT  23  /  AlkPhos  90  03-24          MEDICATIONS  (STANDING):  albuterol/ipratropium for Nebulization 3 milliLiter(s) Nebulizer every 6 hours  budesonide 160 MICROgram(s)/formoterol 4.5 MICROgram(s) Inhaler 2 Puff(s) Inhalation two times a day  cefTRIAXone   IVPB 1000 milliGRAM(s) IV Intermittent every 24 hours  finasteride 5 milliGRAM(s) Oral daily  influenza   Vaccine 0.5 milliLiter(s) IntraMuscular once  PARoxetine 10 milliGRAM(s) Oral daily  sodium chloride 0.9% lock flush 3 milliLiter(s) IV Push every 8 hours  tamsulosin 0.4 milliGRAM(s) Oral at bedtime    MEDICATIONS  (PRN):        RADIOLOGY & ADDITIONAL TESTS:    < from: Xray Chest 1 View- PORTABLE-Routine (Xray Chest 1 View- PORTABLE-Routine in AM.) (03.24.21 @ 08:43) >    The heart is normal in size. Left lower lobe pneumonia and/or atelectasis. The right lung appears to be clear. No pleural effusion. No pneumothorax. Degenerative changes of the thoracic spine.    < end of copied text >   SUBJECTIVE ASSESSMENT:  Patient states that he feels "better" today.  He feels that his cough is improving, but he still gets "phlegm" up when he coughs.  He ambulated 2x yesterday with the nurse and plans to walk today.  He is tolerating PO diet w/ good appetite, and is having normal BMs.  He denies SOB, CP, fever/chills, hemoptysis, N/V/D, abdominal pain or other new symptoms.  He is eager to go home.          Vital Signs Last 24 Hrs  T(C): 37.1 (25 Mar 2021 05:07), Max: 37.2 (24 Mar 2021 12:59)  T(F): 98.8 (25 Mar 2021 05:07), Max: 99 (24 Mar 2021 12:59)  HR: 75 (25 Mar 2021 05:07) (75 - 83)  BP: 135/74 (25 Mar 2021 05:07) (109/66 - 135/74)  BP(mean): 94 (25 Mar 2021 05:07) (80 - 94)  RR: 18 (25 Mar 2021 05:07) (18 - 18)  SpO2: 97% (25 Mar 2021 05:07) (94% - 97%)  I&O's Detail    24 Mar 2021 07:01  -  25 Mar 2021 07:00  --------------------------------------------------------  IN:    IV PiggyBack: 50 mL    Oral Fluid: 560 mL  Total IN: 610 mL    OUT:    Voided (mL): 900 mL  Total OUT: 900 mL    Total NET: -290 mL      25 Mar 2021 07:01  -  25 Mar 2021 09:09  --------------------------------------------------------  IN:    Oral Fluid: 240 mL  Total IN: 240 mL    OUT:  Total OUT: 0 mL    Total NET: 240 mL        PHYSICAL EXAM:    GEN: NAD, looks comfortable, pleasant/cooperative.  On room air.    Psych: Mood appropriate  Neuro: A&Ox3.  No focal deficits.  Moving all extremities.   HEENT: No obvious abnormalities  CV: S1S2, regular, no murmurs appreciated.  No carotid bruits.  No JVD  Lungs: Clear B/L.  No wheezing, rales or rhonchi.  Coughed a little during interaction, sounded wet.  He knows now to try to expectorate his phlegm  ABD: Soft, non-tender, non-distended.  +Bowel sounds  EXT: Warm and well perfused.  No peripheral edema noted  Musculoskeletal: Moving all extremities with normal ROM, no joint swelling  PV: Pedal pulses palpable      LABS:                        12.1   67.43 )-----------( 132      ( 25 Mar 2021 05:33 )             39.1           03-25    141  |  105  |  23  ----------------------------<  114<H>  4.4   |  24  |  0.93    Ca    9.3      25 Mar 2021 05:33    TPro  6.2  /  Alb  3.9  /  TBili  0.4  /  DBili  x   /  AST  24  /  ALT  23  /  AlkPhos  90  03-24          MEDICATIONS  (STANDING):  albuterol/ipratropium for Nebulization 3 milliLiter(s) Nebulizer every 6 hours  budesonide 160 MICROgram(s)/formoterol 4.5 MICROgram(s) Inhaler 2 Puff(s) Inhalation two times a day  cefTRIAXone   IVPB 1000 milliGRAM(s) IV Intermittent every 24 hours  finasteride 5 milliGRAM(s) Oral daily  influenza   Vaccine 0.5 milliLiter(s) IntraMuscular once  PARoxetine 10 milliGRAM(s) Oral daily  sodium chloride 0.9% lock flush 3 milliLiter(s) IV Push every 8 hours  tamsulosin 0.4 milliGRAM(s) Oral at bedtime    MEDICATIONS  (PRN):        RADIOLOGY & ADDITIONAL TESTS:    < from: Xray Chest 1 View- PORTABLE-Routine (Xray Chest 1 View- PORTABLE-Routine in AM.) (03.24.21 @ 08:43) >    The heart is normal in size. Left lower lobe pneumonia and/or atelectasis. The right lung appears to be clear. No pleural effusion. No pneumothorax. Degenerative changes of the thoracic spine.    < end of copied text >

## 2021-03-25 NOTE — PROGRESS NOTE ADULT - ASSESSMENT
71 year old with CLL, not on recent treatment , c completed two covid vaccines. Cll present for about 7 years. Complaints of chills, cough, CP sweats, no fever for several days.   Chest xray with LLL infiltrate. DDX M. pna/C. 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  *************  3/25-slightly better over all-minimal ambulation

## 2021-03-25 NOTE — PROGRESS NOTE ADULT - SUBJECTIVE AND OBJECTIVE BOX
infectious diseases progress note:    Patient is a 70y old  Male who presents with a chief complaint of Cough , SOB (25 Mar 2021 05:05)        Pneumonia due to organism               Allergies    Bactrim (Unknown)    Intolerances        ANTIBIOTICS/RELEVANT:  antimicrobials  cefTRIAXone   IVPB 1000 milliGRAM(s) IV Intermittent every 24 hours    immunologic:  influenza   Vaccine 0.5 milliLiter(s) IntraMuscular once    OTHER:  albuterol/ipratropium for Nebulization 3 milliLiter(s) Nebulizer every 6 hours  budesonide 160 MICROgram(s)/formoterol 4.5 MICROgram(s) Inhaler 2 Puff(s) Inhalation two times a day  finasteride 5 milliGRAM(s) Oral daily  PARoxetine 10 milliGRAM(s) Oral daily  sodium chloride 0.9% lock flush 3 milliLiter(s) IV Push every 8 hours  tamsulosin 0.4 milliGRAM(s) Oral at bedtime      Objective:  Vital Signs Last 24 Hrs  T(C): 37.1 (25 Mar 2021 05:07), Max: 37.2 (24 Mar 2021 12:59)  T(F): 98.8 (25 Mar 2021 05:07), Max: 99 (24 Mar 2021 12:59)  HR: 75 (25 Mar 2021 05:07) (75 - 83)  BP: 135/74 (25 Mar 2021 05:07) (109/66 - 135/74)  BP(mean): 94 (25 Mar 2021 05:07) (80 - 94)  RR: 18 (25 Mar 2021 05:07) (18 - 18)  SpO2: 97% (25 Mar 2021 05:07) (94% - 97%)       Eyes:KOLTON, EOMI  Ear/Nose/Throat: no oral lesion, no sinus tenderness on percussion	  Neck:no JVD, no lymphadenopathy, supple  Respiratory: CTA kristopher  Cardiovascular: S1S2 RRR, no murmurs  Gastrointestinal:soft, (+) BS, no HSM  Extremities:no e/e/c        LABS:                        12.1   67.43 )-----------( 132      ( 25 Mar 2021 05:33 )             39.1     03-25    141  |  105  |  23  ----------------------------<  114<H>  4.4   |  24  |  0.93    Ca    9.3      25 Mar 2021 05:33    TPro  6.2  /  Alb  3.9  /  TBili  0.4  /  DBili  x   /  AST  24  /  ALT  23  /  AlkPhos  90  03-24            MICROBIOLOGY:    RECENT CULTURES:  03-23 @ 21:24 .Blood Blood-Peripheral                No growth to date.          RESPIRATORY CULTURES:              RADIOLOGY & ADDITIONAL STUDIES:        Pager [phone]  After 5 pm/weekends or if no response :[phone]

## 2021-03-25 NOTE — PROGRESS NOTE ADULT - ASSESSMENT
This is a 70 y/o male with PMHx of CLL who presented initially with cough/SOB and night sweats x 4 days, in addition had LLL infiltrate on CXR and was admitted for further work-up and treatment.  Dr. Hurd (ID) consulted, placed on Ceftriaxone daily, COVID turned up negative from 3/24/21, blood cultures NGTD.  Pulmonary consulted and following for LLL PNA.      3/24 VSS; low grade temps ; ID and pulm following for LLL PNA; covid neg; bc testing; wbc 68- secondary to CLL; last chemo 2013;   continue IV Rocephin as per ID; no azithromycin at this time; bronchodilators initiated for exp. wheeze; ck immunoglobulin panel as per ID  ck Echo - pt c/o of remote hx of dizziness   3/25 Echo yesterday negative.  Per ID note, hope to change to PO abx in next 48 hours.  F/U immunoglobulin panel (possible mold exposure at home prior to admission).    Discharge planning- home when stable         Problem 1: Acute pneumonia.    Plan: ID and pulm following for LLL PNA  covid neg; blood culture NGTD- F/U final report  WBC 67 today- secondary to CLL; last chemo 2013  Continue IV Rocephin as per ID  Bronchodilators initiated for wheezing, which is improved.   F/U immunoglobulin panel as per ID  Echo 3/24/21 negative for acute findings    Problem 2: CLL (chronic lymphocytic leukemia).    Plan:   WBC elevated but stable from baseline  Has outside hematologist.     Dispo: Home when cleared medically.     Attestation Statements:   Attestation Statements:  ACP only visit.     Supervising Attending: Dr. Calloway.

## 2021-03-25 NOTE — PROGRESS NOTE ADULT - SUBJECTIVE AND OBJECTIVE BOX
CHIEF COMPLAINT: f/up sob, pna, rads, ? osas--better -less cough and wheeze    Interval Events: none    REVIEW OF SYSTEMS:  Constitutional: No fevers or chills. No weight loss. + fatigue or generalized malaise.  Eyes: No itching or discharge from the eyes  ENT: No ear pain. No ear discharge. No nasal congestion. No post nasal drip. No epistaxis. No throat pain. No sore throat. No difficulty swallowing.   CV: No chest pain. No palpitations. No lightheadedness or dizziness.   Resp: No dyspnea at rest. + dyspnea on exertion. No orthopnea. + wheezing. + cough. No stridor. No sputum production. No chest pain with respiration.  GI: No nausea. No vomiting. No diarrhea.  MSK: No joint pain or pain in any extremities  Integumentary: No skin lesions. No pedal edema.  Neurological: No gross motor weakness. No sensory changes.  [+ ] All other systems negative  [ ] Unable to assess ROS because ________    OBJECTIVE:  ICU Vital Signs Last 24 Hrs  T(C): 36.8 (24 Mar 2021 20:22), Max: 37.4 (24 Mar 2021 05:30)  T(F): 98.3 (24 Mar 2021 20:22), Max: 99.3 (24 Mar 2021 05:30)  HR: 83 (24 Mar 2021 20:22) (81 - 83)  BP: 109/66 (24 Mar 2021 20:22) (104/64 - 114/75)  BP(mean): 80 (24 Mar 2021 20:22) (80 - 80)  ABP: --  ABP(mean): --  RR: 18 (24 Mar 2021 20:22) (18 - 18)  SpO2: 94% (24 Mar 2021 20:22) (94% - 95%)        03-23 @ 07:01  -  03-24 @ 07:00  --------------------------------------------------------  IN: 300 mL / OUT: 600 mL / NET: -300 mL    03-24 @ 07:01  -  03-25 @ 05:05  --------------------------------------------------------  IN: 610 mL / OUT: 900 mL / NET: -290 mL      CAPILLARY BLOOD GLUCOSE          PHYSICAL EXAM:  General: Awake, alert, oriented X 3.   HEENT: Atraumatic, normocephalic.                 Mallampatti Grade 3                No nasal congestion.                No tonsillar or pharyngeal exudates.  Lymph Nodes: No palpable lymphadenopathy  Neck: No JVD. No carotid bruit.   Respiratory: Normal chest expansion                         Normal percussion                         Normal and equal air entry                         + wheeze, rhonchi but + rales.  Cardiovascular: S1 S2 normal. No murmurs, rubs or gallops.   Abdomen: Soft, non-tender, non-distended. No organomegaly. Normoactive bowel sounds.  Extremities: Warm to touch. Peripheral pulse palpable. No pedal edema.   Skin: No rashes or skin lesions  Neurological: Motor and sensory examination equal and normal in all four extremities.  Psychiatry: Appropriate mood and affect.    HOSPITAL MEDICATIONS:  MEDICATIONS  (STANDING):  albuterol/ipratropium for Nebulization 3 milliLiter(s) Nebulizer every 6 hours  budesonide 160 MICROgram(s)/formoterol 4.5 MICROgram(s) Inhaler 2 Puff(s) Inhalation two times a day  cefTRIAXone   IVPB 1000 milliGRAM(s) IV Intermittent every 24 hours  finasteride 5 milliGRAM(s) Oral daily  influenza   Vaccine 0.5 milliLiter(s) IntraMuscular once  PARoxetine 10 milliGRAM(s) Oral daily  sodium chloride 0.9% lock flush 3 milliLiter(s) IV Push every 8 hours  tamsulosin 0.4 milliGRAM(s) Oral at bedtime    MEDICATIONS  (PRN):      LABS:                        11.6   68.81 )-----------( 128      ( 24 Mar 2021 06:08 )             37.1     03-24    140  |  104  |  23  ----------------------------<  104<H>  4.8   |  24  |  1.03    Ca    9.3      24 Mar 2021 06:08    TPro  6.2  /  Alb  3.9  /  TBili  0.4  /  DBili  x   /  AST  24  /  ALT  23  /  AlkPhos  90  03-24              MICROBIOLOGY:     RADIOLOGY:  [ ] Reviewed and interpreted by me    Point of Care Ultrasound Findings:    PFT:    EKG:

## 2021-03-25 NOTE — PROGRESS NOTE ADULT - ASSESSMENT
71 year old with CLL, not on recent treatment , c completed two covid vaccines. Cll present for about 7 years. Complaints of chills, cough, CP sweats, no fever for several days.   Chest xray with LLL infiltrate.   Awiting Covid ( doubt).  Most likely pneumococcal pna     legionella urine ag pending   seems better this am  comfortable on RA  not SOB  ceftriaxone      doing better  comfortable on RA  Hope to switch to po ab in the next 48 hr.

## 2021-03-26 ENCOUNTER — TRANSCRIPTION ENCOUNTER (OUTPATIENT)
Age: 71
End: 2021-03-26

## 2021-03-26 VITALS
DIASTOLIC BLOOD PRESSURE: 77 MMHG | HEART RATE: 81 BPM | OXYGEN SATURATION: 94 % | RESPIRATION RATE: 18 BRPM | TEMPERATURE: 99 F | SYSTOLIC BLOOD PRESSURE: 114 MMHG

## 2021-03-26 LAB
ANION GAP SERPL CALC-SCNC: 14 MMOL/L — SIGNIFICANT CHANGE UP (ref 5–17)
BUN SERPL-MCNC: 26 MG/DL — HIGH (ref 7–23)
CALCIUM SERPL-MCNC: 9.7 MG/DL — SIGNIFICANT CHANGE UP (ref 8.4–10.5)
CHLORIDE SERPL-SCNC: 103 MMOL/L — SIGNIFICANT CHANGE UP (ref 96–108)
CO2 SERPL-SCNC: 25 MMOL/L — SIGNIFICANT CHANGE UP (ref 22–31)
CREAT SERPL-MCNC: 0.98 MG/DL — SIGNIFICANT CHANGE UP (ref 0.5–1.3)
GLUCOSE SERPL-MCNC: 117 MG/DL — HIGH (ref 70–99)
HCT VFR BLD CALC: 38.9 % — LOW (ref 39–50)
HGB BLD-MCNC: 11.8 G/DL — LOW (ref 13–17)
LEGIONELLA AB SER-ACNC: <0.91 — SIGNIFICANT CHANGE UP (ref 0–0.9)
M PNEUMO IGG SER IA-ACNC: 1.56 INDEX — HIGH (ref 0–0.9)
M PNEUMO IGG SER IA-ACNC: POSITIVE
MAGNESIUM SERPL-MCNC: 1.9 MG/DL — SIGNIFICANT CHANGE UP (ref 1.6–2.6)
MCHC RBC-ENTMCNC: 30.2 PG — SIGNIFICANT CHANGE UP (ref 27–34)
MCHC RBC-ENTMCNC: 30.3 GM/DL — LOW (ref 32–36)
MCV RBC AUTO: 99.5 FL — SIGNIFICANT CHANGE UP (ref 80–100)
NRBC # BLD: 0 /100 WBCS — SIGNIFICANT CHANGE UP (ref 0–0)
PLATELET # BLD AUTO: 138 K/UL — LOW (ref 150–400)
POTASSIUM SERPL-MCNC: 4.9 MMOL/L — SIGNIFICANT CHANGE UP (ref 3.5–5.3)
POTASSIUM SERPL-MCNC: 5.5 MMOL/L — HIGH (ref 3.5–5.3)
POTASSIUM SERPL-SCNC: 4.9 MMOL/L — SIGNIFICANT CHANGE UP (ref 3.5–5.3)
POTASSIUM SERPL-SCNC: 5.5 MMOL/L — HIGH (ref 3.5–5.3)
RBC # BLD: 3.91 M/UL — LOW (ref 4.2–5.8)
RBC # FLD: 14 % — SIGNIFICANT CHANGE UP (ref 10.3–14.5)
SODIUM SERPL-SCNC: 142 MMOL/L — SIGNIFICANT CHANGE UP (ref 135–145)
WBC # BLD: 66.73 K/UL — CRITICAL HIGH (ref 3.8–10.5)
WBC # FLD AUTO: 66.73 K/UL — CRITICAL HIGH (ref 3.8–10.5)

## 2021-03-26 PROCEDURE — 99239 HOSP IP/OBS DSCHRG MGMT >30: CPT

## 2021-03-26 PROCEDURE — 99285 EMERGENCY DEPT VISIT HI MDM: CPT

## 2021-03-26 PROCEDURE — 86631 CHLAMYDIA ANTIBODY: CPT

## 2021-03-26 PROCEDURE — 87040 BLOOD CULTURE FOR BACTERIA: CPT

## 2021-03-26 PROCEDURE — 94640 AIRWAY INHALATION TREATMENT: CPT

## 2021-03-26 PROCEDURE — 83735 ASSAY OF MAGNESIUM: CPT

## 2021-03-26 PROCEDURE — 85025 COMPLETE CBC W/AUTO DIFF WBC: CPT

## 2021-03-26 PROCEDURE — 82784 ASSAY IGA/IGD/IGG/IGM EACH: CPT

## 2021-03-26 PROCEDURE — 71046 X-RAY EXAM CHEST 2 VIEWS: CPT | Mod: 26

## 2021-03-26 PROCEDURE — 99232 SBSQ HOSP IP/OBS MODERATE 35: CPT

## 2021-03-26 PROCEDURE — 86769 SARS-COV-2 COVID-19 ANTIBODY: CPT

## 2021-03-26 PROCEDURE — 84132 ASSAY OF SERUM POTASSIUM: CPT

## 2021-03-26 PROCEDURE — 71045 X-RAY EXAM CHEST 1 VIEW: CPT

## 2021-03-26 PROCEDURE — 85027 COMPLETE CBC AUTOMATED: CPT

## 2021-03-26 PROCEDURE — 80048 BASIC METABOLIC PNL TOTAL CA: CPT

## 2021-03-26 PROCEDURE — 87449 NOS EACH ORGANISM AG IA: CPT

## 2021-03-26 PROCEDURE — 86803 HEPATITIS C AB TEST: CPT

## 2021-03-26 PROCEDURE — 86632 CHLAMYDIA IGM ANTIBODY: CPT

## 2021-03-26 PROCEDURE — 93306 TTE W/DOPPLER COMPLETE: CPT

## 2021-03-26 PROCEDURE — 80053 COMPREHEN METABOLIC PANEL: CPT

## 2021-03-26 PROCEDURE — 86713 LEGIONELLA ANTIBODY: CPT

## 2021-03-26 PROCEDURE — 71046 X-RAY EXAM CHEST 2 VIEWS: CPT

## 2021-03-26 PROCEDURE — 84484 ASSAY OF TROPONIN QUANT: CPT

## 2021-03-26 PROCEDURE — U0005: CPT

## 2021-03-26 PROCEDURE — 99497 ADVNCD CARE PLAN 30 MIN: CPT | Mod: 25

## 2021-03-26 PROCEDURE — 86738 MYCOPLASMA ANTIBODY: CPT

## 2021-03-26 PROCEDURE — 83880 ASSAY OF NATRIURETIC PEPTIDE: CPT

## 2021-03-26 PROCEDURE — 99223 1ST HOSP IP/OBS HIGH 75: CPT

## 2021-03-26 PROCEDURE — U0003: CPT

## 2021-03-26 RX ORDER — TAMSULOSIN HYDROCHLORIDE 0.4 MG/1
1 CAPSULE ORAL
Qty: 0 | Refills: 0 | DISCHARGE
Start: 2021-03-26

## 2021-03-26 RX ORDER — IPRATROPIUM/ALBUTEROL SULFATE 18-103MCG
1 AEROSOL WITH ADAPTER (GRAM) INHALATION
Qty: 1 | Refills: 0
Start: 2021-03-26 | End: 2021-04-24

## 2021-03-26 RX ORDER — FINASTERIDE 5 MG/1
1 TABLET, FILM COATED ORAL
Qty: 0 | Refills: 0 | DISCHARGE

## 2021-03-26 RX ORDER — FINASTERIDE 5 MG/1
1 TABLET, FILM COATED ORAL
Qty: 0 | Refills: 0 | DISCHARGE
Start: 2021-03-26

## 2021-03-26 RX ORDER — BUDESONIDE AND FORMOTEROL FUMARATE DIHYDRATE 160; 4.5 UG/1; UG/1
2 AEROSOL RESPIRATORY (INHALATION)
Qty: 1 | Refills: 0
Start: 2021-03-26 | End: 2021-04-24

## 2021-03-26 RX ORDER — CEFUROXIME AXETIL 250 MG
1 TABLET ORAL
Qty: 20 | Refills: 0
Start: 2021-03-26 | End: 2021-04-04

## 2021-03-26 RX ORDER — TAMSULOSIN HYDROCHLORIDE 0.4 MG/1
1 CAPSULE ORAL
Qty: 0 | Refills: 0 | DISCHARGE

## 2021-03-26 RX ADMIN — Medication 10 MILLIGRAM(S): at 11:35

## 2021-03-26 RX ADMIN — SODIUM CHLORIDE 3 MILLILITER(S): 9 INJECTION INTRAMUSCULAR; INTRAVENOUS; SUBCUTANEOUS at 13:00

## 2021-03-26 RX ADMIN — SODIUM CHLORIDE 3 MILLILITER(S): 9 INJECTION INTRAMUSCULAR; INTRAVENOUS; SUBCUTANEOUS at 06:16

## 2021-03-26 RX ADMIN — FINASTERIDE 5 MILLIGRAM(S): 5 TABLET, FILM COATED ORAL at 11:35

## 2021-03-26 RX ADMIN — Medication 3 MILLILITER(S): at 06:11

## 2021-03-26 RX ADMIN — BUDESONIDE AND FORMOTEROL FUMARATE DIHYDRATE 2 PUFF(S): 160; 4.5 AEROSOL RESPIRATORY (INHALATION) at 06:11

## 2021-03-26 RX ADMIN — Medication 3 MILLILITER(S): at 11:35

## 2021-03-26 NOTE — CONSULT NOTE ADULT - PROBLEM SELECTOR RECOMMENDATION 5
Will try to reach out to his son, Nino, in order to give him inputs regarding my consult (mainly trying to complete a HCP, trying to f/u with the [facility] outpatient office [[address] [place] [address], [address]. [place] [address]. Phone [phone]] and considering PT for balance training)     Since the patient is actively being DC, I will sign off.         Moose Jaeger MD   Geriatrics and Palliative Care ([facility]) Consult Service    of Geriatric and Palliative Medicine  [facility]      Please page the following number for clinical matters between the hours of 9 am and 5 pm from Monday through Friday : [phone]    After 5pm and on weekends, please see the contact information below:    In the event of newly developing, evolving, or worsening symptoms, please contact the Palliative Medicine team via pager (if the patient is at [facility] #[phone] or if the patient is at [facility] #[phone]) The Geriatric and Palliative Medicine service has coverage 24 hours a day/ 7 days a week to provide medical recommendations regarding symptom management needs via telephone

## 2021-03-26 NOTE — PROVIDER CONTACT NOTE (CRITICAL VALUE NOTIFICATION) - ASSESSMENT
Pt asymptomatic, Cefuroxime IVPB q24h (will go home later today on PO antibiotics), pt has CLL so known high WBC

## 2021-03-26 NOTE — DISCHARGE NOTE PROVIDER - NSDCPNSUBOBJ_GEN_ALL_CORE
AxoX3  NSR76  B.l breath sounds on room air   Ab soft nontender + BS  Voids   equal strength throughout  b/llewarm wellperfused + DP no edema  < from: Xray Chest 2 Views PA/Lat (03.26.21 @ 09:45) >    FINDINGS:  The cardiac silhouette is normal in size. There is a patchy airspace opacity in the left lower lobe. The hilar and mediastinal structures appear unremarkable. The osseous structures are intact.    IMPRESSION: Small left lower lobe pneumonia.                          11.8   66.73 )-----------( 138      ( 26 Mar 2021 06:35 )             38.9   03-26    x   |  x   |  x   ----------------------------<  x   4.9   |  x   |  x     Ca    9.7      26 Mar 2021 06:35  Mg     1.9     03-26        113/68 91 18 97 room air 37.1   greater then 45 minutes spent on discharge

## 2021-03-26 NOTE — PROGRESS NOTE ADULT - ASSESSMENT
71 year old with CLL, not on recent treatment , c completed two covid vaccines. Cll present for about 7 years. Complaints of chills, cough, CP sweats, no fever for several days.   Chest xray with LLL infiltrate.   Awiting Covid ( doubt).  Most likely pneumococcal pna     legionella urine ag pending   seems better this am  comfortable on RA  not SOB  ceftriaxone      doing better  comfortable on RA   pt can be discharged on po ceftin 500 mg bid to complete 10 days   pt needs follow up with hematology at some point to determine whether he would benefit from immunoglobulin replacement

## 2021-03-26 NOTE — DISCHARGE NOTE PROVIDER - NSDCCPCAREPLAN_GEN_ALL_CORE_FT
PRINCIPAL DISCHARGE DIAGNOSIS  Diagnosis: CAP (community acquired pneumonia)  Assessment and Plan of Treatment: Take all medications as prescribed   Ceftin 500mg po twice daily for 10 days  Follow up with Dr Vickey PULIDO   follow up you oncologist/hematologist  call for appointment  Follow up with Dr coronel pulmonologist call for appointment  call our office or fever chills or any concerns [phone]

## 2021-03-26 NOTE — PROGRESS NOTE ADULT - ASSESSMENT
71 year old with CLL, not on recent treatment , c completed two covid vaccines. Cll present for about 7 years. Complaints of chills, cough, CP sweats, no fever for several days.   Chest xray with LLL infiltrate. DDX M. pna/C. 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  *************  3/25-slightly better over all-minimal ambulation  3/26-as per ID-off zith, continued on ceftriaxone D4--? switch to ceftin 500 bid to complete course (8 days)

## 2021-03-26 NOTE — PROGRESS NOTE ADULT - SUBJECTIVE AND OBJECTIVE BOX
CHIEF COMPLAINT: f/up sob, pna, rads, ? osas--slightly better -less cough and wheeze    Interval Events: ambulating and showered-off zithromax    REVIEW OF SYSTEMS:  Constitutional: No fevers or chills. No weight loss. + fatigue or generalized malaise.  Eyes: No itching or discharge from the eyes  ENT: No ear pain. No ear discharge. No nasal congestion. No post nasal drip. No epistaxis. No throat pain. No sore throat. No difficulty swallowing.   CV: No chest pain. No palpitations. No lightheadedness or dizziness.   Resp: No dyspnea at rest. + dyspnea on exertion. No orthopnea. No wheezing. + cough. No stridor. + sputum production. No chest pain with respiration.  GI: No nausea. No vomiting. No diarrhea.  MSK: No joint pain or pain in any extremities  Integumentary: No skin lesions. No pedal edema.  Neurological: No gross motor weakness. No sensory changes.  [+ ] All other systems negative  [ ] Unable to assess ROS because ________    OBJECTIVE:  ICU Vital Signs Last 24 Hrs  T(C): 37.1 (25 Mar 2021 20:25), Max: 37.1 (25 Mar 2021 14:17)  T(F): 98.7 (25 Mar 2021 20:25), Max: 98.8 (25 Mar 2021 14:17)  HR: 91 (25 Mar 2021 20:25) (82 - 91)  BP: 132/78 (25 Mar 2021 20:25) (111/65 - 147/79)  BP(mean): --  ABP: --  ABP(mean): --  RR: 18 (25 Mar 2021 20:25) (18 - 19)  SpO2: 95% (25 Mar 2021 20:25) (95% - 98%)        03-24 @ 07:01  -  03-25 @ 07:00  --------------------------------------------------------  IN: 610 mL / OUT: 900 mL / NET: -290 mL    03-25 @ 07:01  -  03-26 @ 05:27  --------------------------------------------------------  IN: 1110 mL / OUT: 1000 mL / NET: 110 mL      CAPILLARY BLOOD GLUCOSE          PHYSICAL EXAM:  General: Awake, alert, oriented X 3.   HEENT: Atraumatic, normocephalic.                 Mallampatti Grade                 No nasal congestion.                No tonsillar or pharyngeal exudates.  Lymph Nodes: No palpable lymphadenopathy  Neck: No JVD. No carotid bruit.   Respiratory: Normal chest expansion                         Normal percussion                         Normal and equal air entry                         No wheeze, rhonchi or rales.  Cardiovascular: S1 S2 normal. No murmurs, rubs or gallops.   Abdomen: Soft, non-tender, non-distended. No organomegaly. Normoactive bowel sounds.  Extremities: Warm to touch. Peripheral pulse palpable. No pedal edema.   Skin: No rashes or skin lesions  Neurological: Motor and sensory examination equal and normal in all four extremities.  Psychiatry: Appropriate mood and affect.    HOSPITAL MEDICATIONS:  MEDICATIONS  (STANDING):  albuterol/ipratropium for Nebulization 3 milliLiter(s) Nebulizer every 6 hours  budesonide 160 MICROgram(s)/formoterol 4.5 MICROgram(s) Inhaler 2 Puff(s) Inhalation two times a day  cefTRIAXone   IVPB 1000 milliGRAM(s) IV Intermittent every 24 hours  finasteride 5 milliGRAM(s) Oral daily  influenza   Vaccine 0.5 milliLiter(s) IntraMuscular once  PARoxetine 10 milliGRAM(s) Oral daily  sodium chloride 0.9% lock flush 3 milliLiter(s) IV Push every 8 hours  tamsulosin 0.4 milliGRAM(s) Oral at bedtime    MEDICATIONS  (PRN):      LABS:                        12.1   67.43 )-----------( 132      ( 25 Mar 2021 05:33 )             39.1     03-25    141  |  105  |  23  ----------------------------<  114<H>  4.4   |  24  |  0.93    Ca    9.3      25 Mar 2021 05:33    TPro  6.2  /  Alb  3.9  /  TBili  0.4  /  DBili  x   /  AST  24  /  ALT  23  /  AlkPhos  90  03-24              MICROBIOLOGY:     RADIOLOGY:  [ ] Reviewed and interpreted by me    Point of Care Ultrasound Findings:    PFT:    EKG:

## 2021-03-26 NOTE — DISCHARGE NOTE NURSING/CASE MANAGEMENT/SOCIAL WORK - PATIENT PORTAL LINK FT
You can access the FollowMyHealth Patient Portal offered by [facility] by registering at the following website: [email or web address]. By joining [employer]s FollowMyHealth portal, you will also be able to view your health information using other applications (apps) compatible with our system.

## 2021-03-26 NOTE — CONSULT NOTE ADULT - CONVERSATION DETAILS
The patient gave verbalized understanding about his current illness (PNA) and active Rxs. We discussed about his life and the difficult time he had after his wife . He let me know about his grandkids being his motivation in life. He stated his children are of a lot of support and extremely concerned about his health and wellbeing.     He denied having a HCP but accepted a HCP form that he was going to review with his son (Nino) and trying to complete it while at home.     When I asked about completing a HCP, the patient himself introduced the concept of life support and indicating that if he were to be a "vegetable" and "dependent on machines" that at that point he did not want to prolong his life. He expressed his wife went to a difficult time with CA and that at some point due to her impaired QOL, he had to make difficult decisions in order to not prolong her suffering. He stated his children knew his wishes and what to do if facing a situation like the one described above.     16' were spent in ACP discussing about completing a HCP and his perception about life prolonging treatments in a situation were he is comatose and without hope for recovery.

## 2021-03-26 NOTE — DISCHARGE NOTE PROVIDER - NSDCMRMEDTOKEN_GEN_ALL_CORE_FT
budesonide-formoterol 160 mcg-4.5 mcg/inh inhalation aerosol: 2 puff(s) inhaled 2 times a day   cefuroxime 500 mg oral tablet: 1 tab(s) orally every 12 hours   Combivent Respimat 20 mcg-100 mcg/inh inhalation aerosol: 1 puff(s) inhaled every 6 hours   finasteride 5 mg oral tablet: 1 tab(s) orally once a day  PARoxetine 10 mg oral tablet: 1 tab(s) orally once a day  tamsulosin 0.4 mg oral capsule: 1 cap(s) orally once a day (at bedtime)

## 2021-03-26 NOTE — CONSULT NOTE ADULT - PROBLEM SELECTOR RECOMMENDATION 9
Abx as per ID.   No h/o dysphagia.  He indicated to be already received x 2 doses of  COVID-19 vaccine

## 2021-03-26 NOTE — DISCHARGE NOTE PROVIDER - CARE PROVIDERS DIRECT ADDRESSES
[email or web address]

## 2021-03-26 NOTE — PROGRESS NOTE ADULT - TIME BILLING
as above-improved over admission  dyspnea due to PNA in face of CLL--DDX M/C/legionella pna or typicals such as strep PNA w/ associated bronchospasm;        associated bronchospastic disease, over all overweight and out of shape.  PNA- C/M titers, legionella ag, blood cx,   f/up CXR PA/Lat today, IV ceftriaxone-D4-? po ceftin 500 bid  Bronchospasm--prednisone 40mg per day, symbicort 160 2 bid, albuterol via HHN q 6, acapella, incentive spirometry  ? OSAS-out pt SS  GERD-pepcid 20 qhs        DVT prophylaxis         CLL-heme f/up here  DC planning-? next 24 hrs  Lux Severino MD-Pulmonary   [phone]
as above-improving over all-less wheeze  dyspnea due to PNA in face of CLL--DDX M/C/legionella pna or typicals such as strep PNA w/ associated bronchospasm;        associated bronchospastic disease, over all overweight and out of shape.  PNA- C/M titers, legionella ag, blood cx,   f/up CXR PA/Lat next 24-48 hours, IV ceftriaxone/zithromax  Bronchospasm--prednisone 40mg per day, symbicort 160 2 bid, albuterol via HHN q 6, acapella, incentive spirometry  ? OSAS-out pt SS  GERD-pepcid 20 qhs        DVT prophylaxis         CLL-heme f/up here  DC planning  Lux Severino MD-Pulmonary   [phone]

## 2021-03-26 NOTE — DISCHARGE NOTE PROVIDER - CARE PROVIDER_API CALL
Dakota Hurd)  Infectious Disease; Internal Medicine  [address] [place] [address]  Phone: [phone]  Fax: [phone]  Follow Up Time: Routine    Lux Severino)  Internal Medicine; Pulmonary Disease  [address] [facility] [facility] [address]  [place] [address]  Phone: [phone]  Fax: [phone]  Follow Up Time: Routine

## 2021-03-26 NOTE — PROGRESS NOTE ADULT - SUBJECTIVE AND OBJECTIVE BOX
infectious diseases progress note:    Patient is a 70y old  Male who presents with a chief complaint of Cough , SOB (26 Mar 2021 05:26)        Pneumonia due to organism               Allergies    Bactrim (Unknown)    Intolerances        ANTIBIOTICS/RELEVANT:  antimicrobials  cefTRIAXone   IVPB 1000 milliGRAM(s) IV Intermittent every 24 hours    immunologic:  influenza   Vaccine 0.5 milliLiter(s) IntraMuscular once    OTHER:  albuterol/ipratropium for Nebulization 3 milliLiter(s) Nebulizer every 6 hours  budesonide 160 MICROgram(s)/formoterol 4.5 MICROgram(s) Inhaler 2 Puff(s) Inhalation two times a day  finasteride 5 milliGRAM(s) Oral daily  PARoxetine 10 milliGRAM(s) Oral daily  sodium chloride 0.9% lock flush 3 milliLiter(s) IV Push every 8 hours  tamsulosin 0.4 milliGRAM(s) Oral at bedtime      Objective:  Vital Signs Last 24 Hrs  T(C): 37.1 (26 Mar 2021 05:30), Max: 37.1 (25 Mar 2021 14:17)  T(F): 98.8 (26 Mar 2021 05:30), Max: 98.8 (25 Mar 2021 14:17)  HR: 91 (26 Mar 2021 05:30) (82 - 91)  BP: 113/68 (26 Mar 2021 05:30) (111/65 - 147/79)  BP(mean): --  RR: 18 (26 Mar 2021 05:30) (18 - 19)  SpO2: 97% (26 Mar 2021 05:30) (95% - 98%)       Eyes:KOLTON, EOMI  Ear/Nose/Throat: no oral lesion, no sinus tenderness on percussion	  Neck:no JVD, no lymphadenopathy, supple  Respiratory: CTA kristopher  Cardiovascular: S1S2 RRR, no murmurs  Gastrointestinal:soft, (+) BS, no HSM  Extremities:no e/e/c        LABS:                        11.8   66.73 )-----------( 138      ( 26 Mar 2021 06:35 )             38.9     03-26    142  |  103  |  26<H>  ----------------------------<  117<H>  5.5<H>   |  25  |  0.98    Ca    9.7      26 Mar 2021 06:35  Mg     1.9     03-26              MICROBIOLOGY:    RECENT CULTURES:  03-23 @ 21:24 .Blood Blood-Peripheral                No growth to date.          RESPIRATORY CULTURES:              RADIOLOGY & ADDITIONAL STUDIES:        Pager [phone]  After 5 pm/weekends or if no response :[phone]

## 2021-03-26 NOTE — CONSULT NOTE ADULT - ASSESSMENT
This is a 70 y/o male with PMHx of CLL who presented initially with cough/SOB and night sweats x 4 days, in addition had LLL infiltrate on CXR and was admitted for further work-up and treatment.  Dr. Hurd (ID) consulted, placed on Ceftriaxone daily, COVID turned up negative from 3/24/21, blood cultures NGTD.  Pulmonary consulted and following for LLL PNA.

## 2021-03-26 NOTE — CONSULT NOTE ADULT - SUBJECTIVE AND OBJECTIVE BOX
HPI:  This is a 72 y/o male with PMHx of CLL who presented initially with cough/SOB and night sweats x 4 days, in addition had LLL infiltrate on CXR and was admitted for further work-up and treatment.  Dr. Hurd (ID) consulted, placed on Ceftriaxone daily, COVID turned up negative from 3/24/21, blood cultures NGTD.  Pulmonary consulted and following for LLL PNA.      3/24 VSS; low grade temps ; ID and pulm following for LLL PNA; covid neg; bc testing; wbc 68- secondary to CLL; last chemo 2013;   continue IV Rocephin as per ID; no azithromycin at this time; bronchodilators initiated for exp. wheeze; ck immunoglobulin panel as per ID  ck Echo - pt c/o of remote hx of dizziness   3/25 Echo yesterday negative.  Per ID note, hope to change to PO abx in next 48 hours.  F/U immunoglobulin panel (possible mold exposure at home prior to admission).      PERTINENT PM/SXH:   History of BPH    CLL (chronic lymphocytic leukemia)      H/O excision of mass      FAMILY HISTORY:    ITEMS NOT CHECKED ARE NOT PRESENT    SOCIAL HISTORY:   Significant other/partner[ ]  Children[ ]  [employer]/Spirituality:  Substance hx:  [ ]   Tobacco hx:  [ ]   Alcohol hx: [ ]   Home Opioid hx:  [ ] I-Stop Reference No:  Living Situation: [ ]Home  [ ]Long term care  [ ]Rehab [ ]Other   Pt states independent in ADLs prior to hospitalization.  ADVANCE DIRECTIVES:    DNR  MOLST  [ ]  Living Will  [ ]   DECISION MAKER(s):  [ ] Health Care Proxy(s)  [ ] Surrogate(s)  [ ] Guardian           Name(s): Phone Number(s):    BASELINE (I)ADL(s) (prior to admission):  [place]: [ ]Total  [ ] Moderate [ ]Dependent    Allergies    Bactrim (Unknown)    Intolerances    MEDICATIONS  (STANDING):  albuterol/ipratropium for Nebulization 3 milliLiter(s) Nebulizer every 6 hours  budesonide 160 MICROgram(s)/formoterol 4.5 MICROgram(s) Inhaler 2 Puff(s) Inhalation two times a day  cefTRIAXone   IVPB 1000 milliGRAM(s) IV Intermittent every 24 hours  finasteride 5 milliGRAM(s) Oral daily  influenza   Vaccine 0.5 milliLiter(s) IntraMuscular once  PARoxetine 10 milliGRAM(s) Oral daily  sodium chloride 0.9% lock flush 3 milliLiter(s) IV Push every 8 hours  tamsulosin 0.4 milliGRAM(s) Oral at bedtime    MEDICATIONS  (PRN):    PRESENT SYMPTOMS: [ ]Unable to obtain due to poor mentation   Source if other than patient:  [ ]Family   [ ]Team     Pain: [ ]yes [ ]no  QOL impact -   Location -                    Aggravating factors -  Quality -  Radiation -  Timing-  Severity (0-10 scale):  Minimal acceptable level (0-10 scale):     CPOT:    https://www.[facility].org/getattachment/[email or web address]/Critical-Care-Pain-Observation-Tool-(CPOT)      PAIN AD Score:     http://geriatrictoolkit.[email or web address]/cog/painad.pdf (press ctrl +  left click to view)    Dyspnea:                           [ ]Mild [ ]Moderate [ ]Severe  Anxiety:                             [ ]Mild [ ]Moderate [ ]Severe  Fatigue:                             [ ]Mild [ ]Moderate [ ]Severe  Nausea:                             [ ]Mild [ ]Moderate [ ]Severe  Loss of appetite:              [ ]Mild [ ]Moderate [ ]Severe  Constipation:                    [ ]Mild [ ]Moderate [ ]Severe    Other Symptoms:  [ ]All other review of systems negative     Palliative Performance Status Version 2:         %    http://npcrc.org/files/news/palliative_performance_scale_ppsv2.pdf  PHYSICAL EXAM:  Vital Signs Last 24 Hrs  T(C): 37.1 (26 Mar 2021 05:30), Max: 37.1 (25 Mar 2021 14:17)  T(F): 98.8 (26 Mar 2021 05:30), Max: 98.8 (25 Mar 2021 14:17)  HR: 91 (26 Mar 2021 05:30) (82 - 91)  BP: 113/68 (26 Mar 2021 05:30) (111/65 - 147/79)  BP(mean): --  RR: 18 (26 Mar 2021 05:30) (18 - 19)  SpO2: 97% (26 Mar 2021 05:30) (95% - 98%) I&O's Summary    25 Mar 2021 07:01  -  26 Mar 2021 07:00  --------------------------------------------------------  IN: 1210 mL / OUT: 1000 mL / NET: 210 mL      GENERAL:  [ ]Alert  [ ]Oriented x   [ ]Lethargic  [ ]Cachexia  [ ]Unarousable  [ ]Verbal  [ ]Non-Verbal  Behavioral:   [ ] Anxiety  [ ] Delirium [ ] Agitation [ ] Other  HEENT:  [ ]Normal   [ ]Dry mouth   [ ]ET Tube/Trach  [ ]Oral lesions  PULMONARY:   [ ]Clear [ ]Tachypnea  [ ]Audible excessive secretions   [ ]Rhonchi        [ ]Right [ ]Left [ ]Bilateral  [ ]Crackles        [ ]Right [ ]Left [ ]Bilateral  [ ]Wheezing     [ ]Right [ ]Left [ ]Bilateral  [ ]Diminished breath sounds [ ]right [ ]left [ ]bilateral  CARDIOVASCULAR:    [ ]Regular [ ]Irregular [ ]Tachy  [ ]Ja [ ]Murmur [ ]Other  GASTROINTESTINAL:  [ ]Soft  [ ]Distended   [ ]+BS  [ ]Non tender [ ]Tender  [ ]PEG [ ]OGT/ NGT  Last BM:     GENITOURINARY:  [ ]Normal [ ] Incontinent   [ ]Oliguria/Anuria   [ ]Badillo  MUSCULOSKELETAL:   [ ]Normal   [ ]Weakness  [ ]Bed/Wheelchair bound [ ]Edema  NEUROLOGIC:   [ ]No focal deficits  [ ]Cognitive impairment  [ ]Dysphagia [ ]Dysarthria [ ]Paresis [ ]Other   SKIN:   [ ]Normal    [ ]Rash  [ ]Pressure ulcer(s)       Present on admission [ ]y [ ]n    CRITICAL CARE:  [ ] Shock Present  [ ]Septic [ ]Cardiogenic [ ]Neurologic [ ]Hypovolemic  [ ]  Vasopressors [ ]  Inotropes   [ ]Respiratory failure present [ ]Mechanical ventilation [ ]Non-invasive ventilatory support [ ]High flow    [ ]Acute  [ ]Chronic [ ]Hypoxic  [ ]Hypercarbic [ ]Other  [ ]Other organ failure     LABS:                        11.8   66.73 )-----------( 138      ( 26 Mar 2021 06:35 )             38.9   03-26    x   |  x   |  x   ----------------------------<  x   4.9   |  x   |  x     Ca    9.7      26 Mar 2021 06:35  Mg     1.9     03-26          RADIOLOGY & ADDITIONAL STUDIES:  chest< from: Xray Chest 1 View- PORTABLE-Routine (Xray Chest 1 View- PORTABLE-Routine in AM.) (03.24.21 @ 08:43) >  EXAM:  XR CHEST PORTABLE ROUTINE 1V                            PROCEDURE DATE:  03/24/2021            INTERPRETATION:  A single chest x-ray was obtained on March 24, 2021.    Indication: Cough. Rule out pneumonia.    Impression:    The heart is normal in size. Left lower lobe pneumonia and/or atelectasis. The right lung appears to be clear. No pleural effusion. No pneumothorax. Degenerative changes of the thoracic spine.                MARCOS LESLIE MD; Attending Radiologist  This document has been electronically signed. Mar 24 2021 10:53AM    < end of copied text >    PROTEIN CALORIE MALNUTRITION PRESENT: [ ]mild [ ]moderate [ ]severe [ ]underweight [ ]morbid obesity  https://www.andeal.org/vault/2440/web/files/ONC/Table_Clinical%20Characteristics%20to%20Document%20Malnutrition-White%20JV%20et%20al%547965.pdf    Height (cm): 165.1 (03-23-21 @ 15:34)  Weight (kg): 77.6 (03-23-21 @ 15:34)  BMI (kg/m2): 28.5 (03-23-21 @ 15:34)    [ ]PPSV2 < or = to 30% [ ]significant weight loss  [ ]poor nutritional intake  [ ]anasarca     Albumin, Serum: 3.9 g/dL (03-24-21 @ 06:08)   [ ]Artificial Nutrition      REFERRALS:   [ ]Chaplaincy  [ ]Hospice  [ ]Child Life  [ ]Social Work  [ ]Case management [ ]Holistic Therapy     Goals of Care Document:  HPI:  This is a 72 y/o male with PMHx of CLL who presented initially with cough/SOB and night sweats x 4 days, in addition had LLL infiltrate on CXR and was admitted for further work-up and treatment.  Dr. Hurd (ID) consulted, placed on Ceftriaxone daily, COVID turned up negative from 3/24/21, blood cultures NGTD.  Pulmonary consulted and following for LLL PNA.      3/26 The patient was walking around the hallway and did not appear to be on any acute distress. He indicated cough has improved and that he was ready for being DC. He denied pain or dyspnea.       PERTINENT PM/SXH:   History of BPH    CLL (chronic lymphocytic leukemia)      H/O excision of mass      FAMILY HISTORY:    ITEMS NOT CHECKED ARE NOT PRESENT    SOCIAL HISTORY:   Significant other/partner[ x]  Children[ x]  [employer]/Spirituality:  Substance hx:  [ ]   Tobacco hx:  [ ]   Alcohol hx: [ ]   Home Opioid hx:  [ ] I-Stop Reference No:  Living Situation: [ x]Home  [ ]Long term care  [ ]Rehab [ ]Other   Pt states independent in ADLs prior to hospitalization.  He indicated he lives in a house where there are 3 apartments. Two of his children live in the same building one upstairs and one down stairs and both of them help him out when needed. He described his children as really involved into his care. He also indicated that one of them is a PA working at [employer].   ADVANCE DIRECTIVES:    DNR  MOLST  [ ]  Living Will  [ ]   DECISION MAKER(s):  [ ] Health Care Proxy(s)  [x ] Surrogate(s)  [ ] Guardian           Name(s): Phone Number(s): Children     BASELINE (I)ADL(s) (prior to admission):  [place]: [x ]Total  [ ] Moderate [ ]Dependent    Allergies    Bactrim (Unknown)    Intolerances    MEDICATIONS  (STANDING):  albuterol/ipratropium for Nebulization 3 milliLiter(s) Nebulizer every 6 hours  budesonide 160 MICROgram(s)/formoterol 4.5 MICROgram(s) Inhaler 2 Puff(s) Inhalation two times a day  cefTRIAXone   IVPB 1000 milliGRAM(s) IV Intermittent every 24 hours  finasteride 5 milliGRAM(s) Oral daily  influenza   Vaccine 0.5 milliLiter(s) IntraMuscular once  PARoxetine 10 milliGRAM(s) Oral daily  sodium chloride 0.9% lock flush 3 milliLiter(s) IV Push every 8 hours  tamsulosin 0.4 milliGRAM(s) Oral at bedtime    MEDICATIONS  (PRN):    PRESENT SYMPTOMS: [ ]Unable to obtain due to poor mentation   Source if other than patient:  [ ]Family   [ ]Team     Pain: [ ]yes [ x]no  QOL impact -   Location -                    Aggravating factors -  Quality -  Radiation -  Timing-  Severity (0-10 scale):  Minimal acceptable level (0-10 scale):     CPOT:    https://www.[facility].org/getattachment/[email or web address]/Critical-Care-Pain-Observation-Tool-(CPOT)      PAIN AD Score:     http://geriatrictoolkit.[facility]/cog/painad.pdf (press ctrl +  left click to view)    Dyspnea:                           [ ]Mild [ ]Moderate [ ]Severe  Anxiety:                             [ ]Mild [ ]Moderate [ ]Severe  Fatigue:                             [ ]Mild [ ]Moderate [ ]Severe  Nausea:                             [ ]Mild [ ]Moderate [ ]Severe  Loss of appetite:              [ ]Mild [ ]Moderate [ ]Severe  Constipation:                    [ ]Mild [ ]Moderate [ ]Severe    Other Symptoms:  [x ]All other review of systems negative but mild issues with balance. No h/o prior falls during the last year, as per him.     Palliative Performance Status Version 2:   80      %    http://npcrc.org/files/news/palliative_performance_scale_ppsv2.pdf  PHYSICAL EXAM:  Vital Signs Last 24 Hrs  T(C): 37.1 (26 Mar 2021 05:30), Max: 37.1 (25 Mar 2021 14:17)  T(F): 98.8 (26 Mar 2021 05:30), Max: 98.8 (25 Mar 2021 14:17)  HR: 91 (26 Mar 2021 05:30) (82 - 91)  BP: 113/68 (26 Mar 2021 05:30) (111/65 - 147/79)  BP(mean): --  RR: 18 (26 Mar 2021 05:30) (18 - 19)  SpO2: 97% (26 Mar 2021 05:30) (95% - 98%) I&O's Summary    25 Mar 2021 07:01  -  26 Mar 2021 07:00  --------------------------------------------------------  IN: 1210 mL / OUT: 1000 mL / NET: 210 mL      GENERAL:  [x ]Alert  [x ]Oriented x 3 [ ]Lethargic  [ ]Cachexia  [ ]Unarousable  [ ]Verbal  [ ]Non-Verbal  Behavioral:   [ ] Anxiety  [ ] Delirium [ ] Agitation [ ] Other  HEENT:  [ x]Normal   [ ]Dry mouth   [ ]ET Tube/Trach  [ ]Oral lesions  PULMONARY:   [ ]Clear [ ]Tachypnea  [ ]Audible excessive secretions   [ ]Rhonchi        [ ]Right [ ]Left [ ]Bilateral  [ ]Crackles        [ ]Right [ ]Left [ ]Bilateral  [ ]Wheezing     [ ]Right [ ]Left [ ]Bilateral  [x ]Diminished breath sounds [ ]right [ x]left [ ]bilateral  CARDIOVASCULAR:    [ x]Regular [ ]Irregular [ ]Tachy  [ ]Ja [ ]Murmur [ ]Other  GASTROINTESTINAL:  [ x]Soft  [ ]Distended   [ ]+BS  [ ]Non tender [ ]Tender  [ ]PEG [ ]OGT/ NGT  Last BM:     GENITOURINARY:  [x ]Normal [ ] Incontinent   [ ]Oliguria/Anuria   [ ]Badillo  MUSCULOSKELETAL:   [ ]Normal   [x ]Weakness  [ ]Bed/Wheelchair bound [ ]Edema  NEUROLOGIC:   [ x]No focal deficits  [ ]Cognitive impairment  [ ]Dysphagia [ ]Dysarthria [ ]Paresis [ ]Other   SKIN:   [x ]Normal    [ ]Rash  [ ]Pressure ulcer(s)       Present on admission [ ]y [ ]n    CRITICAL CARE:  [ ] Shock Present  [ ]Septic [ ]Cardiogenic [ ]Neurologic [ ]Hypovolemic  [ ]  Vasopressors [ ]  Inotropes   [ ]Respiratory failure present [ ]Mechanical ventilation [ ]Non-invasive ventilatory support [ ]High flow    [ ]Acute  [ ]Chronic [ ]Hypoxic  [ ]Hypercarbic [ ]Other  [ ]Other organ failure     LABS:                        11.8   66.73 )-----------( 138      ( 26 Mar 2021 06:35 )             38.9   03-26    x   |  x   |  x   ----------------------------<  x   4.9   |  x   |  x     Ca    9.7      26 Mar 2021 06:35  Mg     1.9     03-26          RADIOLOGY & ADDITIONAL STUDIES:  chest< from: Xray Chest 1 View- PORTABLE-Routine (Xray Chest 1 View- PORTABLE-Routine in AM.) (03.24.21 @ 08:43) >  EXAM:  XR CHEST PORTABLE ROUTINE 1V                            PROCEDURE DATE:  03/24/2021            INTERPRETATION:  A single chest x-ray was obtained on March 24, 2021.    Indication: Cough. Rule out pneumonia.    Impression:    The heart is normal in size. Left lower lobe pneumonia and/or atelectasis. The right lung appears to be clear. No pleural effusion. No pneumothorax. Degenerative changes of the thoracic spine.                MARCOS LESLIE MD; Attending Radiologist  This document has been electronically signed. Mar 24 2021 10:53AM    < end of copied text >    PROTEIN CALORIE MALNUTRITION PRESENT: [ ]mild [ ]moderate [ ]severe [ ]underweight [ ]morbid obesity  https://www.andeal.org/vault/2440/web/files/ONC/Table_Clinical%20Characteristics%20to%20Document%20Malnutrition-White%20JV%20et%20al%120679.pdf    Height (cm): 165.1 (03-23-21 @ 15:34)  Weight (kg): 77.6 (03-23-21 @ 15:34)  BMI (kg/m2): 28.5 (03-23-21 @ 15:34)    [ ]PPSV2 < or = to 30% [ ]significant weight loss  [ ]poor nutritional intake  [ ]anasarca     Albumin, Serum: 3.9 g/dL (03-24-21 @ 06:08)   [ ]Artificial Nutrition      REFERRALS:   [ ]Chaplaincy  [ ]Hospice  [ ]Child Life  [ ]Social Work  [ ]Case management [ ]Holistic Therapy     Goals of Care Document:

## 2021-03-26 NOTE — CONSULT NOTE ADULT - NS PRO AD NO ADVANCE DIRECTIVE
MARIA G clinical note - This pt is a [facility] readmission. Pt was enrolled in [facility] on 2/21/18 under . MARIA G spoke with both the patient and her son at that time. No

## 2021-03-26 NOTE — DISCHARGE NOTE PROVIDER - HOSPITAL COURSE
This is a 70 y/o male with PMHx of CLL who presented initially with cough/SOB and night sweats x 4 days, in addition had LLL infiltrate on CXR and was admitted for further work-up and treatment.  Dr. Hurd (ID) consulted, placed on Ceftriaxone daily, COVID turned up negative from 3/24/21, blood cultures NGTD.  Pulmonary consulted and following for LLL PNA.      3/24 VSS; low grade temps ; ID and pulm following for LLL PNA; covid neg; bc testing; wbc 68- secondary to CLL; last chemo 2013;   continue IV Rocephin as per ID; no azithromycin at this time; bronchodilators initiated for exp. wheeze; ck immunoglobulin panel as per ID  ck Echo - pt c/o of remote hx of dizziness   3/25 Echo yesterday negative.  Per ID note, hope to change to PO abx in next 48 hours.  F/U immunoglobulin panel (possible mold exposure at home prior to admission).  3/26 VSS afebrile XRaythis am Cleareed by ID  Dr Hurd  pt can be discharged on po ceftin 500 mg bid to complete 10 days   pt needs follow up with hematology at some point to determine whether he would benefit from immunoglobulin replacement .   Seen and examined in no acute distress. Stable and eager for  discharge.

## 2021-03-26 NOTE — CONSULT NOTE ADULT - CONSULT REASON
sob, PNA
72 yo with CLL with LLL PNA identified as at risk of readmission. Geriatrics and palliative care will be seen for resources in order to avoid readmissions.
pna

## 2021-03-26 NOTE — PROVIDER CONTACT NOTE (CRITICAL VALUE NOTIFICATION) - ACTION/TREATMENT ORDERED:
inhale/exhale and max phonation to assess breath support. Max inhale was measured at 4 seconds, max exhale of 6 seconds, and max phonation average measured 13 seconds. [facility] Examination:   Labial ROM: Decreased bilaterally during retraction/protrusion   Labial Strength: Decreased during compression   Labial Coordination: Slowed movement, but coordinated   Lingual ROM: Decreased during elevation and bilaterally   Lingual Strength: Decreased  Lingual Coordination: Slowed movement       Language Formal Assessment:  The Cognitive Linguistic Quick Test and portions of the The [employer], the Western Aphasia Battery, and the Crash Inventory were used to assess the patients cognitive linguistic skills including auditory processing, recall/short-term memory with time delays and environmental distracters and problem solving/reasoning, as well as overall expressive language skills. Results are listed below.       Patient was oriented to biographical, temporal, and spatial information. In relation to patients memory/recall ability, patient was 9 out of 10 during immediate recall trial.  A small paragraph of information was read to the patient with the patient only recalling 7 out of 18 details from the paragraph. No time delays and/or distracters were presented. No cues and/or prompts were given to assist in the patients recall of paragraph details. Patient was 4/6 in completing design memory task.      Symbol trails and mazes were nonlinguistic tasks that were presented to assess planning, self-monitoring/executive function skills, working memory, mental flexibility and visual planning and discrimination. When presented the symbol trails and mazes, patient did have difficulty with the latter tasks which required more planning and mental flexibility. Patient was 5/10 in completing symbols trails task.      Symbol Cancellation was a nonlinguistic task of visual attention and perception. It provides information regarding the integrity of the upper and lower quadrants of the left and right visual fields. Errors of omission and commission may be secondary to generalized inattention, visual discrimination deficits, partial or full hemianopsia, visual neglect and /or inattention to one side and/or quadrant of space. Patient cancelled 20 items among several stimuli with only 8 being correct. Deficits in confrontational naming are typically a key symptom of aphasia that can result from various forms of brain damage (e.g. stroke, head injury, dementia, tumors, and infections). Patient demonstrated no delays in naming objects. During generative naming tasks, patient demonstrated mod-severe difficulty with organization of thought as well as retrieving items within a semantic class/category given a time constraint. Deficits in auditory comprehension were noted as questions became more complex. Patient was 100% on yes/no questions, 100% during 2 step directions and 60% during 3 step directions. The term executive function describes a set of cognitive abilities that control and regulate other abilities and behaviors. Executive functions are necessary for goal-directed behavior. They include the ability to initiate and stop actions, to monitor and change behavior as needed, and to plan future behavior when faced with novel tasks and situations. Executive functions allow us to anticipate outcomes and adapt to changing situations. The ability to form concepts and think abstractly is often considered components of executive function.      As the name implies, executive functions are high-level abilities that influence more basic abilities like attention, memory and motor skills. Executive functions are important for successful adaptation and performance in real-life situations. They allow people to initiate and complete tasks and to persevere in the face of challenges.  Because the environment can be unpredictable, executive functions are vital to human ability to recognize the significance of unexpected situations and to make alternative plans quickly when unusual events arise and interfere with normal routines. In this way, executive function contributes to success in work and at home and allows people to manage the stresses of daily life. When generating ideas related to reasoning/problem solving tasks, patient was 77%.       Patient also completed a clock drawing task to serve as a screening tool for all cognitive domains, including visuospatial, planning, number, and time concept deficits. During this task patient was presented a blank [place] and asked to fill all of the numbers and to set the hands to 10 minutes after 11. The patient completed the task with difficulty orienting numbers, spacing numbers, length of hands, and origination of hands.      Cognitive Linguistic Quick Test:  COGNITIVE DOMAIN SCORE SEVERITY RANGE SEVERITY RATING   Attention 37 39-0 Severe   Memory 129 140-115 Mild   Executive Function 12 13-8 Moderate   Language 26 27-25 Mild   Visuospatial Skills 30 36-22 Moderate   Composite Severity Rating 2.2 2.4-1.5 Moderate      Portions of RIPA, WAB, Crash  Categories Maximum Score Score/Percentage Yielded   Auditory Comprehension 10/10 100%   Ability to Follow 2 Step Auditory Commands 5/5 100%   Ability to Follow 3 Step Auditory Commands 3/5 60%   Immediate Memory/Repeat Forwards (up to 6 numbers/words) 9/10 90%   Problem Solving and Reasoning 10/13 77%       SUMMARY:  Based on case history, formal and informal evaluation, Han Mir exhibits moderate cognitive-linguistic deficits in the areas of executive function and visuospatial skills, severe deficit in the area of attention, and mild deficits in the areas of memory and language. Patient also exhibits dysarthria as characterized by decreased strength and ROM (lingual and labial) and low volume of speech.      Speech therapy Bernie Charles NP aware- no intervention d/t known CLL. Will continue to monitor patient

## 2021-03-26 NOTE — PROGRESS NOTE ADULT - PROVIDER SPECIALTY LIST ADULT
Infectious Disease
CT Surgery
Infectious Disease
Infectious Disease
Pulmonology
CT Surgery
Pulmonology

## 2021-03-26 NOTE — CONSULT NOTE ADULT - PROBLEM SELECTOR RECOMMENDATION 3
On Paroxetine.   The patient shared with me that after his wife , 6 year ago, he became extremely depressed and lost his desire to keep going. However, that after his grandkids were born, his desire to live came back. However, that due to isolation, getting in touch with his grand kids. This issues has improved as this last surge has become more controlled and after he was vaccinated against COVID.

## 2021-03-26 NOTE — DISCHARGE NOTE PROVIDER - PROVIDER TOKENS
PROVIDER:[TOKEN:[[id number]],FOLLOWUP:[Routine]],PROVIDER:[TOKEN:[[id number]],FOLLOWUP:[Routine]]

## 2021-03-27 DIAGNOSIS — Z71.89 OTHER SPECIFIED COUNSELING: ICD-10-CM

## 2021-03-27 DIAGNOSIS — F32.9 MAJOR DEPRESSIVE DISORDER, SINGLE EPISODE, UNSPECIFIED: ICD-10-CM

## 2021-03-27 DIAGNOSIS — Z51.5 ENCOUNTER FOR PALLIATIVE CARE: ICD-10-CM

## 2021-03-27 DIAGNOSIS — J18.9 PNEUMONIA, UNSPECIFIED ORGANISM: ICD-10-CM

## 2021-03-27 LAB
C PNEUM IGM TITR SER: SIGNIFICANT CHANGE UP
CHLAMYDIA AB SER-ACNC: SIGNIFICANT CHANGE UP
CHLAMYDIA IGG SER-ACNC: SIGNIFICANT CHANGE UP
CHLAMYDIA PNEUMONIAE IGA: SIGNIFICANT CHANGE UP

## 2021-03-28 LAB
CULTURE RESULTS: SIGNIFICANT CHANGE UP
CULTURE RESULTS: SIGNIFICANT CHANGE UP
M PNEUMO IGM SER-ACNC: 0.01 INDEX — SIGNIFICANT CHANGE UP (ref 0–0.9)
MYCOPLASMA AG SPEC QL: NEGATIVE — SIGNIFICANT CHANGE UP
SPECIMEN SOURCE: SIGNIFICANT CHANGE UP
SPECIMEN SOURCE: SIGNIFICANT CHANGE UP

## 2021-06-22 ENCOUNTER — APPOINTMENT (OUTPATIENT)
Dept: HEART AND VASCULAR | Facility: CLINIC | Age: 71
End: 2021-06-22

## 2021-06-22 PROBLEM — C91.10 CHRONIC LYMPHOCYTIC LEUKEMIA OF B-CELL TYPE NOT HAVING ACHIEVED REMISSION: Chronic | Status: ACTIVE | Noted: 2021-03-23

## 2021-06-22 PROBLEM — Z87.438 PERSONAL HISTORY OF OTHER DISEASES OF MALE GENITAL ORGANS: Chronic | Status: ACTIVE | Noted: 2021-03-23

## 2021-09-30 ENCOUNTER — APPOINTMENT (OUTPATIENT)
Dept: HEART AND VASCULAR | Facility: CLINIC | Age: 71
End: 2021-09-30
Payer: MEDICARE

## 2021-09-30 ENCOUNTER — LABORATORY RESULT (OUTPATIENT)
Age: 71
End: 2021-09-30

## 2021-09-30 PROCEDURE — 36415 COLL VENOUS BLD VENIPUNCTURE: CPT

## 2021-10-05 ENCOUNTER — APPOINTMENT (OUTPATIENT)
Dept: HEART AND VASCULAR | Facility: CLINIC | Age: 71
End: 2021-10-05
Payer: MEDICARE

## 2021-10-05 ENCOUNTER — NON-APPOINTMENT (OUTPATIENT)
Age: 71
End: 2021-10-05

## 2021-10-05 ENCOUNTER — INPATIENT (INPATIENT)
Facility: HOSPITAL | Age: 71
LOS: 3 days | Discharge: HOME HEALTH | DRG: 187 | End: 2021-10-09
Attending: THORACIC SURGERY (CARDIOTHORACIC VASCULAR SURGERY) | Admitting: THORACIC SURGERY (CARDIOTHORACIC VASCULAR SURGERY)
Payer: MEDICARE

## 2021-10-05 VITALS
DIASTOLIC BLOOD PRESSURE: 80 MMHG | TEMPERATURE: 98 F | SYSTOLIC BLOOD PRESSURE: 126 MMHG | WEIGHT: 171.96 LBS | HEART RATE: 80 BPM | OXYGEN SATURATION: 97 % | HEIGHT: 65 IN | RESPIRATION RATE: 26 BRPM

## 2021-10-05 VITALS
HEART RATE: 86 BPM | RESPIRATION RATE: 14 BRPM | WEIGHT: 174 LBS | BODY MASS INDEX: 32.02 KG/M2 | DIASTOLIC BLOOD PRESSURE: 90 MMHG | SYSTOLIC BLOOD PRESSURE: 140 MMHG | HEIGHT: 62 IN

## 2021-10-05 DIAGNOSIS — R06.02 SHORTNESS OF BREATH: ICD-10-CM

## 2021-10-05 DIAGNOSIS — N40.0 BENIGN PROSTATIC HYPERPLASIA WITHOUT LOWER URINARY TRACT SYMPTOMS: ICD-10-CM

## 2021-10-05 DIAGNOSIS — Z98.890 OTHER SPECIFIED POSTPROCEDURAL STATES: Chronic | ICD-10-CM

## 2021-10-05 DIAGNOSIS — T22.019S: ICD-10-CM

## 2021-10-05 DIAGNOSIS — C91.10 CHRONIC LYMPHOCYTIC LEUKEMIA OF B-CELL TYPE NOT HAVING ACHIEVED REMISSION: ICD-10-CM

## 2021-10-05 LAB
25(OH)D3 SERPL-MCNC: 89.1 NG/ML
ALBUMIN SERPL ELPH-MCNC: 4.3 G/DL — SIGNIFICANT CHANGE UP (ref 3.3–5)
ALBUMIN SERPL ELPH-MCNC: 4.5 G/DL
ALP BLD-CCNC: 79 U/L
ALP SERPL-CCNC: 77 U/L — SIGNIFICANT CHANGE UP (ref 40–120)
ALT FLD-CCNC: 9 U/L — LOW (ref 10–45)
ALT SERPL-CCNC: 8 U/L
ANION GAP SERPL CALC-SCNC: 13 MMOL/L
ANION GAP SERPL CALC-SCNC: 14 MMOL/L — SIGNIFICANT CHANGE UP (ref 5–17)
APPEARANCE UR: CLEAR — SIGNIFICANT CHANGE UP
AST SERPL-CCNC: 12 U/L
AST SERPL-CCNC: 19 U/L — SIGNIFICANT CHANGE UP (ref 10–40)
BASOPHILS # BLD AUTO: 0 K/UL
BASOPHILS # BLD AUTO: 0 K/UL — SIGNIFICANT CHANGE UP (ref 0–0.2)
BASOPHILS NFR BLD AUTO: 0 %
BASOPHILS NFR BLD AUTO: 0 % — SIGNIFICANT CHANGE UP (ref 0–2)
BILIRUB SERPL-MCNC: 0.4 MG/DL — SIGNIFICANT CHANGE UP (ref 0.2–1.2)
BILIRUB SERPL-MCNC: 0.5 MG/DL
BILIRUB UR-MCNC: NEGATIVE — SIGNIFICANT CHANGE UP
BLD GP AB SCN SERPL QL: NEGATIVE — SIGNIFICANT CHANGE UP
BUN SERPL-MCNC: 15 MG/DL — SIGNIFICANT CHANGE UP (ref 7–23)
BUN SERPL-MCNC: 17 MG/DL
CALCIUM SERPL-MCNC: 9.2 MG/DL
CALCIUM SERPL-MCNC: 9.5 MG/DL — SIGNIFICANT CHANGE UP (ref 8.4–10.5)
CHLORIDE SERPL-SCNC: 100 MMOL/L — SIGNIFICANT CHANGE UP (ref 96–108)
CHLORIDE SERPL-SCNC: 102 MMOL/L
CHOLEST SERPL-MCNC: 161 MG/DL
CO2 SERPL-SCNC: 25 MMOL/L — SIGNIFICANT CHANGE UP (ref 22–31)
CO2 SERPL-SCNC: 27 MMOL/L
COLOR SPEC: YELLOW — SIGNIFICANT CHANGE UP
CREAT SERPL-MCNC: 1.01 MG/DL
CREAT SERPL-MCNC: 1.01 MG/DL — SIGNIFICANT CHANGE UP (ref 0.5–1.3)
DIFF PNL FLD: ABNORMAL
EOSINOPHIL # BLD AUTO: 0 K/UL — SIGNIFICANT CHANGE UP (ref 0–0.5)
EOSINOPHIL # BLD AUTO: 0.75 K/UL
EOSINOPHIL NFR BLD AUTO: 0 % — SIGNIFICANT CHANGE UP (ref 0–6)
EOSINOPHIL NFR BLD AUTO: 0.9 %
ESTIMATED AVERAGE GLUCOSE: 108 MG/DL
FOLATE SERPL-MCNC: >20 NG/ML
GLUCOSE SERPL-MCNC: 100 MG/DL
GLUCOSE SERPL-MCNC: 88 MG/DL — SIGNIFICANT CHANGE UP (ref 70–99)
GLUCOSE UR QL: NEGATIVE — SIGNIFICANT CHANGE UP
HBA1C MFR BLD HPLC: 5.4 %
HCT VFR BLD CALC: 44.8 %
HCT VFR BLD CALC: 45 % — SIGNIFICANT CHANGE UP (ref 39–50)
HDLC SERPL-MCNC: 41 MG/DL
HGB BLD-MCNC: 13 G/DL
HGB BLD-MCNC: 13.1 G/DL — SIGNIFICANT CHANGE UP (ref 13–17)
KETONES UR-MCNC: NEGATIVE — SIGNIFICANT CHANGE UP
LDLC SERPL CALC-MCNC: 98 MG/DL
LEUKOCYTE ESTERASE UR-ACNC: NEGATIVE — SIGNIFICANT CHANGE UP
LYMPHOCYTES # BLD AUTO: 76.79 K/UL
LYMPHOCYTES # BLD AUTO: 81.92 K/UL — HIGH (ref 1–3.3)
LYMPHOCYTES # BLD AUTO: 93 % — HIGH (ref 13–44)
LYMPHOCYTES NFR BLD AUTO: 92.2 %
MAN DIFF?: NORMAL
MANUAL SMEAR VERIFICATION: SIGNIFICANT CHANGE UP
MCHC RBC-ENTMCNC: 26.7 PG — LOW (ref 27–34)
MCHC RBC-ENTMCNC: 28.4 PG
MCHC RBC-ENTMCNC: 29 GM/DL
MCHC RBC-ENTMCNC: 29.1 GM/DL — LOW (ref 32–36)
MCV RBC AUTO: 91.8 FL — SIGNIFICANT CHANGE UP (ref 80–100)
MCV RBC AUTO: 97.8 FL
MONOCYTES # BLD AUTO: 0.88 K/UL — SIGNIFICANT CHANGE UP (ref 0–0.9)
MONOCYTES # BLD AUTO: 1.42 K/UL
MONOCYTES NFR BLD AUTO: 1 % — LOW (ref 2–14)
MONOCYTES NFR BLD AUTO: 1.7 %
NEUTROPHILS # BLD AUTO: 4.33 K/UL
NEUTROPHILS # BLD AUTO: 5.29 K/UL — SIGNIFICANT CHANGE UP (ref 1.8–7.4)
NEUTROPHILS NFR BLD AUTO: 5.2 %
NEUTROPHILS NFR BLD AUTO: 6 % — LOW (ref 43–77)
NITRITE UR-MCNC: NEGATIVE — SIGNIFICANT CHANGE UP
NONHDLC SERPL-MCNC: 120 MG/DL
NRBC # BLD: 0 /100 — SIGNIFICANT CHANGE UP (ref 0–0)
NT-PROBNP SERPL-SCNC: 10 PG/ML — SIGNIFICANT CHANGE UP (ref 0–300)
NT-PROBNP SERPL-SCNC: 7 PG/ML — SIGNIFICANT CHANGE UP (ref 0–300)
PH UR: 6 — SIGNIFICANT CHANGE UP (ref 5–8)
PLAT MORPH BLD: NORMAL — SIGNIFICANT CHANGE UP
PLATELET # BLD AUTO: 144 K/UL
PLATELET # BLD AUTO: 173 K/UL — SIGNIFICANT CHANGE UP (ref 150–400)
POTASSIUM SERPL-MCNC: 4.1 MMOL/L — SIGNIFICANT CHANGE UP (ref 3.5–5.3)
POTASSIUM SERPL-SCNC: 4.1 MMOL/L — SIGNIFICANT CHANGE UP (ref 3.5–5.3)
POTASSIUM SERPL-SCNC: 4.2 MMOL/L
PROT SERPL-MCNC: 6.1 G/DL
PROT SERPL-MCNC: 6.8 G/DL — SIGNIFICANT CHANGE UP (ref 6–8.3)
PROT UR-MCNC: SIGNIFICANT CHANGE UP
RBC # BLD: 4.58 M/UL
RBC # BLD: 4.9 M/UL — SIGNIFICANT CHANGE UP (ref 4.2–5.8)
RBC # FLD: 13.6 % — SIGNIFICANT CHANGE UP (ref 10.3–14.5)
RBC # FLD: 13.9 %
RBC BLD AUTO: SIGNIFICANT CHANGE UP
RH IG SCN BLD-IMP: POSITIVE — SIGNIFICANT CHANGE UP
SARS-COV-2 RNA SPEC QL NAA+PROBE: SIGNIFICANT CHANGE UP
SODIUM SERPL-SCNC: 139 MMOL/L — SIGNIFICANT CHANGE UP (ref 135–145)
SODIUM SERPL-SCNC: 142 MMOL/L
SP GR SPEC: 1.02 — SIGNIFICANT CHANGE UP (ref 1.01–1.02)
T3 SERPL-MCNC: 83 NG/DL — SIGNIFICANT CHANGE UP (ref 80–200)
T3 SERPL-MCNC: 86 NG/DL
T3FREE SERPL-MCNC: 2.23 PG/ML
T4 AB SER-ACNC: 10.6 UG/DL — SIGNIFICANT CHANGE UP (ref 4.6–12)
T4 FREE SERPL-MCNC: 1.5 NG/DL
T4 SERPL-MCNC: 9.4 UG/DL
TRIGL SERPL-MCNC: 108 MG/DL
TROPONIN T, HIGH SENSITIVITY RESULT: 8 NG/L — SIGNIFICANT CHANGE UP (ref 0–51)
TSH SERPL-ACNC: 4.98 UIU/ML
TSH SERPL-MCNC: 3.32 UIU/ML — SIGNIFICANT CHANGE UP (ref 0.27–4.2)
UROBILINOGEN FLD QL: NEGATIVE — SIGNIFICANT CHANGE UP
VIT B12 SERPL-MCNC: 1024 PG/ML
WBC # BLD: 88.09 K/UL — CRITICAL HIGH (ref 3.8–10.5)
WBC # FLD AUTO: 83.29 K/UL
WBC # FLD AUTO: 88.09 K/UL — CRITICAL HIGH (ref 3.8–10.5)

## 2021-10-05 PROCEDURE — 93306 TTE W/DOPPLER COMPLETE: CPT | Mod: PD

## 2021-10-05 PROCEDURE — 99222 1ST HOSP IP/OBS MODERATE 55: CPT

## 2021-10-05 PROCEDURE — 93000 ELECTROCARDIOGRAM COMPLETE: CPT | Mod: PD

## 2021-10-05 PROCEDURE — 99284 EMERGENCY DEPT VISIT MOD MDM: CPT | Mod: CS

## 2021-10-05 PROCEDURE — 99214 OFFICE O/P EST MOD 30 MIN: CPT

## 2021-10-05 PROCEDURE — 93306 TTE W/DOPPLER COMPLETE: CPT | Mod: 26,PD

## 2021-10-05 PROCEDURE — 71046 X-RAY EXAM CHEST 2 VIEWS: CPT | Mod: 26

## 2021-10-05 PROCEDURE — ZZZZZ: CPT | Mod: PD

## 2021-10-05 RX ORDER — INFLUENZA VIRUS VACCINE 15; 15; 15; 15 UG/.5ML; UG/.5ML; UG/.5ML; UG/.5ML
0.5 SUSPENSION INTRAMUSCULAR ONCE
Refills: 0 | Status: DISCONTINUED | OUTPATIENT
Start: 2021-10-05 | End: 2021-10-09

## 2021-10-05 RX ORDER — FINASTERIDE 5 MG/1
5 TABLET, FILM COATED ORAL DAILY
Refills: 0 | Status: DISCONTINUED | OUTPATIENT
Start: 2021-10-05 | End: 2021-10-09

## 2021-10-05 RX ORDER — SODIUM CHLORIDE 9 MG/ML
3 INJECTION INTRAMUSCULAR; INTRAVENOUS; SUBCUTANEOUS EVERY 8 HOURS
Refills: 0 | Status: DISCONTINUED | OUTPATIENT
Start: 2021-10-05 | End: 2021-10-09

## 2021-10-05 RX ORDER — TAMSULOSIN HYDROCHLORIDE 0.4 MG/1
0.4 CAPSULE ORAL AT BEDTIME
Refills: 0 | Status: DISCONTINUED | OUTPATIENT
Start: 2021-10-05 | End: 2021-10-09

## 2021-10-05 RX ADMIN — SODIUM CHLORIDE 3 MILLILITER(S): 9 INJECTION INTRAMUSCULAR; INTRAVENOUS; SUBCUTANEOUS at 22:08

## 2021-10-05 RX ADMIN — TAMSULOSIN HYDROCHLORIDE 0.4 MILLIGRAM(S): 0.4 CAPSULE ORAL at 22:14

## 2021-10-05 RX ADMIN — Medication 20 MILLIGRAM(S): at 22:41

## 2021-10-05 NOTE — DISCUSSION/SUMMARY
[Mitral Regurgitation] : mitral regurgitation [Stable] : stable [None] : There are no changes in medication management [Sodium Restriction] : sodium restriction [Patient] : the patient [FreeTextEntry1] : SOB- Echo without change LV fxn; but significant bilateral pleural effusions present. Pt. referred to [place] (son is PA) for further work-up and treatment of pleural effusions. \par Blood work reviewed with pt. Maintain a low caloric, low sodium, low cholesterol  diet. Dietary counseling given, diet and exercise discussed in detail, weight loss recommended.\par

## 2021-10-05 NOTE — CONSULT NOTE ADULT - ASSESSMENT
69 yo man with CLL on Imbruvica which was started 4 months ago. He appears to be responding to treatment since ALC has decreased per son report, and no nodes appreciated on exam today. He has a symptomatic R pleural effusion and questionable wall motion abnormality on outpatient echo.    Progression of disease can be a cause for his effusion. Effusions have also been described from ibrutininb (serositis), which might be a more likely explanation if he is indeed responding to treatment.    Suggest:   await Echo report  agree with CT chest  HOLD IMBRUVICA for now - if an invasive procedure, eg thoracentesis, is needed, he'll need to be off the med for 3 days  d/W CTS NPs and with son  will contact office of Dr. Liu to obtain more information regarding his CLL

## 2021-10-05 NOTE — H&P ADULT - PROBLEM SELECTOR PLAN 3
On Imbruvica 420mg daily as outpt for CLL last dose today 10/5 AM   Heme-Onc Dr. Vasquez consulted for evaluation appreciate rec's   - Would hold Imbruvica x 3 days prior to any invasive procedure/thoracentesis for pleural effusion    -CT Chest Non-Con to evaluation effusion     -Will obtain records from outpt Oncologist- Seen by Dr. Liu at [facility] [place]

## 2021-10-05 NOTE — ED ADULT NURSE NOTE - NSIMPLEMENTINTERV_GEN_ALL_ED
Implemented All Fall Risk Interventions:  [place] to call system. Call bell, personal items and telephone within reach. Instruct patient to call for assistance. Room bathroom lighting operational. Non-slip footwear when patient is off stretcher. Physically safe environment: no spills, clutter or unnecessary equipment. Stretcher in lowest position, wheels locked, appropriate side rails in place. Provide visual cue, wrist band, yellow gown, etc. Monitor gait and stability. Monitor for mental status changes and reorient to person, place, and time. Review medications for side effects contributing to fall risk. Reinforce activity limits and safety measures with patient and family.
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
## 2021-10-05 NOTE — HISTORY OF PRESENT ILLNESS
[FreeTextEntry1] : The patient c/o sob for the last 3 weeks described as mild in severity, worsening with exertion and relieved with rest. The patient denies associated chest pain, orthopnea, palpitations, leg edema, dizziness, diaphoresis, PND. The patient considers this a change in their clinical condition/status.\par Echo ordered to assess LV function/possible valvular heart disease.\par

## 2021-10-05 NOTE — ED PROVIDER NOTE - ATTENDING CONTRIBUTION TO CARE
RGUJRAL 71yo male hx listed sent in by PCP for B/L pleural effusion and admission. Pt complains of SOB and JOSHI for 3 weeks. Denies any chest pain, palp, cough, URI, f/c. Pt had outpt ECHO today with concerns for wall motion abnormality, no pericardial effusion.   On exam, Patient is awake, alert and oriented x 3.  Patient is well appearing and in no acute distress.  Neck is supple, No LAD.  Lungs are decreased BS on the right, +S1S2 no murmurs,  Abdomen:Soft nd/nt+bs no rebound or guarding.  Extremity no edema or calf tender.  Skin with no rash.  Neuro CN3-12 intact. Strength 5/5 in upper and lower extremities. Nml Sensation.  Check labs, Xray chest. DDx, reactive effusion vs chf. Spoke to admitting team, states will continue work up on admission.

## 2021-10-05 NOTE — CONSULT NOTE ADULT - SUBJECTIVE AND OBJECTIVE BOX
Chief Complaint:    HPI:  69 yo male with PMHx of CLL, diagnosed in 1999, and BPH, admitted with progressive JOSHI. Wall motion abnormality seen on outpatient echo along with large pleural effusion on right. No fevers, sweats, or cough.     He was diagnosed with CLL [id number] and was observed until 2013-4. At that time he was given pentostatin/cytoxan and developed PRCA (either from chemo or Bactrim) and this was changed to RCVPx4, with treatment finishing in 2014. For several years he was followed at [facility], and as of 2018 had a WBC 20-30 and did not need treatment. At some point thereafter he [place] [place] [facility] [place] was POD around June 2021, with adenopathy per patient and son. He was started on Imbruvica which he last took this morning. Dr Liu last saw him 2 weeks ago and was advised to continue the medication, with plan to followup later in the month.    ROS: positive for JOSHI    PAST MEDICAL & SURGICAL HISTORY:  CLL (chronic lymphocytic leukemia)    History of BPH    H/O excision of mass  R hip        SOCIAL HISTORY:  Smoking - Non smoker   Alcohol - Social  Drugs - No drug use    FAMILY HISTORY:      MEDICATIONS  (STANDING):  sodium chloride 0.9% lock flush 3 milliLiter(s) IV Push every 8 hours    MEDICATIONS  (PRN):      Vital Signs Last 24 Hrs  T(C): 37.1 (05 Oct 2021 19:54), Max: 37.1 (05 Oct 2021 16:48)  T(F): 98.8 (05 Oct 2021 19:54), Max: 98.8 (05 Oct 2021 19:54)  HR: 77 (05 Oct 2021 19:54) (75 - 81)  BP: 126/76 (05 Oct 2021 19:54) (126/76 - 138/83)  BP(mean): --  RR: 18 (05 Oct 2021 19:54) (18 - 26)  SpO2: 93% (05 Oct 2021 19:54) (93% - 97%)      PHYSICAL EXAM:      Constitutional: appears in NAD    Eyes: anicteric    ENMT:    Neck: no JVD    Lymph nodes: none palpable    Respiratory: decreased breath sounds R base 1/2 up    Cardiovascular: regular    Gastrointestinal: nontender, no HSM    Extremities: no edema    Skin:                    LABS:                          13.1   88.09 )-----------( 173      ( 05 Oct 2021 16:12 )             45.0         Mean Cell Volume : 91.8 fl  Mean Cell Hemoglobin : 26.7 pg  Mean Cell Hemoglobin Concentration : 29.1 gm/dL  Auto Neutrophil # : x  Auto Lymphocyte # : x  Auto Monocyte # : x  Auto Eosinophil # : x  Auto Basophil # : x  Auto Neutrophil % : x  Auto Lymphocyte % : x  Auto Monocyte % : x  Auto Eosinophil % : x  Auto Basophil % : x      Serial CBC's  10-05 @ 16:12  Hct-45.0 / Hgb-13.1 / Plat-173 / RBC-4.90 / WBC-88.09      10-05    139  |  100  |  15  ----------------------------<  88  4.1   |  25  |  1.01    Ca    9.5      05 Oct 2021 16:12    TPro  6.8  /  Alb  4.3  /  TBili  0.4  /  DBili  x   /  AST  19  /  ALT  9<L>  /  AlkPhos  77  10-05

## 2021-10-05 NOTE — H&P ADULT - NSHPREVIEWOFSYSTEMS_GEN_ALL_CORE
General:  no weakness, fatigue, fevers or chills  Skin: no itching, burning, rashes, or lesions   HEENT: no visual changes;  no headache, no vertigo, no recent colds, nasal stuffiness or sore throat   Neck: no pain, stiffness or swollen glands  Respiratory: +shortness of breath/dyspnea on exertion. No cough, sputum, wheezing, hemoptysis  Cardiovascular: no chest pain, dyspnea or palpitations  GI: no abdominal or epigastric pain. no heartburn, nausea, vomiting, or hematemesis; no diarrhea or constipation. no melena or hematochezia  : no dysuria, frequency or hematuria  Peripheral Vascular: no intermittent claudication, leg cramps, varicose veins, swelling or swelling with redness and tenderness  Musculoskeletal: +left sided rib pain Denies limitations of movement or paralysis,  Neuro: no changes in orientation, memory, insight or judgment, no changes in mood, attention or speech.  no dizziness, vertigo or fainting, numbness, tingling or weakness

## 2021-10-05 NOTE — ED ADULT NURSE REASSESSMENT NOTE - NS ED NURSE REASSESS COMMENT FT1
Pt is breathing unlabored on RA. Vital signs stable. Educated pt on plan of care. Safety and comfort maintained. Pt admitted to surgery, awaiting a bed.

## 2021-10-05 NOTE — H&P ADULT - NSHPLABSRESULTS_GEN_ALL_CORE
COVID-19 PCR . (10.05.21 @ 16:12)    COVID-19 PCR: NotDetec    CBC Full  -  ( 05 Oct 2021 16:12 )  WBC Count : 88.09 K/uL  RBC Count : 4.90 M/uL  Hemoglobin : 13.1 g/dL  Hematocrit : 45.0 %  Platelet Count - Automated : 173 K/uL  Mean Cell Volume : 91.8 fl  Mean Cell Hemoglobin : 26.7 pg  Mean Cell Hemoglobin Concentration : 29.1 gm/dL  Auto Neutrophil # : 5.29 K/uL  Auto Lymphocyte # : 81.92 K/uL  Auto Monocyte # : 0.88 K/uL  Auto Eosinophil # : 0.00 K/uL  Auto Basophil # : 0.00 K/uL  Auto Neutrophil % : 6.0 %  Auto Lymphocyte % : 93.0 %  Auto Monocyte % : 1.0 %  Auto Eosinophil % : 0.0 %  Auto Basophil % : 0.0 %    Comprehensive Metabolic Panel (10.05.21 @ 16:12)    Sodium, Serum: 139 mmol/L    Potassium, Serum: 4.1 mmol/L    Chloride, Serum: 100 mmol/L    Carbon Dioxide, Serum: 25 mmol/L    Anion Gap, Serum: 14 mmol/L    Blood Urea Nitrogen, Serum: 15 mg/dL    Creatinine, Serum: 1.01 mg/dL    Glucose, Serum: 88 mg/dL    Calcium, Total Serum: 9.5 mg/dL    Protein Total, Serum: 6.8 g/dL    Albumin, Serum: 4.3 g/dL    Bilirubin Total, Serum: 0.4 mg/dL    Alkaline Phosphatase, Serum: 77 U/L    Aspartate Aminotransferase (AST/SGOT): 19: Mild hemolysis results may be falsely elevated U/L    Alanine Aminotransferase (ALT/SGPT): 9 U/L    eGFR if Non : 74: Interpretative comment    EXAM:  XR CHEST PA LAT 2V                            PROCEDURE DATE:  10/05/2021      ******PRELIMINARY REPORT******    ******PRELIMINARY REPORT******          INTERPRETATION:  Moderate right pleural effusion.           LIVIER HANKS MD; Resident Radiology

## 2021-10-05 NOTE — ED ADULT NURSE NOTE - PRO INTERPRETER NEED 2
Presented with sharp CP, SOB. EKG with inf GABI, troponin 0.83-->0.81. Current bacterial strep throat. Admitted for pericarditis. TTE 55-60%, no valvular pathology, no pericardial effusion. TSH normal.   -cont Ibuprofen 600mg TID  -cont Colchicine 0.6 mg daily   -daily EKG  -Cardiac MRI today at 6pm.   -CTA-coronaries today  -PPI QAM English

## 2021-10-05 NOTE — H&P ADULT - ASSESSMENT
71 yo male with PMHx of CLL, BPH.  Presented to ED with complaints of shortness of breath.  Patient endorses that his SOB started about 3 weeks ago after hitting the left side of his chest on a chair.  States that a few days later pain then began to spread to right side of his chest.  His shortness of breath is worse with exertion and  talking.  Patient was seen by his PMD today and had TTE showing BL pleural effusions and ?wall motion abnormalities.  No hx of CAD or CHF.

## 2021-10-05 NOTE — H&P ADULT - NSHPPHYSICALEXAM_GEN_ALL_CORE
General: NAD  Neuro: A&Ox4, gait steady, speech clear, no focal deficits noted  Respiratory: Right sided breath sounds diminished at base.  Left side CTA, no wheeze, no rhonchi, no crackles noted  Cardiovascular: RRR, normal S1S2, no murmur noted  GI: Abd soft, NT/ND, +BSx4Q   Peripheral Vascular:  B/L LE neg edema, 2+ peripheral pulses, no clubbing, cyanosis, varicosities/PVD noted  Musculoskeletal: B/L UE and LE 5/5 strength   Psychiatric: Normal mood, normal affect observed  Skin: Normal exam to inspection and palpation. no bleeding, no hematoma.

## 2021-10-05 NOTE — ED ADULT NURSE NOTE - OBJECTIVE STATEMENT
Pt is a 70 Y A&Ox3 Pt is a 70 Y A&O x3 M with hx of BPH, anxiety presenting to ED via EMS with c/o worsening SOB x3 weeks. Pt sent by his cardiologist for admission. Per EMS, pt found to have bilateral pleural effusions at cardiologist's office. Pt denies chest pain, dizziness, N+V. Pt arrives to ED breathing unlabored on RA. Pt speaking in complete sentences. Skin is warm and dry. No diaphoresis noted. No edema noted to LE b/l. IV established. Safety and comfort maintained.

## 2021-10-05 NOTE — ED ADULT NURSE NOTE - BRAND OF COVID-19 VACCINATION
Pfizer dose 1 and 2 Acitretin Pregnancy And Lactation Text: This medication is Pregnancy Category X and should not be given to women who are pregnant or may become pregnant in the future. This medication is excreted in breast milk.

## 2021-10-05 NOTE — PHYSICAL EXAM
[Well Developed] : well developed [Well Nourished] : well nourished [No Acute Distress] : no acute distress [Normal Conjunctiva] : normal conjunctiva [Normal Venous Pressure] : normal venous pressure [Normal S1, S2] : normal S1, S2 [No Rub] : no rub [No Gallop] : no gallop [Murmur] : murmur [Good Air Entry] : good air entry [No Respiratory Distress] : no respiratory distress  [Soft] : abdomen soft [Non Tender] : non-tender [No Masses/organomegaly] : no masses/organomegaly [Normal Bowel Sounds] : normal bowel sounds [Normal Gait] : normal gait [No Edema] : no edema [No Cyanosis] : no cyanosis [No Clubbing] : no clubbing [No Varicosities] : no varicosities [No Rash] : no rash [No Skin Lesions] : no skin lesions [Moves all extremities] : moves all extremities [No Focal Deficits] : no focal deficits [Normal Speech] : normal speech [Alert and Oriented] : alert and oriented [Normal memory] : normal memory [de-identified] : 2/6 JENI-> Axilla [de-identified] : decrease BS at bases, otherwise clear

## 2021-10-05 NOTE — H&P ADULT - PROBLEM SELECTOR PLAN 1
10/5 ED with SOB  -Found to have Right sided pleural effusion on xray  -TTE done pending results  -CT Chest Non-Con   -Per Heme will hold Imbruvica x3days ideally for possible invasive procedure/thoracentesis

## 2021-10-05 NOTE — H&P ADULT - HISTORY OF PRESENT ILLNESS
69 yo male with PMHx of CLL, BPH p/w SOB.  Patient endorses that he has had worsening SOB that started about 3 weeks ago after hitting the left side of his chest on a chair.  States the pain then began to spread to right side of his chest. His shortness of breath is worse with exertion and  talking.  Patient was seen by his PMD today and had TTE showing BL pleural effusions and ?wall motion abnormalities.  No hx of CAD or CHF.

## 2021-10-05 NOTE — ED PROVIDER NOTE - PHYSICAL EXAMINATION
Gen: AAO x 3, NAD  Skin: No rashes or lesions  HEENT: NC/AT, PERRLA, EOMI, MMM  Resp: unlabored CTAB, slightly diminished at the right base  Cardiac: rrr s1s2, no murmurs, rubs or gallops  GI: ND, +BS, Soft, NT  Ext: no pedal edema, FROM in all extremities  Neuro: no focal deficits

## 2021-10-05 NOTE — ED PROVIDER NOTE - OBJECTIVE STATEMENT
69 yo male with PMHx of CLL, BPH p/w SOB.  Patient reports that he has had worsening SOB for the past 3-4 weeks.  SOB has been worse with exertion and with talking.  Denies associated fevers, CP, abd pain, NVD, dysuria.  Patient was seen by his PMD today and had TTE showing BL pleural effusions and ?wall motion abnormalities.  No hx of CAD or CHF.

## 2021-10-06 DIAGNOSIS — J90 PLEURAL EFFUSION, NOT ELSEWHERE CLASSIFIED: ICD-10-CM

## 2021-10-06 LAB
A1C WITH ESTIMATED AVERAGE GLUCOSE RESULT: 5.3 % — SIGNIFICANT CHANGE UP (ref 4–5.6)
ALBUMIN SERPL ELPH-MCNC: 4.1 G/DL — SIGNIFICANT CHANGE UP (ref 3.3–5)
ALP SERPL-CCNC: 70 U/L — SIGNIFICANT CHANGE UP (ref 40–120)
ALT FLD-CCNC: 8 U/L — LOW (ref 10–45)
ANION GAP SERPL CALC-SCNC: 12 MMOL/L — SIGNIFICANT CHANGE UP (ref 5–17)
APTT BLD: 33.3 SEC — SIGNIFICANT CHANGE UP (ref 27.5–35.5)
AST SERPL-CCNC: 11 U/L — SIGNIFICANT CHANGE UP (ref 10–40)
BASOPHILS # BLD AUTO: 0 K/UL — SIGNIFICANT CHANGE UP (ref 0–0.2)
BASOPHILS NFR BLD AUTO: 0 % — SIGNIFICANT CHANGE UP (ref 0–2)
BILIRUB SERPL-MCNC: 0.4 MG/DL — SIGNIFICANT CHANGE UP (ref 0.2–1.2)
BUN SERPL-MCNC: 22 MG/DL — SIGNIFICANT CHANGE UP (ref 7–23)
CALCIUM SERPL-MCNC: 9.2 MG/DL — SIGNIFICANT CHANGE UP (ref 8.4–10.5)
CHLORIDE SERPL-SCNC: 102 MMOL/L — SIGNIFICANT CHANGE UP (ref 96–108)
CO2 SERPL-SCNC: 25 MMOL/L — SIGNIFICANT CHANGE UP (ref 22–31)
COVID-19 SPIKE DOMAIN AB INTERP: NEGATIVE — SIGNIFICANT CHANGE UP
COVID-19 SPIKE DOMAIN ANTIBODY RESULT: 0.4 U/ML — SIGNIFICANT CHANGE UP
CREAT SERPL-MCNC: 1.06 MG/DL — SIGNIFICANT CHANGE UP (ref 0.5–1.3)
EOSINOPHIL # BLD AUTO: 0.65 K/UL — HIGH (ref 0–0.5)
EOSINOPHIL NFR BLD AUTO: 0.8 % — SIGNIFICANT CHANGE UP (ref 0–6)
ESTIMATED AVERAGE GLUCOSE: 105 MG/DL — SIGNIFICANT CHANGE UP (ref 68–114)
GLUCOSE SERPL-MCNC: 90 MG/DL — SIGNIFICANT CHANGE UP (ref 70–99)
HCT VFR BLD CALC: 42.6 % — SIGNIFICANT CHANGE UP (ref 39–50)
HGB BLD-MCNC: 12.6 G/DL — LOW (ref 13–17)
INR BLD: 1.19 RATIO — HIGH (ref 0.88–1.16)
LYMPHOCYTES # BLD AUTO: 77.07 K/UL — HIGH (ref 1–3.3)
LYMPHOCYTES # BLD AUTO: 95.2 % — HIGH (ref 13–44)
MCHC RBC-ENTMCNC: 27.3 PG — SIGNIFICANT CHANGE UP (ref 27–34)
MCHC RBC-ENTMCNC: 29.6 GM/DL — LOW (ref 32–36)
MCV RBC AUTO: 92.4 FL — SIGNIFICANT CHANGE UP (ref 80–100)
MONOCYTES # BLD AUTO: 1.3 K/UL — HIGH (ref 0–0.9)
MONOCYTES NFR BLD AUTO: 1.6 % — LOW (ref 2–14)
MRSA PCR RESULT.: SIGNIFICANT CHANGE UP
NEUTROPHILS # BLD AUTO: 1.94 K/UL — SIGNIFICANT CHANGE UP (ref 1.8–7.4)
NEUTROPHILS NFR BLD AUTO: 2.4 % — LOW (ref 43–77)
PLATELET # BLD AUTO: 152 K/UL — SIGNIFICANT CHANGE UP (ref 150–400)
POTASSIUM SERPL-MCNC: 4.1 MMOL/L — SIGNIFICANT CHANGE UP (ref 3.5–5.3)
POTASSIUM SERPL-SCNC: 4.1 MMOL/L — SIGNIFICANT CHANGE UP (ref 3.5–5.3)
PREALB SERPL-MCNC: 24 MG/DL — SIGNIFICANT CHANGE UP (ref 20–40)
PROT SERPL-MCNC: 6.2 G/DL — SIGNIFICANT CHANGE UP (ref 6–8.3)
PROTHROM AB SERPL-ACNC: 14.2 SEC — HIGH (ref 10.6–13.6)
RBC # BLD: 4.61 M/UL — SIGNIFICANT CHANGE UP (ref 4.2–5.8)
RBC # FLD: 13.9 % — SIGNIFICANT CHANGE UP (ref 10.3–14.5)
S AUREUS DNA NOSE QL NAA+PROBE: SIGNIFICANT CHANGE UP
SARS-COV-2 IGG+IGM SERPL QL IA: 0.4 U/ML — SIGNIFICANT CHANGE UP
SARS-COV-2 IGG+IGM SERPL QL IA: NEGATIVE — SIGNIFICANT CHANGE UP
SODIUM SERPL-SCNC: 139 MMOL/L — SIGNIFICANT CHANGE UP (ref 135–145)
T4 FREE SERPL-MCNC: 1.7 NG/DL — SIGNIFICANT CHANGE UP (ref 0.9–1.8)
TSH SERPL-MCNC: 4.28 UIU/ML — HIGH (ref 0.27–4.2)
WBC # BLD: 80.96 K/UL — CRITICAL HIGH (ref 3.8–10.5)
WBC # FLD AUTO: 80.96 K/UL — CRITICAL HIGH (ref 3.8–10.5)

## 2021-10-06 PROCEDURE — 71250 CT THORAX DX C-: CPT | Mod: 26

## 2021-10-06 PROCEDURE — 93010 ELECTROCARDIOGRAM REPORT: CPT

## 2021-10-06 PROCEDURE — 94010 BREATHING CAPACITY TEST: CPT | Mod: 26

## 2021-10-06 RX ORDER — ENOXAPARIN SODIUM 100 MG/ML
40 INJECTION SUBCUTANEOUS DAILY
Refills: 0 | Status: DISCONTINUED | OUTPATIENT
Start: 2021-10-06 | End: 2021-10-09

## 2021-10-06 RX ADMIN — SODIUM CHLORIDE 3 MILLILITER(S): 9 INJECTION INTRAMUSCULAR; INTRAVENOUS; SUBCUTANEOUS at 13:26

## 2021-10-06 RX ADMIN — FINASTERIDE 5 MILLIGRAM(S): 5 TABLET, FILM COATED ORAL at 13:12

## 2021-10-06 RX ADMIN — SODIUM CHLORIDE 3 MILLILITER(S): 9 INJECTION INTRAMUSCULAR; INTRAVENOUS; SUBCUTANEOUS at 05:01

## 2021-10-06 RX ADMIN — SODIUM CHLORIDE 3 MILLILITER(S): 9 INJECTION INTRAMUSCULAR; INTRAVENOUS; SUBCUTANEOUS at 22:03

## 2021-10-06 RX ADMIN — TAMSULOSIN HYDROCHLORIDE 0.4 MILLIGRAM(S): 0.4 CAPSULE ORAL at 21:10

## 2021-10-06 RX ADMIN — ENOXAPARIN SODIUM 40 MILLIGRAM(S): 100 INJECTION SUBCUTANEOUS at 13:12

## 2021-10-06 RX ADMIN — Medication 20 MILLIGRAM(S): at 21:10

## 2021-10-07 LAB
ANION GAP SERPL CALC-SCNC: 12 MMOL/L — SIGNIFICANT CHANGE UP (ref 5–17)
BUN SERPL-MCNC: 22 MG/DL — SIGNIFICANT CHANGE UP (ref 7–23)
CALCIUM SERPL-MCNC: 9.1 MG/DL — SIGNIFICANT CHANGE UP (ref 8.4–10.5)
CHLORIDE SERPL-SCNC: 103 MMOL/L — SIGNIFICANT CHANGE UP (ref 96–108)
CO2 SERPL-SCNC: 25 MMOL/L — SIGNIFICANT CHANGE UP (ref 22–31)
CREAT SERPL-MCNC: 0.95 MG/DL — SIGNIFICANT CHANGE UP (ref 0.5–1.3)
GLUCOSE SERPL-MCNC: 108 MG/DL — HIGH (ref 70–99)
HCT VFR BLD CALC: 41.5 % — SIGNIFICANT CHANGE UP (ref 39–50)
HGB BLD-MCNC: 12.8 G/DL — LOW (ref 13–17)
MCHC RBC-ENTMCNC: 27.9 PG — SIGNIFICANT CHANGE UP (ref 27–34)
MCHC RBC-ENTMCNC: 30.8 GM/DL — LOW (ref 32–36)
MCV RBC AUTO: 90.6 FL — SIGNIFICANT CHANGE UP (ref 80–100)
NRBC # BLD: 0 /100 WBCS — SIGNIFICANT CHANGE UP (ref 0–0)
PLATELET # BLD AUTO: 129 K/UL — LOW (ref 150–400)
POTASSIUM SERPL-MCNC: 4.2 MMOL/L — SIGNIFICANT CHANGE UP (ref 3.5–5.3)
POTASSIUM SERPL-SCNC: 4.2 MMOL/L — SIGNIFICANT CHANGE UP (ref 3.5–5.3)
RBC # BLD: 4.58 M/UL — SIGNIFICANT CHANGE UP (ref 4.2–5.8)
RBC # FLD: 13.5 % — SIGNIFICANT CHANGE UP (ref 10.3–14.5)
SODIUM SERPL-SCNC: 140 MMOL/L — SIGNIFICANT CHANGE UP (ref 135–145)
WBC # BLD: 42.77 K/UL — CRITICAL HIGH (ref 3.8–10.5)
WBC # FLD AUTO: 42.77 K/UL — CRITICAL HIGH (ref 3.8–10.5)

## 2021-10-07 RX ADMIN — FINASTERIDE 5 MILLIGRAM(S): 5 TABLET, FILM COATED ORAL at 11:10

## 2021-10-07 RX ADMIN — SODIUM CHLORIDE 3 MILLILITER(S): 9 INJECTION INTRAMUSCULAR; INTRAVENOUS; SUBCUTANEOUS at 21:05

## 2021-10-07 RX ADMIN — SODIUM CHLORIDE 3 MILLILITER(S): 9 INJECTION INTRAMUSCULAR; INTRAVENOUS; SUBCUTANEOUS at 11:13

## 2021-10-07 RX ADMIN — ENOXAPARIN SODIUM 40 MILLIGRAM(S): 100 INJECTION SUBCUTANEOUS at 11:10

## 2021-10-07 RX ADMIN — SODIUM CHLORIDE 3 MILLILITER(S): 9 INJECTION INTRAMUSCULAR; INTRAVENOUS; SUBCUTANEOUS at 05:03

## 2021-10-07 RX ADMIN — Medication 20 MILLIGRAM(S): at 21:00

## 2021-10-07 RX ADMIN — TAMSULOSIN HYDROCHLORIDE 0.4 MILLIGRAM(S): 0.4 CAPSULE ORAL at 21:00

## 2021-10-08 ENCOUNTER — RESULT REVIEW (OUTPATIENT)
Age: 71
End: 2021-10-08

## 2021-10-08 LAB
ANION GAP SERPL CALC-SCNC: 9 MMOL/L — SIGNIFICANT CHANGE UP (ref 5–17)
B PERT IGG+IGM PNL SER: ABNORMAL
BUN SERPL-MCNC: 22 MG/DL — SIGNIFICANT CHANGE UP (ref 7–23)
CALCIUM SERPL-MCNC: 9.5 MG/DL — SIGNIFICANT CHANGE UP (ref 8.4–10.5)
CHLORIDE SERPL-SCNC: 103 MMOL/L — SIGNIFICANT CHANGE UP (ref 96–108)
CO2 SERPL-SCNC: 29 MMOL/L — SIGNIFICANT CHANGE UP (ref 22–31)
COLOR FLD: SIGNIFICANT CHANGE UP
CREAT SERPL-MCNC: 0.99 MG/DL — SIGNIFICANT CHANGE UP (ref 0.5–1.3)
EOSINOPHIL # FLD: 19 % — SIGNIFICANT CHANGE UP
FLUID INTAKE SUBSTANCE CLASS: SIGNIFICANT CHANGE UP
FLUID SEGMENTED GRANULOCYTES: 3 % — SIGNIFICANT CHANGE UP
FOLATE+VIT B12 SERBLD-IMP: 3 % — SIGNIFICANT CHANGE UP
GLUCOSE SERPL-MCNC: 101 MG/DL — HIGH (ref 70–99)
HCT VFR BLD CALC: 41.8 % — SIGNIFICANT CHANGE UP (ref 39–50)
HGB BLD-MCNC: 12.8 G/DL — LOW (ref 13–17)
LDH SERPL L TO P-CCNC: 520 U/L — SIGNIFICANT CHANGE UP
LYMPHOCYTES # FLD: 65 % — SIGNIFICANT CHANGE UP
MCHC RBC-ENTMCNC: 27.9 PG — SIGNIFICANT CHANGE UP (ref 27–34)
MCHC RBC-ENTMCNC: 30.6 GM/DL — LOW (ref 32–36)
MCV RBC AUTO: 91.1 FL — SIGNIFICANT CHANGE UP (ref 80–100)
MESOTHL CELL # FLD: 1 % — SIGNIFICANT CHANGE UP
MONOS+MACROS # FLD: 9 % — SIGNIFICANT CHANGE UP
NRBC # BLD: 0 /100 WBCS — SIGNIFICANT CHANGE UP (ref 0–0)
PLATELET # BLD AUTO: 137 K/UL — LOW (ref 150–400)
POTASSIUM SERPL-MCNC: 4.1 MMOL/L — SIGNIFICANT CHANGE UP (ref 3.5–5.3)
POTASSIUM SERPL-SCNC: 4.1 MMOL/L — SIGNIFICANT CHANGE UP (ref 3.5–5.3)
PROT FLD-MCNC: 4.8 G/DL — SIGNIFICANT CHANGE UP
RBC # BLD: 4.59 M/UL — SIGNIFICANT CHANGE UP (ref 4.2–5.8)
RBC # FLD: 13.6 % — SIGNIFICANT CHANGE UP (ref 10.3–14.5)
RCV VOL RI: HIGH /UL (ref 0–0)
SODIUM SERPL-SCNC: 141 MMOL/L — SIGNIFICANT CHANGE UP (ref 135–145)
SPECIMEN SOURCE FLD: SIGNIFICANT CHANGE UP
SPECIMEN SOURCE FLD: SIGNIFICANT CHANGE UP
TOTAL NUCLEATED CELL COUNT, BODY FLUID: 1133 /UL — SIGNIFICANT CHANGE UP
TUBE TYPE: SIGNIFICANT CHANGE UP
WBC # BLD: 49.48 K/UL — CRITICAL HIGH (ref 3.8–10.5)
WBC # FLD AUTO: 49.48 K/UL — CRITICAL HIGH (ref 3.8–10.5)

## 2021-10-08 PROCEDURE — 88112 CYTOPATH CELL ENHANCE TECH: CPT | Mod: 26

## 2021-10-08 PROCEDURE — 71045 X-RAY EXAM CHEST 1 VIEW: CPT | Mod: 26

## 2021-10-08 PROCEDURE — 32556 INSERT CATH PLEURA W/O IMAGE: CPT | Mod: RT

## 2021-10-08 PROCEDURE — 88305 TISSUE EXAM BY PATHOLOGIST: CPT | Mod: 26

## 2021-10-08 PROCEDURE — 88189 FLOWCYTOMETRY/READ 16 & >: CPT

## 2021-10-08 PROCEDURE — 99231 SBSQ HOSP IP/OBS SF/LOW 25: CPT | Mod: 25

## 2021-10-08 RX ORDER — ACETAMINOPHEN 500 MG
1000 TABLET ORAL ONCE
Refills: 0 | Status: COMPLETED | OUTPATIENT
Start: 2021-10-08 | End: 2021-10-08

## 2021-10-08 RX ORDER — FUROSEMIDE 40 MG
20 TABLET ORAL DAILY
Refills: 0 | Status: DISCONTINUED | OUTPATIENT
Start: 2021-10-08 | End: 2021-10-09

## 2021-10-08 RX ORDER — KETOROLAC TROMETHAMINE 30 MG/ML
30 SYRINGE (ML) INJECTION ONCE
Refills: 0 | Status: DISCONTINUED | OUTPATIENT
Start: 2021-10-08 | End: 2021-10-08

## 2021-10-08 RX ORDER — HYDROMORPHONE HYDROCHLORIDE 2 MG/ML
0.25 INJECTION INTRAMUSCULAR; INTRAVENOUS; SUBCUTANEOUS ONCE
Refills: 0 | Status: DISCONTINUED | OUTPATIENT
Start: 2021-10-08 | End: 2021-10-08

## 2021-10-08 RX ADMIN — Medication 400 MILLIGRAM(S): at 13:28

## 2021-10-08 RX ADMIN — HYDROMORPHONE HYDROCHLORIDE 0.25 MILLIGRAM(S): 2 INJECTION INTRAMUSCULAR; INTRAVENOUS; SUBCUTANEOUS at 14:27

## 2021-10-08 RX ADMIN — HYDROMORPHONE HYDROCHLORIDE 0.25 MILLIGRAM(S): 2 INJECTION INTRAMUSCULAR; INTRAVENOUS; SUBCUTANEOUS at 13:37

## 2021-10-08 RX ADMIN — TAMSULOSIN HYDROCHLORIDE 0.4 MILLIGRAM(S): 0.4 CAPSULE ORAL at 21:16

## 2021-10-08 RX ADMIN — SODIUM CHLORIDE 3 MILLILITER(S): 9 INJECTION INTRAMUSCULAR; INTRAVENOUS; SUBCUTANEOUS at 12:39

## 2021-10-08 RX ADMIN — SODIUM CHLORIDE 3 MILLILITER(S): 9 INJECTION INTRAMUSCULAR; INTRAVENOUS; SUBCUTANEOUS at 05:39

## 2021-10-08 RX ADMIN — SODIUM CHLORIDE 3 MILLILITER(S): 9 INJECTION INTRAMUSCULAR; INTRAVENOUS; SUBCUTANEOUS at 21:14

## 2021-10-08 RX ADMIN — Medication 20 MILLIGRAM(S): at 16:45

## 2021-10-08 RX ADMIN — ENOXAPARIN SODIUM 40 MILLIGRAM(S): 100 INJECTION SUBCUTANEOUS at 12:40

## 2021-10-08 RX ADMIN — Medication 1000 MILLIGRAM(S): at 14:27

## 2021-10-08 RX ADMIN — Medication 20 MILLIGRAM(S): at 21:16

## 2021-10-08 RX ADMIN — FINASTERIDE 5 MILLIGRAM(S): 5 TABLET, FILM COATED ORAL at 12:41

## 2021-10-08 RX ADMIN — Medication 30 MILLIGRAM(S): at 22:00

## 2021-10-08 RX ADMIN — Medication 30 MILLIGRAM(S): at 21:22

## 2021-10-08 NOTE — PROGRESS NOTE ADULT - PROBLEM SELECTOR PLAN 2
Continue proscar and flomax
Continue Proscar 5 mg PO daily and Flomax 0.4 mg PO daily
Continue proscar and flomax

## 2021-10-08 NOTE — PROGRESS NOTE ADULT - PROBLEM SELECTOR PLAN 1
CT chest done  TTE done  Imbruvica on hold   Plan for thoracentesis Friday  Monitor on telemetry  monitor O2 sat  DVT prophylaxis with lovenox 40 QD
Imbruvica on hold   Plan for thoracentesis today --> Right pigtail cath placed follow up CXR  CXR in AM   Cytology / Flow Cytometry / Cell count / LDH and Total protein sent   Monitor on telemetry  monitor O2 sat  DVT prophylaxis with Lovenox 40 QD  Started on Lasix 20 mg PO daily
CT chest done  TTE done  Imbruvica on hold   Plan for thoracentesis Friday  Monitor on telemetry  monitor O2 sat  DVT prophylaxis with lovenox 40 QD

## 2021-10-08 NOTE — PROCEDURE NOTE - NSPROCDETAILS_GEN_ALL_CORE
secured in place/sterile dressing applied/thoracostomy tube placed percutaneously/ultrasound assessment of fluid (location)

## 2021-10-08 NOTE — PROGRESS NOTE ADULT - PROBLEM SELECTOR PLAN 3
On Imbruvica x 4 months currently on hold for thoracentesis friday  will discuss with hematology if possible cause and if to resume after invasive procedure  Hematologist Dr Vasquez to reach out to Dr Liu ( out pt hematologist )
On Imbruvica x 4 months currently on hold for thoracentesis friday  will discuss with hematology if possible cause and if to resume after invasive procedure  Hematologist Dr. Vasquez to reach out to Dr Liu (out pt hematologist )  Cytology / Flow Cytometry / Cell count / LDH and Total protein sent
On Imbruvica x 4 months currently on hold for thoracentesis friday  will discuss with hematology if possible cause and if to resume after invasive procedure  Hematologist Dr Vasquez to reach out to Dr Liu ( out pt hematologist )

## 2021-10-09 ENCOUNTER — TRANSCRIPTION ENCOUNTER (OUTPATIENT)
Age: 71
End: 2021-10-09

## 2021-10-09 VITALS
OXYGEN SATURATION: 97 % | RESPIRATION RATE: 16 BRPM | HEART RATE: 65 BPM | DIASTOLIC BLOOD PRESSURE: 72 MMHG | TEMPERATURE: 98 F | SYSTOLIC BLOOD PRESSURE: 115 MMHG

## 2021-10-09 LAB
ANION GAP SERPL CALC-SCNC: 11 MMOL/L — SIGNIFICANT CHANGE UP (ref 5–17)
BUN SERPL-MCNC: 21 MG/DL — SIGNIFICANT CHANGE UP (ref 7–23)
CALCIUM SERPL-MCNC: 9.3 MG/DL — SIGNIFICANT CHANGE UP (ref 8.4–10.5)
CHLORIDE SERPL-SCNC: 103 MMOL/L — SIGNIFICANT CHANGE UP (ref 96–108)
CO2 SERPL-SCNC: 23 MMOL/L — SIGNIFICANT CHANGE UP (ref 22–31)
CREAT SERPL-MCNC: 0.99 MG/DL — SIGNIFICANT CHANGE UP (ref 0.5–1.3)
FIBRINOGEN PPP-MCNC: 778 MG/DL — HIGH (ref 290–520)
GLUCOSE SERPL-MCNC: 107 MG/DL — HIGH (ref 70–99)
HAPTOGLOB SERPL-MCNC: 222 MG/DL — HIGH (ref 34–200)
HCT VFR BLD CALC: 41.3 % — SIGNIFICANT CHANGE UP (ref 39–50)
HGB BLD-MCNC: 12.5 G/DL — LOW (ref 13–17)
LDH SERPL L TO P-CCNC: 294 U/L — HIGH (ref 50–242)
MCHC RBC-ENTMCNC: 27.7 PG — SIGNIFICANT CHANGE UP (ref 27–34)
MCHC RBC-ENTMCNC: 30.3 GM/DL — LOW (ref 32–36)
MCV RBC AUTO: 91.4 FL — SIGNIFICANT CHANGE UP (ref 80–100)
NRBC # BLD: 0 /100 WBCS — SIGNIFICANT CHANGE UP (ref 0–0)
PLATELET # BLD AUTO: 118 K/UL — LOW (ref 150–400)
POTASSIUM SERPL-MCNC: 4.6 MMOL/L — SIGNIFICANT CHANGE UP (ref 3.5–5.3)
POTASSIUM SERPL-SCNC: 4.6 MMOL/L — SIGNIFICANT CHANGE UP (ref 3.5–5.3)
RBC # BLD: 4.52 M/UL — SIGNIFICANT CHANGE UP (ref 4.2–5.8)
RBC # FLD: 13.6 % — SIGNIFICANT CHANGE UP (ref 10.3–14.5)
SODIUM SERPL-SCNC: 137 MMOL/L — SIGNIFICANT CHANGE UP (ref 135–145)
WBC # BLD: 34.56 K/UL — HIGH (ref 3.8–10.5)
WBC # FLD AUTO: 34.56 K/UL — HIGH (ref 3.8–10.5)

## 2021-10-09 PROCEDURE — 71045 X-RAY EXAM CHEST 1 VIEW: CPT | Mod: 26,76

## 2021-10-09 PROCEDURE — 99238 HOSP IP/OBS DSCHRG MGMT 30/<: CPT

## 2021-10-09 RX ORDER — IBRUTINIB 140 MG/1
1 TABLET, FILM COATED ORAL
Qty: 0 | Refills: 0 | DISCHARGE

## 2021-10-09 RX ORDER — FUROSEMIDE 40 MG
1 TABLET ORAL
Qty: 14 | Refills: 0
Start: 2021-10-09 | End: 2021-10-22

## 2021-10-09 RX ORDER — BNT162B2 0.23 MG/2.25ML
0.3 INJECTION, SUSPENSION INTRAMUSCULAR ONCE
Refills: 0 | Status: COMPLETED | OUTPATIENT
Start: 2021-10-09 | End: 2021-10-09

## 2021-10-09 RX ORDER — FUROSEMIDE 40 MG
1 TABLET ORAL
Qty: 7 | Refills: 0
Start: 2021-10-09 | End: 2021-10-15

## 2021-10-09 RX ORDER — KETOROLAC TROMETHAMINE 30 MG/ML
30 SYRINGE (ML) INJECTION ONCE
Refills: 0 | Status: DISCONTINUED | OUTPATIENT
Start: 2021-10-09 | End: 2021-10-09

## 2021-10-09 RX ADMIN — Medication 20 MILLIGRAM(S): at 06:07

## 2021-10-09 RX ADMIN — Medication 30 MILLIGRAM(S): at 10:45

## 2021-10-09 RX ADMIN — BNT162B2 0.3 MILLILITER(S): 0.23 INJECTION, SUSPENSION INTRAMUSCULAR at 14:21

## 2021-10-09 RX ADMIN — FINASTERIDE 5 MILLIGRAM(S): 5 TABLET, FILM COATED ORAL at 11:16

## 2021-10-09 RX ADMIN — SODIUM CHLORIDE 3 MILLILITER(S): 9 INJECTION INTRAMUSCULAR; INTRAVENOUS; SUBCUTANEOUS at 06:00

## 2021-10-09 RX ADMIN — Medication 30 MILLIGRAM(S): at 11:00

## 2021-10-09 RX ADMIN — SODIUM CHLORIDE 3 MILLILITER(S): 9 INJECTION INTRAMUSCULAR; INTRAVENOUS; SUBCUTANEOUS at 13:27

## 2021-10-09 NOTE — DISCHARGE NOTE NURSING/CASE MANAGEMENT/SOCIAL WORK - NSSCNAMETXT_GEN_ALL_CORE
10/6 HD stable CT chest done right effusion.  TTE done. Imbruvica on hold. Thoracentesis for Friday am.    10/7 HD stable. Imbruvica on hold. Thoracentesis in am.   10/8 VVS; Right Pigtail cath placed yielding 2 liters --> Pleural vac --> LWS.  Pleural fluid cytology sent. Heme/ Onc following.  Pain management.  Started on Lasix 20 mg PO daily.    [facility]

## 2021-10-09 NOTE — PROGRESS NOTE ADULT - SUBJECTIVE AND OBJECTIVE BOX
S:  feels ok.  sob unchanged    Telemetry:  SR 70's    Vital Signs Last 24 Hrs  T(C): 36.8 (10-07-21 @ 10:58), Max: 36.9 (10-06-21 @ 19:05)  T(F): 98.3 (10-07-21 @ 10:58), Max: 98.4 (10-06-21 @ 19:05)  HR: 84 (10-07-21 @ 10:58) (80 - 104)  BP: 104/67 (10-07-21 @ 10:58) (104/67 - 134/91)  RR: 18 (10-07-21 @ 10:58) (18 - 18)  SpO2: 95% (10-07-21 @ 10:58) (95% - 96%)                   10-06 @ 07:01  -  10-07 @ 07:00  --------------------------------------------------------  IN: 740 mL / OUT: 1500 mL / NET: -760 mL    10-07 @ 07:01  -  10-07 @ 12:08  --------------------------------------------------------  IN: 240 mL / OUT: 0 mL / NET: 240 mL          Daily     Daily Weight in k (07 Oct 2021 10:30)        CAPILLARY BLOOD GLUCOSE              Drains:     MS         [  ] Drainage:                 L Pleural  [  ]  Drainage:                R Pleural  [  ]  Drainage:    Pacing Wires        [  ]   Settings:                                  Isolated  [  ]    Coumadin    [ ] YES          [ x ]      NO         Reason:                                 12.8   42.77 )-----------( 129      ( 07 Oct 2021 05:29 )             41.5       10-07    140  |  103  |  22  ----------------------------<  108<H>  4.2   |  25  |  0.95    Ca    9.1      07 Oct 2021 05:28    TPro  6.2  /  Alb  4.1  /  TBili  0.4  /  DBili  x   /  AST  11  /  ALT  8<L>  /  AlkPhos  70  10-06      PT/INR - ( 05 Oct 2021 22:51 )   PT: 14.2 sec;   INR: 1.19 ratio         PTT - ( 05 Oct 2021 22:51 )  PTT:33.3 sec    PHYSICAL EXAM:    Neurology: alert and oriented x 3, nonfocal, no gross deficits    CV : S1S2 heard RRR    Lungs:  clear to ausc.  no w/r/r  diminished breath sounds right base    Abdomen: soft, nontender, nondistended, positive bowel sounds            Extremities:    no edema           enoxaparin Injectable 40 milliGRAM(s) SubCutaneous daily  finasteride 5 milliGRAM(s) Oral daily  influenza   Vaccine 0.5 milliLiter(s) IntraMuscular once  PARoxetine 20 milliGRAM(s) Oral at bedtime  sodium chloride 0.9% lock flush 3 milliLiter(s) IV Push every 8 hours  tamsulosin 0.4 milliGRAM(s) Oral at bedtime                                  Discussed with Cardiothoracic Team at AM rounds.
patient seen in coverage for Dr. Vasquez   Patient is a 71y old  Male who presents with a chief complaint of Shortness of Breath (07 Oct 2021 12:07)    some discomfort in chest from chest tube. Breathing okay. Occasional cough without hemoptysis.  Appetite is good and had a BM today. Denies N/V/D, and abdominal pain. No HA. No fevers.    Medication:   enoxaparin Injectable 40 milliGRAM(s) SubCutaneous daily  finasteride 5 milliGRAM(s) Oral daily  influenza   Vaccine 0.5 milliLiter(s) IntraMuscular once  PARoxetine 20 milliGRAM(s) Oral at bedtime  sodium chloride 0.9% lock flush 3 milliLiter(s) IV Push every 8 hours  tamsulosin 0.4 milliGRAM(s) Oral at bedtime      Physical exam    T(C): 36.7 (10-08-21 @ 12:33), Max: 37.2 (10-07-21 @ 19:24)  HR: 79 (10-08-21 @ 12:33) (71 - 91)  BP: 119/74 (10-08-21 @ 12:33) (110/72 - 119/74)  RR: 18 (10-08-21 @ 12:33) (18 - 18)  SpO2: 94% (10-08-21 @ 12:33) (91% - 94%)  Wt(kg): --    alert NAD  EOMI anicteric sclera  Neck No LNA  Cv s1 S2 RRR  Lungs clear B/L anteriorly  abd soft NT ND +BS  No LE edema or tenderness    Labs                              12.8   49.48 )-----------( 137      ( 08 Oct 2021 05:49 )             41.8       10-08    141  |  103  |  22  ----------------------------<  101<H>  4.1   |  29  |  0.99    Ca    9.5      08 Oct 2021 05:48            [id number]
    HPI:  CLL with response to Imbruvica  symptomatic right pleural effusion  Imbruvica held since admission  s/p thoracentesis yesterday, 2200 mL bloody fluid drained, symptomatic relief  breathing much better      ROS:  Negative, all systems reviewed    MEDICATIONS  (STANDING):  coronavirus Vaccine (PFIZER) 0.3 milliLiter(s) IntraMuscular once  finasteride 5 milliGRAM(s) Oral daily  furosemide    Tablet 20 milliGRAM(s) Oral daily  influenza   Vaccine 0.5 milliLiter(s) IntraMuscular once  PARoxetine 20 milliGRAM(s) Oral at bedtime  sodium chloride 0.9% lock flush 3 milliLiter(s) IV Push every 8 hours  tamsulosin 0.4 milliGRAM(s) Oral at bedtime    MEDICATIONS  (PRN):      Allergies    Bactrim (Unknown)    Intolerances        Vital Signs Last 24 Hrs  T(C): 36.7 (09 Oct 2021 04:11), Max: 36.7 (08 Oct 2021 20:35)  T(F): 98.1 (09 Oct 2021 04:11), Max: 98.1 (09 Oct 2021 04:11)  HR: 67 (09 Oct 2021 04:11) (67 - 79)  BP: 123/78 (09 Oct 2021 04:11) (122/70 - 123/78)  BP(mean): --  RR: 18 (09 Oct 2021 04:11) (18 - 18)  SpO2: 96% (09 Oct 2021 04:11) (93% - 96%)    PHYSICAL EXAM:      Constitutional: appears well    Eyes: anicteric    ENMT:    Neck:    Lymph nodes: none    Respiratory: rales right base, but overall improved aeration    Cardiovascular: RRR    Gastrointestinal: nontender    Extremities: no edema    Skin:                    LABS:                          12.5   34.56 )-----------( 118      ( 09 Oct 2021 05:33 )             41.3         Mean Cell Volume : 91.4 fl  Mean Cell Hemoglobin : 27.7 pg  Mean Cell Hemoglobin Concentration : 30.3 gm/dL  Auto Neutrophil # : x  Auto Lymphocyte # : x  Auto Monocyte # : x  Auto Eosinophil # : x  Auto Basophil # : x  Auto Neutrophil % : x  Auto Lymphocyte % : x  Auto Monocyte % : x  Auto Eosinophil % : x  Auto Basophil % : x    Serial CBC  Hematocrit 41.3  Hemoglobin 12.5  Plat 118  RBC 4.52  WBC 34.56  Serial CBC  Hematocrit 41.8  Hemoglobin 12.8  Plat 137  RBC 4.59  WBC 49.48  Serial CBC  Hematocrit 41.5  Hemoglobin 12.8  Plat 129  RBC 4.58  WBC 42.77  Serial CBC  Hematocrit 42.6  Hemoglobin 12.6  Plat 152  RBC 4.61  WBC 80.96  Serial CBC  Hematocrit 45.0  Hemoglobin 13.1  Plat 173  RBC 4.90  WBC 88.09    10-09    137  |  103  |  21  ----------------------------<  107<H>  4.6   |  23  |  0.99    Ca    9.3      09 Oct 2021 05:33                
S:  feels less sob than yesterday.  denies chest pain    Telemetry:  SR 60-80    Vital Signs Last 24 Hrs  T(C): 36.7 (10-06-21 @ 13:02), Max: 37.1 (10-05-21 @ 16:48)  T(F): 98 (10-06-21 @ 13:02), Max: 98.8 (10-05-21 @ 19:54)  HR: 81 (10-06-21 @ 13:02) (64 - 81)  BP: 105/67 (10-06-21 @ 13:02) (105/65 - 128/79)  RR: 18 (10-06-21 @ 13:02) (17 - 19)  SpO2: 95% (10-06-21 @ 13:02) (93% - 96%)                   10-05 @ 07:01  -  10-06 @ 07:00  --------------------------------------------------------  IN: 200 mL / OUT: 450 mL / NET: -250 mL    10-06 @ 07:01  -  10-06 @ 16:04  --------------------------------------------------------  IN: 500 mL / OUT: 800 mL / NET: -300 mL          Daily     Daily Weight in k.8 (06 Oct 2021 07:50)              Drains:     MS         [  ] Drainage:                 L Pleural  [  ]  Drainage:                R Pleural  [  ]  Drainage:    Pacing Wires        [  ]   Settings:                                  Isolated  [  ]    Coumadin    [ ] YES          [ x ]      NO         Reason:                                 12.6   80.96 )-----------( 152      ( 06 Oct 2021 06:30 )             42.6       10-06    139  |  102  |  22  ----------------------------<  90  4.1   |  25  |  1.06    Ca    9.2      06 Oct 2021 06:30    TPro  6.2  /  Alb  4.1  /  TBili  0.4  /  DBili  x   /  AST  11  /  ALT  8<L>  /  AlkPhos  70  10-06      PT/INR - ( 05 Oct 2021 22:51 )   PT: 14.2 sec;   INR: 1.19 ratio         PTT - ( 05 Oct 2021 22:51 )  PTT:33.3 sec    PHYSICAL EXAM:    Neurology: alert and oriented x 3, nonfocal, no gross deficits    CV : S1S2 heard RRR    Lungs:  clear to ausc  decreased BS right base    Abdomen: soft, nontender, nondistended, positive bowel sounds    Extremities:  no edema               enoxaparin Injectable 40 milliGRAM(s) SubCutaneous daily  finasteride 5 milliGRAM(s) Oral daily  influenza   Vaccine 0.5 milliLiter(s) IntraMuscular once  PARoxetine 20 milliGRAM(s) Oral at bedtime  sodium chloride 0.9% lock flush 3 milliLiter(s) IV Push every 8 hours  tamsulosin 0.4 milliGRAM(s) Oral at bedtime                            Discussed with Cardiothoracic Team at AM rounds.
    VITAL SIGNS    Subjective: "I'm feeling ok." Denies CP, palpitation, SOB, JOSHI, HA, dizziness, N/V/D, fever or chills.  No acute event noted overnight.     Telemetry: NSR 60-90      Vital Signs Last 24 Hrs  T(C): 36.7 (10-08-21 @ 12:33), Max: 37.2 (10-07-21 @ 19:24)  T(F): 98.1 (10-08-21 @ 12:33), Max: 99 (10-07-21 @ 19:24)  HR: 79 (10-08-21 @ 12:33) (71 - 91)  BP: 119/74 (10-08-21 @ 12:33) (110/72 - 119/74)  RR: 18 (10-08-21 @ :33) (18 - 18)  SpO2: 94% (10-08-21 @ 12:33) (91% - 94%)           10-07 @ 07:01  -  10-08 @ 07:00  --------------------------------------------------------  IN: 840 mL / OUT: 1000 mL / NET: -160 mL    10-08 @ 07:01  -  10-08 @ 17:17  --------------------------------------------------------  IN: 0 mL / OUT: 2510 mL / NET: -2510 mL    Daily     Daily Weight in k.1 (08 Oct 2021 09:08)    PHYSICAL EXAM    Neurology: alert and oriented x 3, nonfocal, no gross deficits    CV: (+) S1 and S2, No murmurs, rubs, gallops or clicks     Lungs: CTA B/L Diminished at base; Right greater than Left      Abdomen: soft, nontender, nondistended, positive bowel sounds, (+) Flatus; (+) BM     :  Voiding               Extremities:  B/L LE (+) trace edema; negative calf tenderness; (+) 2 DP palpable        enoxaparin Injectable 40 milliGRAM(s) SubCutaneous daily  finasteride 5 milliGRAM(s) Oral daily  furosemide Tablet 20 milliGRAM(s) Oral daily  influenza  Vaccine 0.5 milliLiter(s) IntraMuscular once  PARoxetine 20 milliGRAM(s) Oral at bedtime  sodium chloride 0.9% lock flush 3 milliLiter(s) IV Push every 8 hours  tamsulosin 0.4 milliGRAM(s) Oral at bedtime    Physical Therapy Rec:   Home  [ X ]   Home w/ PT  [  ]  Rehab  [  ]    Discussed with Cardiothoracic Team at AM rounds.

## 2021-10-09 NOTE — DISCHARGE NOTE PROVIDER - CARE PROVIDER_API CALL
Kofi Vasquez  HEMATOLOGY  [address], [address]  [place] [address]  Phone: [phone]  Fax: [phone]  Follow Up Time: Routine    LOUIE CABELLO  Internal Medicine  [facility] [place] [address]  Phone: [phone]  Fax: [phone]  Established Patient  Follow Up Time: 1 week

## 2021-10-09 NOTE — DISCHARGE NOTE PROVIDER - NSDCFUADDAPPT_GEN_ALL_CORE_FT
1. Follow up with his primary oncologist, Dr. Osito Liu ([facility] [place]) in 1-2 weeks.

## 2021-10-09 NOTE — DISCHARGE NOTE PROVIDER - NSDCMRMEDTOKEN_GEN_ALL_CORE_FT
cefuroxime 500 mg oral tablet: 1 tab(s) orally every 12 hours   finasteride 5 mg oral tablet: 1 tab(s) orally once a day  furosemide 20 mg oral tablet: 1 tab(s) orally once a day  PARoxetine 10 mg oral tablet: 1 tab(s) orally once a day  tamsulosin 0.4 mg oral capsule: 1 cap(s) orally once a day (at bedtime)   CHEST X-RAY PA/LA to re-evaluate Right pleural effusion :   ICD 10 J90  finasteride 5 mg oral tablet: 1 tab(s) orally once a day  furosemide 20 mg oral tablet: 1 tab(s) orally once a day  PARoxetine 10 mg oral tablet: 1 tab(s) orally once a day  tamsulosin 0.4 mg oral capsule: 1 cap(s) orally once a day (at bedtime)  Transthoracic Echocardiogram to re-evaluate pericardial effusion ICD10 I31.3:

## 2021-10-09 NOTE — DISCHARGE NOTE PROVIDER - NSDCPNSUBOBJ_GEN_ALL_CORE
VITAL SIGNS    Subjective: Denies CP, palpitation, SOB, JOSHI, HA, dizziness, N/V/D, fever or chills.  No acute event noted overnight.     Telemetry: NSR 60-70      Vital Signs Last 24 Hrs  T(C): 36.7 (10-09-21 @ 04:11), Max: 36.7 (10-08-21 @ 20:35)  T(F): 98.1 (10-09-21 @ 04:11), Max: 98.1 (10-09-21 @ 04:11)  HR: 67 (10-09-21 @ 04:11) (67 - 79)  BP: 123/78 (10-09-21 @ 04:11) (122/70 - 123/78)  RR: 18 (10-09-21 @ 04:11) (18 - 18)  SpO2: 96% (10-09-21 @ 04:11) (93% - 96%)           10-08 @ 07:01  -  10-09 @ 07:00  --------------------------------------------------------  IN: 100 mL / OUT: 3002 mL / NET: -2902 mL    10-09 @ 07:01  -  10-09 @ 15:39  --------------------------------------------------------  IN: 240 mL / OUT: 255 mL / NET: -15 mL    Daily     Daily Weight in k.2 (09 Oct 2021 08:26)    PHYSICAL EXAM    Neurology: alert and oriented x 3, nonfocal, no gross deficits    CV: (+) S1 and S2, No murmurs, rubs, gallops or clicks     Lungs: CTA B/L; Right base diminished     Abdomen: soft, nontender, nondistended, positive bowel sounds, (+) Flatus; (+) BM     :  Voiding               Extremities:  B/L LE (-) edema; negative calf tenderness; (+) 2 DP palpable        finasteride 5 milliGRAM(s) Oral daily  furosemide    Tablet 20 milliGRAM(s) Oral daily  influenza   Vaccine 0.5 milliLiter(s) IntraMuscular once  PARoxetine 20 milliGRAM(s) Oral at bedtime  sodium chloride 0.9% lock flush 3 milliLiter(s) IV Push every 8 hours  tamsulosin 0.4 milliGRAM(s) Oral at bedtime    Spent 30 min face to face encounter with patient and discharge note.

## 2021-10-09 NOTE — DISCHARGE NOTE PROVIDER - NSDCACTIVITY_GEN_ALL_CORE
No restrictions/Showering allowed/Stairs allowed/Walking - Indoors allowed/No heavy lifting/straining/Walking - Outdoors allowed

## 2021-10-09 NOTE — DISCHARGE NOTE PROVIDER - HOSPITAL COURSE
69 yo man with CLL on Imbruvica which was started 4 months ago. He appears to be responding to treatment since ALC has decreased per son report, and no nodes appreciated on exam today. He has a symptomatic R pleural effusion and questionable wall motion abnormality on outpatient echo. Progression of disease can be a cause for his effusion. Effusions have also been described from ibrutininb (serositis), which might be a more likely explanation if he is indeed responding to treatment. Admitted for further management.     Hospital Course:   10/6 HD stable CT chest done right effusion.  TTE done. Imbruvica on hold. Thoracentesis for Friday am.    10/7 HD stable. Imbruvica on hold. Thoracentesis in am.   10/8 VVS; Right Pigtail cath placed yielding 2 liters --> Pleural vac --> LWS.  Pleural fluid cytology sent. Heme/ Onc following.  Pain management.  Started on Lasix 20 mg PO daily.    10/9 VVS; CXR this AM with decrease size of right pleural effusion. Per Dr. Gaytan D/C right PTC. Follow up CXR negative for PTX.  Heme/Onc following.  Per Dr. Vasquez suspect’s effusion is more likely related to his Imbruvica, given his response to treatment, even if some CLL cells end up being seen on flow/cytology. Discussed earlier in the week with his primary oncologist, Dr. Osito Liu ([facility] [place] who agrees with my assessment. We should hold Imbruvica for now, and a decision to resume it will be made when he sees Dr. Liu.  Pfizer 3rd dose vaccination administered.  Tolerated well with no adverse reaction noted. Patient medically cleared for discharge home per Dr. Gaytan.

## 2021-10-09 NOTE — PROGRESS NOTE ADULT - ASSESSMENT
69 yo man with CLL on Imbruvica which was started 4 months ago. He appears to be responding to treatment since ALC has decreased per son report, and no nodes appreciated on exam today. He has a symptomatic R pleural effusion and questionable wall motion abnormality on outpatient echo. Progression of disease can be a cause for his effusion. Effusions have also been described from ibrutininb (serositis), which might be a more likely explanation if he is indeed responding to treatment. Admitted for further management       Hospital Course:   10/6 HD stable CT chest done right effusion.  TTE done. Imbruvica on hold. Thoracentesis for Friday am.    10/7 HD stable. Imbruvica on hold. Thoracentesis in am.   10/8 VVS; Right Pigtail cath placed yielding 2 liters --> Pleural vac --> LWS.  Pleural fluid cytology sent. Heme/ Onc following.  Pain management.  Started on Lasix 20 mg PO daily.    Disposition: Home once medically cleared. 
71 yo man with CLL on Imbruvica which was started 4 months ago. He appears to be responding to treatment since ALC has decreased per son report, and no nodes appreciated on exam today. He has a symptomatic R pleural effusion and questionable wall motion abnormality on outpatient echo.    Progression of disease can be a cause for his effusion. Effusions have also been described from ibrutininb (serositis), which might be a more likely explanation if he is indeed responding to treatment.  10/6  HD stable  CT chest done right effusion.  TTE done.  Imbruvica on hold.  Thoracentesis for Friday am.  
CLL, responding to Imbruvica  right pleural effusion, bloody, cytology and flow pending  I think his effusion is more likely related to his Imbruvica, given his response to treatment, even if some CLL cells end up being seen on flow/cytology  This side effect has been observed in some patients and usually gets better once he is off the medication  small PC effusion, likely related to the same.   Clinically improved    SUGGEST: I spoke earlier in the week with his primary oncologist, Dr. Osito Liu ([facility] [place] [facility]), who agrees with my assessment. We should hold Imbruvica for now, and a decision to resume it will be made when he sees Dr. Liu  Spoke with CTU team  I would have no objection to discharge  He will need outpatient repeat echo  Patient knows to see Dr. Liu in the next 1-2 weeks
patient with CLL and has been on treatment with Imbruvica and lymphocytosis has decreased but now he has a new pleural effusion and it has been drained and he has a chest tube.  Unclear if effusion related to the Imbruvica, CLL, or something else.  Imbruvica on hold.  Hopefully cytology was sent to assess for a malignant effusion and await those results.  Lymphocytosis due to CLL does not need any immediate intervention.    Thrombocytopenia is mild. favor due to CLL and possibly an ITP process.  Platelets higher then they were yesterday.  Kidney function is normal.  Will order an LDH and haptoglobin and fibrinogen.  If they trend down then would send a Hit assay as he is getting Lovenox for DVT prophylaxis.    patient to be seen by Dr. Vasquez tomorrow for further management.      Discussed with son at bedside.  
71 yo man with CLL on Imbruvica which was started 4 months ago. He appears to be responding to treatment since ALC has decreased per son report, and no nodes appreciated on exam today. He has a symptomatic R pleural effusion and questionable wall motion abnormality on outpatient echo.    Progression of disease can be a cause for his effusion. Effusions have also been described from ibrutininb (serositis), which might be a more likely explanation if he is indeed responding to treatment.  10/6  HD stable  CT chest done right effusion.  TTE done.  Imbruvica on hold.  Thoracentesis for Friday am.    10/7 HD stable. Imbruvica on hold.  Thoracentesis in am

## 2021-10-09 NOTE — DISCHARGE NOTE PROVIDER - PROVIDER TOKENS
PROVIDER:[TOKEN:[[id number]],FOLLOWUP:[Routine]],PROVIDER:[TOKEN:[[id number]],FOLLOWUP:[1 week],ESTABLISHEDPATIENT:[T]]

## 2021-10-09 NOTE — DISCHARGE NOTE NURSING/CASE MANAGEMENT/SOCIAL WORK - PATIENT PORTAL LINK FT
You can access the FollowMyHealth Patient Portal offered by [facility] by registering at the following website: [email or web address]. By joining [employer]’s FollowMyHealth portal, you will also be able to view your health information using other applications (apps) compatible with our system.

## 2021-10-09 NOTE — DISCHARGE NOTE PROVIDER - NSDCCPCAREPLAN_GEN_ALL_CORE_FT
PRINCIPAL DISCHARGE DIAGNOSIS  Diagnosis: Pleural effusion, right  Assessment and Plan of Treatment:   1. You may shower starting tomorrow   2. Weight yourself daily and notify any weight gain greater than 2-3 pounds in 24 hours.  3. Regular DASH diet - low fat, low cholesterol, no added salt.  4. No heavy lifting or straining x 2 day.   5. Call / Notify MD any fever greater than 101.0  6. Increase Activity as tolerated.   7. Resume home medication as prescribed and instructed in discharge paperwork.   8. Repeat Chest X-ray in 1 week   9. Repeat Transthoracic Echocardiogram (TTE)  in 2 weeks   10.  Continue on Lasix 20 mg 1 tab daily x 7 days then discontinue

## 2021-10-11 LAB — TM INTERPRETATION: SIGNIFICANT CHANGE UP

## 2021-10-12 LAB — NON-GYNECOLOGICAL CYTOLOGY STUDY: SIGNIFICANT CHANGE UP

## 2021-10-20 ENCOUNTER — APPOINTMENT (OUTPATIENT)
Dept: HEART AND VASCULAR | Facility: CLINIC | Age: 71
End: 2021-10-20
Payer: MEDICARE

## 2021-10-20 ENCOUNTER — LABORATORY RESULT (OUTPATIENT)
Age: 71
End: 2021-10-20

## 2021-10-20 ENCOUNTER — NON-APPOINTMENT (OUTPATIENT)
Age: 71
End: 2021-10-20

## 2021-10-20 VITALS
WEIGHT: 172 LBS | HEART RATE: 80 BPM | DIASTOLIC BLOOD PRESSURE: 80 MMHG | HEIGHT: 62 IN | RESPIRATION RATE: 14 BRPM | BODY MASS INDEX: 31.65 KG/M2 | SYSTOLIC BLOOD PRESSURE: 125 MMHG

## 2021-10-20 DIAGNOSIS — Z09 ENCOUNTER FOR FOLLOW-UP EXAMINATION AFTER COMPLETED TREATMENT FOR CONDITIONS OTHER THAN MALIGNANT NEOPLASM: ICD-10-CM

## 2021-10-20 PROCEDURE — 93308 TTE F-UP OR LMTD: CPT

## 2021-10-20 PROCEDURE — 36415 COLL VENOUS BLD VENIPUNCTURE: CPT

## 2021-10-20 PROCEDURE — 99214 OFFICE O/P EST MOD 30 MIN: CPT

## 2021-10-20 PROCEDURE — 93321 DOPPLER ECHO F-UP/LMTD STD: CPT

## 2021-10-20 PROCEDURE — 93000 ELECTROCARDIOGRAM COMPLETE: CPT

## 2021-10-20 PROCEDURE — ZZZZZ: CPT

## 2021-10-20 PROCEDURE — 93325 DOPPLER ECHO COLOR FLOW MAPG: CPT

## 2021-10-20 NOTE — PHYSICAL EXAM
[Well Developed] : well developed [Well Nourished] : well nourished [No Acute Distress] : no acute distress [Normal Conjunctiva] : normal conjunctiva [Normal Venous Pressure] : normal venous pressure [Normal S1, S2] : normal S1, S2 [No Rub] : no rub [No Gallop] : no gallop [Murmur] : murmur [Clear Lung Fields] : clear lung fields [Good Air Entry] : good air entry [No Respiratory Distress] : no respiratory distress  [Soft] : abdomen soft [Non Tender] : non-tender [No Masses/organomegaly] : no masses/organomegaly [Normal Bowel Sounds] : normal bowel sounds [Normal Gait] : normal gait [No Edema] : no edema [No Cyanosis] : no cyanosis [No Clubbing] : no clubbing [No Varicosities] : no varicosities [No Rash] : no rash [No Skin Lesions] : no skin lesions [Moves all extremities] : moves all extremities [No Focal Deficits] : no focal deficits [Normal Speech] : normal speech [Alert and Oriented] : alert and oriented [Normal memory] : normal memory [de-identified] : 2/6 JENI-> Axilla

## 2021-10-20 NOTE — HISTORY OF PRESENT ILLNESS
[FreeTextEntry1] : Denies Chest Pain, SOB, Dizziness, Leg edema, Orthopnea, PND, Palpitations, Syncope, JOSHI, Diaphoresis.\par Pt. s/p hospitalization for bilateral pleural effusions s/p drainage (fluid positive for CLL- see Path).\par Pt. comes with prescription for f/u echo to evaluate for residual pericardial effusion (PE noted on hospitalization)

## 2021-10-20 NOTE — DISCUSSION/SUMMARY
[Mitral Regurgitation] : mitral regurgitation [Stable] : stable [None] : There are no changes in medication management [Sodium Restriction] : sodium restriction [Patient] : the patient [FreeTextEntry1] : Pericardial effusion- f/u echo without evidence of significant residual PE.\par Blood work drawn. Maintain a low caloric, low sodium, low cholesterol  diet. Dietary counseling given, diet and exercise discussed in detail.

## 2021-10-21 LAB
ALBUMIN SERPL ELPH-MCNC: 4.2 G/DL
ALP BLD-CCNC: 64 U/L
ALT SERPL-CCNC: 14 U/L
ANION GAP SERPL CALC-SCNC: 14 MMOL/L
AST SERPL-CCNC: 10 U/L
BILIRUB SERPL-MCNC: 0.3 MG/DL
BUN SERPL-MCNC: 17 MG/DL
CALCIUM SERPL-MCNC: 9.4 MG/DL
CHLORIDE SERPL-SCNC: 104 MMOL/L
CO2 SERPL-SCNC: 24 MMOL/L
CREAT SERPL-MCNC: 1.01 MG/DL
GLUCOSE SERPL-MCNC: 130 MG/DL
POTASSIUM SERPL-SCNC: 4.3 MMOL/L
PROT SERPL-MCNC: 5.9 G/DL
SODIUM SERPL-SCNC: 142 MMOL/L

## 2021-10-21 NOTE — CONSULT NOTE ADULT - DOES PATIENT HAVE ADVANCE DIRECTIVE
----- Message from Malena Maxwell MD sent at 10/19/2021  9:31 AM CDT -----  Notify patient recent blood test were normal except that his liver is still inflamed due to the fatty liver.  I would like to recheck a liver ultrasound to check the status of his fatty liver.  Give central scheduling for patient to schedule the appointment for the liver ultrasound  
Patient was informed of results. Patient expressed understanding.  Patient was advised to call   [facility] at [phone]  
No

## 2021-10-26 ENCOUNTER — NON-APPOINTMENT (OUTPATIENT)
Age: 71
End: 2021-10-26

## 2021-10-26 LAB
BASOPHILS # BLD AUTO: 0 K/UL
BASOPHILS NFR BLD AUTO: 0 %
EOSINOPHIL # BLD AUTO: 0.22 K/UL
EOSINOPHIL NFR BLD AUTO: 4 %
HCT VFR BLD CALC: 41.6 %
HGB BLD-MCNC: 12.3 G/DL
LYMPHOCYTES # BLD AUTO: 4.07 K/UL
LYMPHOCYTES NFR BLD AUTO: 74 %
MAN DIFF?: NORMAL
MCHC RBC-ENTMCNC: 27.5 PG
MCHC RBC-ENTMCNC: 29.6 GM/DL
MCV RBC AUTO: 93.1 FL
MONOCYTES # BLD AUTO: 0.55 K/UL
MONOCYTES NFR BLD AUTO: 10 %
NEUTROPHILS # BLD AUTO: 0.39 K/UL
NEUTROPHILS NFR BLD AUTO: 7 %
PLATELET # BLD AUTO: 138 K/UL
RBC # BLD: 4.47 M/UL
RBC # FLD: 14.8 %
WBC # FLD AUTO: 5.5 K/UL

## 2021-10-26 PROCEDURE — U0005: CPT

## 2021-10-26 PROCEDURE — 87640 STAPH A DNA AMP PROBE: CPT

## 2021-10-26 PROCEDURE — 85025 COMPLETE CBC W/AUTO DIFF WBC: CPT

## 2021-10-26 PROCEDURE — 86769 SARS-COV-2 COVID-19 ANTIBODY: CPT

## 2021-10-26 PROCEDURE — 88112 CYTOPATH CELL ENHANCE TECH: CPT

## 2021-10-26 PROCEDURE — 84484 ASSAY OF TROPONIN QUANT: CPT

## 2021-10-26 PROCEDURE — 71046 X-RAY EXAM CHEST 2 VIEWS: CPT

## 2021-10-26 PROCEDURE — 71045 X-RAY EXAM CHEST 1 VIEW: CPT

## 2021-10-26 PROCEDURE — 86901 BLOOD TYPING SEROLOGIC RH(D): CPT

## 2021-10-26 PROCEDURE — 85730 THROMBOPLASTIN TIME PARTIAL: CPT

## 2021-10-26 PROCEDURE — 87205 SMEAR GRAM STAIN: CPT

## 2021-10-26 PROCEDURE — 81001 URINALYSIS AUTO W/SCOPE: CPT

## 2021-10-26 PROCEDURE — 85384 FIBRINOGEN ACTIVITY: CPT

## 2021-10-26 PROCEDURE — 86900 BLOOD TYPING SEROLOGIC ABO: CPT

## 2021-10-26 PROCEDURE — 36415 COLL VENOUS BLD VENIPUNCTURE: CPT

## 2021-10-26 PROCEDURE — U0003: CPT

## 2021-10-26 PROCEDURE — 94010 BREATHING CAPACITY TEST: CPT

## 2021-10-26 PROCEDURE — 85027 COMPLETE CBC AUTOMATED: CPT

## 2021-10-26 PROCEDURE — 87641 MR-STAPH DNA AMP PROBE: CPT

## 2021-10-26 PROCEDURE — 84157 ASSAY OF PROTEIN OTHER: CPT

## 2021-10-26 PROCEDURE — 80048 BASIC METABOLIC PNL TOTAL CA: CPT

## 2021-10-26 PROCEDURE — 83615 LACTATE (LD) (LDH) ENZYME: CPT

## 2021-10-26 PROCEDURE — 86850 RBC ANTIBODY SCREEN: CPT

## 2021-10-26 PROCEDURE — 99285 EMERGENCY DEPT VISIT HI MDM: CPT | Mod: 25

## 2021-10-26 PROCEDURE — 83036 HEMOGLOBIN GLYCOSYLATED A1C: CPT

## 2021-10-26 PROCEDURE — 88305 TISSUE EXAM BY PATHOLOGIST: CPT

## 2021-10-26 PROCEDURE — 88184 FLOWCYTOMETRY/ TC 1 MARKER: CPT

## 2021-10-26 PROCEDURE — 71250 CT THORAX DX C-: CPT

## 2021-10-26 PROCEDURE — 84443 ASSAY THYROID STIM HORMONE: CPT

## 2021-10-26 PROCEDURE — 84480 ASSAY TRIIODOTHYRONINE (T3): CPT

## 2021-10-26 PROCEDURE — 84439 ASSAY OF FREE THYROXINE: CPT

## 2021-10-26 PROCEDURE — 89051 BODY FLUID CELL COUNT: CPT

## 2021-10-26 PROCEDURE — 83880 ASSAY OF NATRIURETIC PEPTIDE: CPT

## 2021-10-26 PROCEDURE — 83010 ASSAY OF HAPTOGLOBIN QUANT: CPT

## 2021-10-26 PROCEDURE — 88185 FLOWCYTOMETRY/TC ADD-ON: CPT

## 2021-10-26 PROCEDURE — 85610 PROTHROMBIN TIME: CPT

## 2021-10-26 PROCEDURE — 84134 ASSAY OF PREALBUMIN: CPT

## 2021-10-26 PROCEDURE — 80053 COMPREHEN METABOLIC PANEL: CPT

## 2021-10-26 PROCEDURE — 96374 THER/PROPH/DIAG INJ IV PUSH: CPT

## 2021-10-26 PROCEDURE — 84436 ASSAY OF TOTAL THYROXINE: CPT

## 2021-10-26 PROCEDURE — 93005 ELECTROCARDIOGRAM TRACING: CPT

## 2022-01-12 NOTE — PATIENT PROFILE ADULT - NSPROPASSIVESMOKEEXPOSURE_GEN_A_NUR
[facility] Transitions Follow Up Call    2022    Patient: Moody Geronimo  Patient : 1938   MRN: <[id number]>  Reason for Admission: PNEUMONIA  Discharge Date: 21 RARS: Readmission Risk Score: 9.7 ( )         Spoke with: SUBSEQUENT CALL. No answer. Left HIPPA compliant message to return call to this writer. Care Transitions Subsequent and Final Call    Subsequent and Final Calls  Care Transitions Interventions  Other Interventions:            Follow Up  Future Appointments   Date Time Provider Gisela Harding   2022  2:40 PM Contreras Villareal MD TIFF NEURO [facility]   2022  1:00 PM Steve Quesada MD TIFF CARD [facility]   3/7/2022  2:00 PM Marifer Yeung MD TIFF PULM [facility]   2022  2:40 PM GENA Jackman - CNP Tiff Prim Ca [facility]       Shelly Brown[address] Care Transitions Nurse   [phone] No

## 2022-01-31 NOTE — ED ADULT NURSE NOTE - NSFALLRSKASSESSTYPE_ED_ALL_ED
Routine Prenatal Visit  [facility] [address] [place] [address]    Assessment/Plan:  Mariana Son is a 33y o  year old  at 17w5d who presents for routine prenatal visit  1  17 weeks gestation of pregnancy  Assessment & Plan:  - Declines mSAFP  - Level 2 ultrasound scheduled  - Return for prenatal visit in 4 weeks      2  Thrombocytopenia complicating pregnancy [facility])  Assessment & Plan:  - Reviewed hematology recommendations  - Has follow up in 3 months          Subjective:     CC: Prenatal care    Aleksandar Rodríguez is a 33 y o   female who presents for routine prenatal care at 17w5d  Pregnancy ROS: Denies leakage of fluid, pelvic pain, or vaginal bleeding  Reports normal fetal movement  The following portions of the patient's history were reviewed and updated as appropriate: allergies, current medications, past family history, past medical history, obstetric history, gynecologic history, past social history, past surgical history and problem list       Objective:  /70   Wt 64 4 kg (142 lb)   LMP 2021   BMI 25 97 kg/m²   Pregravid Weight/BMI: 59 kg (130 lb) (BMI 23 77)  Current Weight: 64 4 kg (142 lb)   Total Weight Gain: 5 443 kg (12 lb)   Pre- Vitals      Most Recent Value   Prenatal Assessment    Fetal Heart Rate 150   Prenatal Vitals    Blood Pressure 110/70   Weight - Scale 64 4 kg (142 lb)   Urine Albumin/Glucose    Dilation/Effacement/Station    Vaginal Drainage    Edema    LLE Edema None   RLE Edema None           General: Well appearing, no distress  Respiratory: Unlabored breathing  Cardiovascular: Regular rate  Abdomen: Soft, gravid, nontender  Fundal Height: Appropriate for gestational age  Extremities: Warm and well perfused  Non tender      Neena Hutchins MD  [address] [place] [address]  2022 10:31 AM
Initial (On Arrival)

## 2022-02-08 ENCOUNTER — LABORATORY RESULT (OUTPATIENT)
Age: 72
End: 2022-02-08

## 2022-02-08 ENCOUNTER — NON-APPOINTMENT (OUTPATIENT)
Age: 72
End: 2022-02-08

## 2022-02-08 ENCOUNTER — APPOINTMENT (OUTPATIENT)
Dept: HEART AND VASCULAR | Facility: CLINIC | Age: 72
End: 2022-02-08
Payer: MEDICARE

## 2022-02-08 VITALS
RESPIRATION RATE: 14 BRPM | WEIGHT: 178 LBS | HEIGHT: 62 IN | BODY MASS INDEX: 32.76 KG/M2 | SYSTOLIC BLOOD PRESSURE: 120 MMHG | HEART RATE: 79 BPM | DIASTOLIC BLOOD PRESSURE: 78 MMHG

## 2022-02-08 PROCEDURE — 99214 OFFICE O/P EST MOD 30 MIN: CPT

## 2022-02-08 PROCEDURE — ZZZZZ: CPT

## 2022-02-08 PROCEDURE — 93321 DOPPLER ECHO F-UP/LMTD STD: CPT

## 2022-02-08 PROCEDURE — 93000 ELECTROCARDIOGRAM COMPLETE: CPT

## 2022-02-08 PROCEDURE — 93308 TTE F-UP OR LMTD: CPT

## 2022-02-08 PROCEDURE — 36415 COLL VENOUS BLD VENIPUNCTURE: CPT

## 2022-02-08 PROCEDURE — 93325 DOPPLER ECHO COLOR FLOW MAPG: CPT

## 2022-02-08 RX ORDER — DIPHENHYDRAMINE HCL 25 MG/1
25 CAPSULE ORAL
Qty: 30 | Refills: 3 | Status: DISCONTINUED | COMMUNITY
Start: 2020-02-17 | End: 2022-02-08

## 2022-02-08 NOTE — END OF VISIT
[FreeTextEntry3] : All medical record entries made by the Scribe were at my, Dr. Graham Torrez, direction and personally dictated by me on 02/08/2022. I have reviewed the chart and agree that the record accurately reflects my personal performance of the history, physical exam, assessment and plan. I have also personally directed, reviewed, and agreed with the chart. [Time Spent: ___ minutes] : I have spent [unfilled] minutes of time on the encounter.

## 2022-02-08 NOTE — PHYSICAL EXAM

## 2022-02-08 NOTE — HISTORY OF PRESENT ILLNESS
[FreeTextEntry1] : Denies Chest Pain, SOB, Dizziness, Leg edema, Orthopnea, PND, Palpitations, Syncope, JOSHI, Diaphoresis.\par Echo ordered to follow up on status of pericardial effusion (h/o PE s/p hospitalization).

## 2022-02-08 NOTE — ADDENDUM
[FreeTextEntry1] : Documented by Grady Gomez acting as a scribe for Dr. Graham Torrez on 02/08/2022.

## 2022-02-08 NOTE — DISCUSSION/SUMMARY
[Hyperlipidemia] : hyperlipidemia [Diet Modification] : diet modification [Mitral Regurgitation] : mitral regurgitation [Stable] : stable [None] : There are no changes in medication management [Sodium Restriction] : sodium restriction [Patient] : the patient [FreeTextEntry1] : Pericardial effusion- Stable; no further intervention at this time (see echo).\par Blood work drawn. Maintain a low caloric, low sodium, low cholesterol diet. Dietary counseling given, diet and exercise discussed in detail.

## 2022-02-10 ENCOUNTER — NON-APPOINTMENT (OUTPATIENT)
Age: 72
End: 2022-02-10

## 2022-02-10 LAB
25(OH)D3 SERPL-MCNC: 81.2 NG/ML
ALBUMIN SERPL ELPH-MCNC: 4.9 G/DL
ALP BLD-CCNC: 55 U/L
ALT SERPL-CCNC: 11 U/L
ANION GAP SERPL CALC-SCNC: 16 MMOL/L
AST SERPL-CCNC: 13 U/L
BASOPHILS # BLD AUTO: 0.04 K/UL
BASOPHILS NFR BLD AUTO: 0.4 %
BILIRUB SERPL-MCNC: 0.4 MG/DL
BUN SERPL-MCNC: 22 MG/DL
CALCIUM SERPL-MCNC: 9.7 MG/DL
CHLORIDE SERPL-SCNC: 100 MMOL/L
CHOLEST SERPL-MCNC: 219 MG/DL
CO2 SERPL-SCNC: 24 MMOL/L
COVID-19 SPIKE DOMAIN ANTIBODY INTERPRETATION: NEGATIVE
CREAT SERPL-MCNC: 0.91 MG/DL
EOSINOPHIL # BLD AUTO: 0.13 K/UL
EOSINOPHIL NFR BLD AUTO: 1.2 %
ESTIMATED AVERAGE GLUCOSE: 114 MG/DL
FOLATE SERPL-MCNC: >20 NG/ML
GLUCOSE SERPL-MCNC: 106 MG/DL
HBA1C MFR BLD HPLC: 5.6 %
HCT VFR BLD CALC: 45.2 %
HDLC SERPL-MCNC: 34 MG/DL
HGB BLD-MCNC: 14.6 G/DL
IMM GRANULOCYTES NFR BLD AUTO: 0.2 %
LDLC SERPL CALC-MCNC: 141 MG/DL
LYMPHOCYTES # BLD AUTO: 6.7 K/UL
LYMPHOCYTES NFR BLD AUTO: 60.7 %
MAN DIFF?: NORMAL
MCHC RBC-ENTMCNC: 30 PG
MCHC RBC-ENTMCNC: 32.3 GM/DL
MCV RBC AUTO: 93 FL
MONOCYTES # BLD AUTO: 0.57 K/UL
MONOCYTES NFR BLD AUTO: 5.2 %
NEUTROPHILS # BLD AUTO: 3.58 K/UL
NEUTROPHILS NFR BLD AUTO: 32.3 %
NONHDLC SERPL-MCNC: 185 MG/DL
PLATELET # BLD AUTO: 115 K/UL
POTASSIUM SERPL-SCNC: 4 MMOL/L
PROT SERPL-MCNC: 6.7 G/DL
PSA FREE FLD-MCNC: 25 %
PSA FREE SERPL-MCNC: 0.51 NG/ML
PSA SERPL-MCNC: 2.04 NG/ML
RBC # BLD: 4.86 M/UL
RBC # FLD: 14.6 %
SARS-COV-2 AB SERPL IA-ACNC: 0.4 U/ML
SODIUM SERPL-SCNC: 140 MMOL/L
T3 SERPL-MCNC: 97 NG/DL
T3FREE SERPL-MCNC: 2.9 PG/ML
T3RU NFR SERPL: 1.1 TBI
T4 FREE SERPL-MCNC: 1.2 NG/DL
T4 SERPL-MCNC: 7.3 UG/DL
TRIGL SERPL-MCNC: 222 MG/DL
TSH SERPL-ACNC: 2.25 UIU/ML
VIT B12 SERPL-MCNC: 727 PG/ML
WBC # FLD AUTO: 11.04 K/UL

## 2022-02-16 ENCOUNTER — APPOINTMENT (OUTPATIENT)
Dept: HEART AND VASCULAR | Facility: CLINIC | Age: 72
End: 2022-02-16
Payer: MEDICARE

## 2022-02-16 PROCEDURE — 99212 OFFICE O/P EST SF 10 MIN: CPT

## 2022-02-16 NOTE — HISTORY OF PRESENT ILLNESS
[FreeTextEntry1] : review of blood work [de-identified] : Pt has been vaccinated with Pfizer vaccine 2 series and a booster. Last blood test did not show presence of antibodies. \par Pt with hx of leukemia; treatments stopped 3 months ago. \par He denies c/o fever, chills, weakness, fatigue, body aches, SOB, difficulty breathing, loss of sense of smell or taste. Pt has not traveled outside the state or country; denies contacts with sick persons or confirmed COVID 19 cases.

## 2022-04-25 ENCOUNTER — APPOINTMENT (OUTPATIENT)
Dept: HEART AND VASCULAR | Facility: CLINIC | Age: 72
End: 2022-04-25

## 2022-06-12 ENCOUNTER — TRANSCRIPTION ENCOUNTER (OUTPATIENT)
Age: 72
End: 2022-06-12

## 2022-06-13 ENCOUNTER — OUTPATIENT (OUTPATIENT)
Dept: OUTPATIENT SERVICES | Facility: HOSPITAL | Age: 72
LOS: 1 days | End: 2022-06-13

## 2022-06-13 ENCOUNTER — APPOINTMENT (OUTPATIENT)
Dept: DISASTER EMERGENCY | Facility: HOSPITAL | Age: 72
End: 2022-06-13

## 2022-06-13 VITALS
HEART RATE: 71 BPM | RESPIRATION RATE: 16 BRPM | TEMPERATURE: 99 F | OXYGEN SATURATION: 99 % | DIASTOLIC BLOOD PRESSURE: 60 MMHG | SYSTOLIC BLOOD PRESSURE: 104 MMHG

## 2022-06-13 VITALS
TEMPERATURE: 99 F | SYSTOLIC BLOOD PRESSURE: 141 MMHG | HEART RATE: 78 BPM | RESPIRATION RATE: 16 BRPM | DIASTOLIC BLOOD PRESSURE: 90 MMHG | OXYGEN SATURATION: 99 %

## 2022-06-13 DIAGNOSIS — Z98.890 OTHER SPECIFIED POSTPROCEDURAL STATES: Chronic | ICD-10-CM

## 2022-06-13 DIAGNOSIS — U07.1 COVID-19: ICD-10-CM

## 2022-06-13 RX ORDER — BEBTELOVIMAB 87.5 MG/ML
175 INJECTION, SOLUTION INTRAVENOUS ONCE
Refills: 0 | Status: COMPLETED | OUTPATIENT
Start: 2022-06-13 | End: 2022-06-13

## 2022-06-13 RX ADMIN — BEBTELOVIMAB 175 MILLIGRAM(S): 87.5 INJECTION, SOLUTION INTRAVENOUS at 11:50

## 2022-06-13 NOTE — CHART NOTE - NSCHARTNOTEFT_GEN_A_CORE
CC: Monoclonal Antibody Administration/COVID 19 Positive      History: Patient presents for administration of monoclonal antibody  Patient has been screened and was deemed to be a candidate.    Exposure: Bus trip last week to [place]    Symptoms/ Criteria: headache cough sore throat    Inclusion Criteria: age > 70 years  Leukemia    Date of Positive Test: verified by clinical call center     Date of symptom onset: 6/11    Vaccine status: Pfizer x 4 > last 4/22    PMHx:  Infection due to severe acute respiratory syndrome coronavirus 2 (SARS-CoV-2)    CLL (chronic lymphocytic leukemia)    History of BPH    H/O excision of mass          T(C): 37.4 (06-13-22 @ 11:50), Max: 37.4 (06-13-22 @ 11:50)  HR: 78 (06-13-22 @ 11:50) (78 - 78)  BP: 141/90 (06-13-22 @ 11:50) (141/90 - 141/90)  RR: 16 (06-13-22 @ 11:50) (16 - 16)  SpO2: 99% (06-13-22 @ 11:50) (99% - 99%)      PE:   Appearance: NAD	  HEENT:   Normal oral mucosa.   Lymphatic: No lymphadenopathy  Cardiovascular: Normal S1 S2, No JVD, No murmurs, No edema  Respiratory: Lungs clear to auscultation	  Gastrointestinal:  Soft, Non-tender. No guarding   Skin: warm and dry  Neurologic: Non-focal  Extremities: Normal range of motion.     ASSESSMENT:  Pt is a 71y year old Male with PMH Leukemia HTN age > 70 years  Covid +  referred to the infusion center for Monoclonal antibody administration  (Bebtelovimab).        PLAN:  - Administration procedure explained to patient   - Consent for monoclonal antibody administration obtained   - Risk & benefits discussed/all questions answered  - Administer Bebtelovimab per protocol  - will observe patient for one hour post administration  and then if stable discharge home with outpt follow up as planned by PMD.        - Patient tolerated treatment well denies complaints of chest pain/SOB/dizziness/ palpitations  - VSS for discharge home  - D/C instructions given/ fact sheet included.  - Patient to follow-up with PCP as needed.

## 2022-08-08 ENCOUNTER — APPOINTMENT (OUTPATIENT)
Dept: HEART AND VASCULAR | Facility: CLINIC | Age: 72
End: 2022-08-08

## 2022-08-09 ENCOUNTER — APPOINTMENT (OUTPATIENT)
Dept: HEART AND VASCULAR | Facility: CLINIC | Age: 72
End: 2022-08-09

## 2022-08-09 ENCOUNTER — LABORATORY RESULT (OUTPATIENT)
Age: 72
End: 2022-08-09

## 2022-08-09 PROCEDURE — 36415 COLL VENOUS BLD VENIPUNCTURE: CPT

## 2022-08-11 LAB
25(OH)D3 SERPL-MCNC: 54.2 NG/ML
ALBUMIN SERPL ELPH-MCNC: 4.9 G/DL
ALP BLD-CCNC: 56 U/L
ALT SERPL-CCNC: 20 U/L
ANION GAP SERPL CALC-SCNC: 13 MMOL/L
AST SERPL-CCNC: 13 U/L
BASOPHILS # BLD AUTO: 0.11 K/UL
BASOPHILS NFR BLD AUTO: 0.9 %
BILIRUB SERPL-MCNC: 0.4 MG/DL
BUN SERPL-MCNC: 17 MG/DL
CALCIUM SERPL-MCNC: 9.7 MG/DL
CHLORIDE SERPL-SCNC: 105 MMOL/L
CHOLEST SERPL-MCNC: 181 MG/DL
CO2 SERPL-SCNC: 25 MMOL/L
COVID-19 SPIKE DOMAIN ANTIBODY INTERPRETATION: POSITIVE
CREAT SERPL-MCNC: 0.98 MG/DL
EGFR: 82 ML/MIN/1.73M2
EOSINOPHIL # BLD AUTO: 0 K/UL
EOSINOPHIL NFR BLD AUTO: 0 %
ESTIMATED AVERAGE GLUCOSE: 111 MG/DL
GLUCOSE SERPL-MCNC: 108 MG/DL
HBA1C MFR BLD HPLC: 5.5 %
HCT VFR BLD CALC: 45.8 %
HDLC SERPL-MCNC: 35 MG/DL
HGB BLD-MCNC: 14.4 G/DL
LDLC SERPL CALC-MCNC: 100 MG/DL
LYMPHOCYTES # BLD AUTO: 8.98 K/UL
LYMPHOCYTES NFR BLD AUTO: 72.4 %
MAN DIFF?: NORMAL
MCHC RBC-ENTMCNC: 30.1 PG
MCHC RBC-ENTMCNC: 31.4 GM/DL
MCV RBC AUTO: 95.6 FL
MONOCYTES # BLD AUTO: 0.64 K/UL
MONOCYTES NFR BLD AUTO: 5.2 %
NEUTROPHILS # BLD AUTO: 2.46 K/UL
NEUTROPHILS NFR BLD AUTO: 19.8 %
NONHDLC SERPL-MCNC: 146 MG/DL
PLATELET # BLD AUTO: 111 K/UL
POTASSIUM SERPL-SCNC: 4.4 MMOL/L
PROT SERPL-MCNC: 6.7 G/DL
PSA FREE FLD-MCNC: 18 %
PSA FREE SERPL-MCNC: 0.47 NG/ML
PSA SERPL-MCNC: 2.56 NG/ML
RBC # BLD: 4.79 M/UL
RBC # FLD: 14.5 %
SARS-COV-2 AB SERPL IA-ACNC: >250 U/ML
SODIUM SERPL-SCNC: 142 MMOL/L
T3 SERPL-MCNC: 106 NG/DL
T3FREE SERPL-MCNC: 2.96 PG/ML
T4 FREE SERPL-MCNC: 1.3 NG/DL
T4 SERPL-MCNC: 8.1 UG/DL
TRIGL SERPL-MCNC: 234 MG/DL
TSH SERPL-ACNC: 4.66 UIU/ML
WBC # FLD AUTO: 12.4 K/UL

## 2022-08-16 ENCOUNTER — APPOINTMENT (OUTPATIENT)
Dept: HEART AND VASCULAR | Facility: CLINIC | Age: 72
End: 2022-08-16

## 2022-08-16 ENCOUNTER — NON-APPOINTMENT (OUTPATIENT)
Age: 72
End: 2022-08-16

## 2022-08-16 VITALS
HEART RATE: 79 BPM | HEIGHT: 62 IN | SYSTOLIC BLOOD PRESSURE: 120 MMHG | RESPIRATION RATE: 14 BRPM | DIASTOLIC BLOOD PRESSURE: 80 MMHG | BODY MASS INDEX: 32.57 KG/M2 | WEIGHT: 177 LBS

## 2022-08-16 DIAGNOSIS — R89.9 UNSPECIFIED ABNORMAL FINDING IN SPECIMENS FROM OTHER ORGANS, SYSTEMS AND TISSUES: ICD-10-CM

## 2022-08-16 PROCEDURE — 99214 OFFICE O/P EST MOD 30 MIN: CPT

## 2022-08-16 PROCEDURE — 93000 ELECTROCARDIOGRAM COMPLETE: CPT

## 2022-08-16 NOTE — PHYSICAL EXAM

## 2022-08-16 NOTE — DISCUSSION/SUMMARY
[Hyperlipidemia] : hyperlipidemia [Diet Modification] : diet modification [Mitral Regurgitation] : mitral regurgitation [Stable] : stable [None] : There are no changes in medication management [Sodium Restriction] : sodium restriction [Patient] : the patient [FreeTextEntry1] : Blood work reviewed with patient. Maintain a low caloric, low sodium, low cholesterol diet. Dietary counseling given, diet and exercise discussed in detail.

## 2022-08-16 NOTE — END OF VISIT
[FreeTextEntry3] : All medical record entries made by the Scribe were at my, Dr. Graham Torrez, direction and personally dictated by me on 08/16/2022. I have reviewed the chart and agree that the record accurately reflects my personal performance of the history, physical exam, assessment and plan. I have also personally directed, reviewed, and agreed with the chart.  [Time Spent: ___ minutes] : I have spent [unfilled] minutes of time on the encounter.

## 2022-08-16 NOTE — HISTORY OF PRESENT ILLNESS
[FreeTextEntry1] : Denies Chest Pain, SOB, Dizziness, Leg edema, Orthopnea, PND, Palpitations, Syncope, JOSHI, Diaphoresis.

## 2023-02-21 ENCOUNTER — APPOINTMENT (OUTPATIENT)
Dept: HEART AND VASCULAR | Facility: CLINIC | Age: 73
End: 2023-02-21

## 2023-02-27 ENCOUNTER — APPOINTMENT (OUTPATIENT)
Dept: HEART AND VASCULAR | Facility: CLINIC | Age: 73
End: 2023-02-27

## 2023-03-27 ENCOUNTER — RX RENEWAL (OUTPATIENT)
Age: 73
End: 2023-03-27

## 2023-04-03 ENCOUNTER — LABORATORY RESULT (OUTPATIENT)
Age: 73
End: 2023-04-03

## 2023-04-03 ENCOUNTER — APPOINTMENT (OUTPATIENT)
Dept: HEART AND VASCULAR | Facility: CLINIC | Age: 73
End: 2023-04-03
Payer: MEDICARE

## 2023-04-03 PROCEDURE — 36415 COLL VENOUS BLD VENIPUNCTURE: CPT

## 2023-04-04 ENCOUNTER — NON-APPOINTMENT (OUTPATIENT)
Age: 73
End: 2023-04-04

## 2023-04-04 ENCOUNTER — APPOINTMENT (OUTPATIENT)
Dept: HEART AND VASCULAR | Facility: CLINIC | Age: 73
End: 2023-04-04
Payer: MEDICARE

## 2023-04-04 VITALS
RESPIRATION RATE: 14 BRPM | BODY MASS INDEX: 32.2 KG/M2 | SYSTOLIC BLOOD PRESSURE: 115 MMHG | HEIGHT: 62 IN | HEART RATE: 79 BPM | WEIGHT: 175 LBS | DIASTOLIC BLOOD PRESSURE: 70 MMHG

## 2023-04-04 DIAGNOSIS — I31.39 OTHER PERICARDIAL EFFUSION (NONINFLAMMATORY): ICD-10-CM

## 2023-04-04 DIAGNOSIS — I65.23 OCCLUSION AND STENOSIS OF BILATERAL CAROTID ARTERIES: ICD-10-CM

## 2023-04-04 DIAGNOSIS — R97.20 ELEVATED PROSTATE, SPECIFIC ANTIGEN [PSA]: ICD-10-CM

## 2023-04-04 LAB
25(OH)D3 SERPL-MCNC: 49.9 NG/ML
ALBUMIN SERPL ELPH-MCNC: 4.6 G/DL
ALP BLD-CCNC: 82 U/L
ALT SERPL-CCNC: 13 U/L
ANION GAP SERPL CALC-SCNC: 16 MMOL/L
AST SERPL-CCNC: 15 U/L
BILIRUB SERPL-MCNC: 0.4 MG/DL
BUN SERPL-MCNC: 22 MG/DL
CALCIUM SERPL-MCNC: 9.3 MG/DL
CHLORIDE SERPL-SCNC: 103 MMOL/L
CHOLEST SERPL-MCNC: 184 MG/DL
CO2 SERPL-SCNC: 24 MMOL/L
COVID-19 SPIKE DOMAIN ANTIBODY INTERPRETATION: POSITIVE
CREAT SERPL-MCNC: 0.85 MG/DL
EGFR: 92 ML/MIN/1.73M2
ESTIMATED AVERAGE GLUCOSE: 120 MG/DL
FOLATE SERPL-MCNC: >20 NG/ML
GLUCOSE SERPL-MCNC: 101 MG/DL
HBA1C MFR BLD HPLC: 5.8 %
HDLC SERPL-MCNC: 34 MG/DL
LDLC SERPL CALC-MCNC: 123 MG/DL
NONHDLC SERPL-MCNC: 150 MG/DL
POTASSIUM SERPL-SCNC: 4.5 MMOL/L
PROT SERPL-MCNC: 6.4 G/DL
PSA FREE FLD-MCNC: 17 %
PSA FREE SERPL-MCNC: 0.41 NG/ML
PSA SERPL-MCNC: 2.47 NG/ML
SARS-COV-2 AB SERPL IA-ACNC: >250 U/ML
SODIUM SERPL-SCNC: 143 MMOL/L
T3 SERPL-MCNC: 102 NG/DL
T3FREE SERPL-MCNC: 2.61 PG/ML
T4 FREE SERPL-MCNC: 1.2 NG/DL
T4 SERPL-MCNC: 8.7 UG/DL
TRIGL SERPL-MCNC: 137 MG/DL
TSH SERPL-ACNC: 3.82 UIU/ML
VIT B12 SERPL-MCNC: 881 PG/ML

## 2023-04-04 PROCEDURE — ZZZZZ: CPT

## 2023-04-04 PROCEDURE — 93880 EXTRACRANIAL BILAT STUDY: CPT

## 2023-04-04 PROCEDURE — 93306 TTE W/DOPPLER COMPLETE: CPT

## 2023-04-04 PROCEDURE — 99214 OFFICE O/P EST MOD 30 MIN: CPT

## 2023-04-04 PROCEDURE — 93000 ELECTROCARDIOGRAM COMPLETE: CPT

## 2023-04-04 NOTE — DISCUSSION/SUMMARY
[Hyperlipidemia] : hyperlipidemia [Diet Modification] : diet modification [Mitral Regurgitation] : mitral regurgitation [Stable] : stable [None] : There are no changes in medication management [Sodium Restriction] : sodium restriction [Patient] : the patient [FreeTextEntry1] : Carotid artery disease- Stable; no further intervention at this time. \par Elevated PSA - Blood work reviewed with patient. Maintain a low caloric, low sodium, low cholesterol diet. Dietary counseling given, diet and exercise discussed in detail.

## 2023-04-04 NOTE — PHYSICAL EXAM

## 2023-04-04 NOTE — HISTORY OF PRESENT ILLNESS
[FreeTextEntry1] : Denies Chest Pain, SOB, Dizziness, Leg edema, Orthopnea, PND, Palpitations, Syncope, JOSHI, Diaphoresis. \par Patient denies change in appetite, fatigue, heat/cold intolerance, myalgias, polydipsia, polyphagia, polyuria, tingling, numbness. \par HLD / Mitral regurgitation - Echo ordered as routine surveillance (>1yr.) of moderate or severe valvular regurgitation without change in clinical status or cardiac exam (Echo AUC criteria -J.Am Soc of Echo 2011: 24:236)

## 2023-04-04 NOTE — END OF VISIT
[Time Spent: ___ minutes] : I have spent [unfilled] minutes of time on the encounter. [FreeTextEntry3] : All medical record entries made by the Scribe were at my, Dr. Graham Torrez, direction and personally dictated by me on 04/04/2023. I have reviewed the chart and agree that the record accurately reflects my personal performance of the history, physical exam, assessment and plan. I have also personally directed, reviewed, and agreed with the chart.

## 2023-04-14 ENCOUNTER — RX RENEWAL (OUTPATIENT)
Age: 73
End: 2023-04-14

## 2023-04-15 RX ORDER — FINASTERIDE 5 MG/1
5 TABLET, FILM COATED ORAL
Qty: 90 | Refills: 3 | Status: ACTIVE | COMMUNITY
Start: 2019-11-30 | End: 1900-01-01

## 2023-04-19 LAB
BASOPHILS # BLD AUTO: 0.35 K/UL
BASOPHILS NFR BLD AUTO: 2 %
EOSINOPHIL # BLD AUTO: 0.18 K/UL
EOSINOPHIL NFR BLD AUTO: 1 %
HCT VFR BLD CALC: 44.3 %
HGB BLD-MCNC: 13.5 G/DL
LYMPHOCYTES # BLD AUTO: 12.94 K/UL
LYMPHOCYTES NFR BLD AUTO: 73 %
MAN DIFF?: NORMAL
MCHC RBC-ENTMCNC: 29.1 PG
MCHC RBC-ENTMCNC: 30.5 GM/DL
MCV RBC AUTO: 95.5 FL
MONOCYTES # BLD AUTO: 1.06 K/UL
MONOCYTES NFR BLD AUTO: 6 %
NEUTROPHILS # BLD AUTO: 3.19 K/UL
NEUTROPHILS NFR BLD AUTO: 18 %
PLATELET # BLD AUTO: 111 K/UL
RBC # BLD: 4.64 M/UL
RBC # FLD: 14.7 %
WBC # FLD AUTO: 17.72 K/UL

## 2023-10-17 ENCOUNTER — LABORATORY RESULT (OUTPATIENT)
Age: 73
End: 2023-10-17

## 2023-10-17 ENCOUNTER — APPOINTMENT (OUTPATIENT)
Dept: HEART AND VASCULAR | Facility: CLINIC | Age: 73
End: 2023-10-17
Payer: MEDICARE

## 2023-10-17 DIAGNOSIS — R06.02 SHORTNESS OF BREATH: ICD-10-CM

## 2023-10-17 DIAGNOSIS — N40.0 BENIGN PROSTATIC HYPERPLASIA WITHOUT LOWER URINARY TRACT SYMPMS: ICD-10-CM

## 2023-10-17 PROCEDURE — 36415 COLL VENOUS BLD VENIPUNCTURE: CPT

## 2023-10-18 LAB
25(OH)D3 SERPL-MCNC: 49.2 NG/ML
ALBUMIN SERPL ELPH-MCNC: 4.8 G/DL
ALP BLD-CCNC: 83 U/L
ALT SERPL-CCNC: 13 U/L
ANION GAP SERPL CALC-SCNC: 13 MMOL/L
AST SERPL-CCNC: 12 U/L
BASOPHILS # BLD AUTO: 0 K/UL
BASOPHILS NFR BLD AUTO: 0 %
BILIRUB SERPL-MCNC: 0.5 MG/DL
BUN SERPL-MCNC: 21 MG/DL
CALCIUM SERPL-MCNC: 9.7 MG/DL
CHLORIDE SERPL-SCNC: 103 MMOL/L
CHOLEST SERPL-MCNC: 211 MG/DL
CO2 SERPL-SCNC: 26 MMOL/L
COVID-19 SPIKE DOMAIN ANTIBODY INTERPRETATION: POSITIVE
CREAT SERPL-MCNC: 1.13 MG/DL
EGFR: 68 ML/MIN/1.73M2
EOSINOPHIL # BLD AUTO: 0.32 K/UL
EOSINOPHIL NFR BLD AUTO: 0.9 %
ESTIMATED AVERAGE GLUCOSE: 117 MG/DL
FOLATE SERPL-MCNC: >20 NG/ML
GLUCOSE SERPL-MCNC: 117 MG/DL
HBA1C MFR BLD HPLC: 5.7 %
HCT VFR BLD CALC: 41.3 %
HDLC SERPL-MCNC: 35 MG/DL
HGB BLD-MCNC: 12.7 G/DL
LDLC SERPL CALC-MCNC: 147 MG/DL
LYMPHOCYTES # BLD AUTO: 26.24 K/UL
LYMPHOCYTES NFR BLD AUTO: 73.9 %
MAN DIFF?: NORMAL
MCHC RBC-ENTMCNC: 30.5 PG
MCHC RBC-ENTMCNC: 30.8 GM/DL
MCV RBC AUTO: 99 FL
MONOCYTES # BLD AUTO: 2.24 K/UL
MONOCYTES NFR BLD AUTO: 6.3 %
NEUTROPHILS # BLD AUTO: 4.79 K/UL
NEUTROPHILS NFR BLD AUTO: 13.5 %
NONHDLC SERPL-MCNC: 176 MG/DL
PLATELET # BLD AUTO: 108 K/UL
POTASSIUM SERPL-SCNC: 4.5 MMOL/L
PROT SERPL-MCNC: 6.5 G/DL
PSA FREE FLD-MCNC: 16 %
PSA FREE SERPL-MCNC: 0.41 NG/ML
PSA SERPL-MCNC: 2.57 NG/ML
RBC # BLD: 4.17 M/UL
RBC # FLD: 14.8 %
SARS-COV-2 AB SERPL IA-ACNC: >250 U/ML
SODIUM SERPL-SCNC: 142 MMOL/L
T3 SERPL-MCNC: 103 NG/DL
T3FREE SERPL-MCNC: 2.91 PG/ML
T4 FREE SERPL-MCNC: 1.4 NG/DL
T4 SERPL-MCNC: 9.7 UG/DL
TRIGL SERPL-MCNC: 154 MG/DL
TSH SERPL-ACNC: 4.49 UIU/ML
VIT B12 SERPL-MCNC: 1005 PG/ML
WBC # FLD AUTO: 35.51 K/UL

## 2023-10-24 ENCOUNTER — APPOINTMENT (OUTPATIENT)
Dept: HEART AND VASCULAR | Facility: CLINIC | Age: 73
End: 2023-10-24
Payer: MEDICARE

## 2023-10-24 ENCOUNTER — NON-APPOINTMENT (OUTPATIENT)
Age: 73
End: 2023-10-24

## 2023-10-24 VITALS
SYSTOLIC BLOOD PRESSURE: 110 MMHG | BODY MASS INDEX: 32.94 KG/M2 | HEART RATE: 100 BPM | HEIGHT: 62 IN | DIASTOLIC BLOOD PRESSURE: 75 MMHG | WEIGHT: 179 LBS

## 2023-10-24 DIAGNOSIS — Z23 ENCOUNTER FOR IMMUNIZATION: ICD-10-CM

## 2023-10-24 DIAGNOSIS — I34.0 NONRHEUMATIC MITRAL (VALVE) INSUFFICIENCY: ICD-10-CM

## 2023-10-24 DIAGNOSIS — Z20.822 CONTACT WITH AND (SUSPECTED) EXPOSURE TO COVID-19: ICD-10-CM

## 2023-10-24 PROCEDURE — 99214 OFFICE O/P EST MOD 30 MIN: CPT

## 2023-10-24 PROCEDURE — G0008: CPT

## 2023-10-24 PROCEDURE — 93000 ELECTROCARDIOGRAM COMPLETE: CPT

## 2023-10-24 PROCEDURE — 90686 IIV4 VACC NO PRSV 0.5 ML IM: CPT

## 2023-11-29 NOTE — ED ADULT TRIAGE NOTE - CCCP TRG CHIEF CMPLNT
[address] EMERGENCY DEP  EMERGENCY DEPARTMENT ENCOUNTER      Pt Name: Maria Alejandra Maxwell  MRN: [id number]  [facility] 1987  Date of evaluation: 11/28/2023  Provider: Jenny Garcia       Chief Complaint   Patient presents with    Hypertension         HISTORY OF PRESENT ILLNESS   (Location/Symptom, Timing/Onset, Context/Setting, Quality, Duration, Modifying Factors, Severity)  Note limiting factors. 39 y.o. male presents to ED with lightheadedness and high BP. Patient reports that for the past 4 days he has had intermittent episodes of lightheadedness, headaches and diaphoresis. He took his blood pressure at home and it was elevated at 578 systolic. Patient denies any dizziness, vision changes, CP, SOB, weakness. Patient is not established with a PCP and is not currently on any blood pressure medications. Review of External Medical Records:     Nursing Notes were reviewed. REVIEW OF SYSTEMS    (2-9 systems for level 4, 10 or more for level 5)     Review of Systems   Constitutional:  Negative for chills and fever. HENT:  Negative for congestion, ear pain and sore throat. Eyes:  Negative for pain. Respiratory:  Negative for cough and shortness of breath. Cardiovascular:  Negative for chest pain. Gastrointestinal:  Negative for abdominal pain, diarrhea, nausea and vomiting. Genitourinary:  Negative for dysuria and flank pain. Musculoskeletal:  Negative for myalgias. Skin:  Negative for rash. Neurological:  Positive for light-headedness. Negative for dizziness and headaches. Hematological:  Negative for adenopathy. Except as noted above the remainder of the review of systems was reviewed and negative. PAST MEDICAL HISTORY   History reviewed. No pertinent past medical history. SURGICAL HISTORY     History reviewed. No pertinent surgical history.       CURRENT MEDICATIONS       There are no discharge medications for this shortness of breath

## 2024-04-01 ENCOUNTER — RX RENEWAL (OUTPATIENT)
Age: 74
End: 2024-04-01

## 2024-04-01 RX ORDER — TAMSULOSIN HYDROCHLORIDE 0.4 MG/1
0.4 CAPSULE ORAL
Qty: 90 | Refills: 3 | Status: ACTIVE | COMMUNITY
Start: 2018-06-04 | End: 1900-01-01

## 2024-04-18 ENCOUNTER — RX RENEWAL (OUTPATIENT)
Age: 74
End: 2024-04-18

## 2024-04-18 RX ORDER — PAROXETINE HYDROCHLORIDE 20 MG/1
20 TABLET, FILM COATED ORAL DAILY
Qty: 90 | Refills: 3 | Status: ACTIVE | COMMUNITY
Start: 2018-06-04 | End: 1900-01-01

## 2024-04-26 ENCOUNTER — APPOINTMENT (OUTPATIENT)
Dept: HEART AND VASCULAR | Facility: CLINIC | Age: 74
End: 2024-04-26
Payer: MEDICARE

## 2024-04-26 ENCOUNTER — LABORATORY RESULT (OUTPATIENT)
Age: 74
End: 2024-04-26

## 2024-04-26 DIAGNOSIS — Z00.00 ENCOUNTER FOR GENERAL ADULT MEDICAL EXAMINATION W/OUT ABNORMAL FINDINGS: ICD-10-CM

## 2024-04-26 DIAGNOSIS — E78.5 HYPERLIPIDEMIA, UNSPECIFIED: ICD-10-CM

## 2024-04-26 PROCEDURE — 36415 COLL VENOUS BLD VENIPUNCTURE: CPT

## 2024-04-29 ENCOUNTER — APPOINTMENT (OUTPATIENT)
Dept: HEART AND VASCULAR | Facility: CLINIC | Age: 74
End: 2024-04-29

## 2024-05-02 LAB
25(OH)D3 SERPL-MCNC: 40.7 NG/ML
ALBUMIN SERPL ELPH-MCNC: 4.8 G/DL
ALP BLD-CCNC: 78 U/L
ALT SERPL-CCNC: 13 U/L
ANION GAP SERPL CALC-SCNC: 13 MMOL/L
AST SERPL-CCNC: 16 U/L
BASOPHILS # BLD AUTO: 0.15 K/UL
BASOPHILS NFR BLD AUTO: 0.3 %
BILIRUB SERPL-MCNC: 0.4 MG/DL
BUN SERPL-MCNC: 22 MG/DL
CALCIUM SERPL-MCNC: 9.4 MG/DL
CHLORIDE SERPL-SCNC: 105 MMOL/L
CHOLEST SERPL-MCNC: 191 MG/DL
CO2 SERPL-SCNC: 26 MMOL/L
COVID-19 SPIKE DOMAIN ANTIBODY INTERPRETATION: POSITIVE
CREAT SERPL-MCNC: 1.03 MG/DL
EGFR: 76 ML/MIN/1.73M2
EOSINOPHIL # BLD AUTO: 0.16 K/UL
EOSINOPHIL NFR BLD AUTO: 0.3 %
ESTIMATED AVERAGE GLUCOSE: 117 MG/DL
FOLATE SERPL-MCNC: >20 NG/ML
GLUCOSE SERPL-MCNC: 103 MG/DL
HBA1C MFR BLD HPLC: 5.7 %
HCT VFR BLD CALC: 41.1 %
HDLC SERPL-MCNC: 33 MG/DL
HGB BLD-MCNC: 12.8 G/DL
IMM GRANULOCYTES NFR BLD AUTO: 0.2 %
LDLC SERPL CALC-MCNC: 128 MG/DL
LYMPHOCYTES # BLD AUTO: 40.74 K/UL
LYMPHOCYTES NFR BLD AUTO: 87.4 %
MAN DIFF?: NORMAL
MCHC RBC-ENTMCNC: 30.8 PG
MCHC RBC-ENTMCNC: 31.1 GM/DL
MCV RBC AUTO: 98.8 FL
MONOCYTES # BLD AUTO: 1.19 K/UL
MONOCYTES NFR BLD AUTO: 2.6 %
NEUTROPHILS # BLD AUTO: 4.28 K/UL
NEUTROPHILS NFR BLD AUTO: 9.2 %
NONHDLC SERPL-MCNC: 158 MG/DL
PLATELET # BLD AUTO: 76 K/UL
POTASSIUM SERPL-SCNC: 4.3 MMOL/L
PROT SERPL-MCNC: 6.4 G/DL
PSA FREE FLD-MCNC: 17 %
PSA FREE SERPL-MCNC: 0.31 NG/ML
PSA SERPL-MCNC: 1.86 NG/ML
RBC # BLD: 4.16 M/UL
RBC # FLD: 15.3 %
SARS-COV-2 AB SERPL IA-ACNC: >250 U/ML
SODIUM SERPL-SCNC: 143 MMOL/L
T3 SERPL-MCNC: 116 NG/DL
T3FREE SERPL-MCNC: 2.63 PG/ML
T3RU NFR SERPL: 1.1 TBI
T4 FREE SERPL-MCNC: 1.2 NG/DL
T4 SERPL-MCNC: 8.3 UG/DL
TRIGL SERPL-MCNC: 167 MG/DL
TSH SERPL-ACNC: 3.28 UIU/ML
VIT B12 SERPL-MCNC: 936 PG/ML
WBC # FLD AUTO: 46.59 K/UL

## 2024-05-07 ENCOUNTER — APPOINTMENT (OUTPATIENT)
Dept: HEART AND VASCULAR | Facility: CLINIC | Age: 74
End: 2024-05-07

## 2024-07-08 ENCOUNTER — NON-APPOINTMENT (OUTPATIENT)
Age: 74
End: 2024-07-08

## 2024-07-08 ENCOUNTER — APPOINTMENT (OUTPATIENT)
Dept: HEART AND VASCULAR | Facility: CLINIC | Age: 74
End: 2024-07-08
Payer: MEDICARE

## 2024-07-08 VITALS
DIASTOLIC BLOOD PRESSURE: 80 MMHG | HEIGHT: 62 IN | BODY MASS INDEX: 32.39 KG/M2 | HEART RATE: 97 BPM | SYSTOLIC BLOOD PRESSURE: 120 MMHG | WEIGHT: 176 LBS

## 2024-07-08 DIAGNOSIS — R97.20 ELEVATED PROSTATE, SPECIFIC ANTIGEN [PSA]: ICD-10-CM

## 2024-07-08 DIAGNOSIS — I34.0 NONRHEUMATIC MITRAL (VALVE) INSUFFICIENCY: ICD-10-CM

## 2024-07-08 DIAGNOSIS — E78.5 HYPERLIPIDEMIA, UNSPECIFIED: ICD-10-CM

## 2024-07-08 DIAGNOSIS — R73.03 PREDIABETES.: ICD-10-CM

## 2024-07-08 DIAGNOSIS — I65.23 OCCLUSION AND STENOSIS OF BILATERAL CAROTID ARTERIES: ICD-10-CM

## 2024-07-08 PROCEDURE — 99214 OFFICE O/P EST MOD 30 MIN: CPT

## 2024-07-08 PROCEDURE — 93306 TTE W/DOPPLER COMPLETE: CPT

## 2024-07-08 PROCEDURE — 93000 ELECTROCARDIOGRAM COMPLETE: CPT

## 2024-07-08 PROCEDURE — 93880 EXTRACRANIAL BILAT STUDY: CPT

## 2024-07-08 PROCEDURE — G2211 COMPLEX E/M VISIT ADD ON: CPT

## 2024-07-08 RX ORDER — ZANUBRUTINIB 80 MG/1
80 CAPSULE, GELATIN COATED ORAL
Refills: 0 | Status: ACTIVE | COMMUNITY

## 2024-07-30 DIAGNOSIS — K59.00 CONSTIPATION, UNSPECIFIED: ICD-10-CM

## 2024-07-30 DIAGNOSIS — R09.89 OTHER SPECIFIED SYMPTOMS AND SIGNS INVOLVING THE CIRCULATORY AND RESPIRATORY SYSTEMS: ICD-10-CM

## 2024-07-31 RX ORDER — DOCUSATE SODIUM 100 MG/1
100 CAPSULE ORAL TWICE DAILY
Qty: 30 | Refills: 5 | Status: ACTIVE | COMMUNITY
Start: 2024-07-30 | End: 1900-01-01

## 2024-08-20 NOTE — ADDENDUM
[FreeTextEntry1] : Scribed by Chigozirim Ibeh for , 04/04/202312:55 PM
For information on Fall & Injury Prevention, visit: [email or web address] OR  [email or web address] OR  https://www.cdc.gov/steadi/patient.html

## 2025-01-21 ENCOUNTER — APPOINTMENT (OUTPATIENT)
Dept: HEART AND VASCULAR | Facility: CLINIC | Age: 75
End: 2025-01-21
Payer: MEDICARE

## 2025-01-21 ENCOUNTER — LABORATORY RESULT (OUTPATIENT)
Age: 75
End: 2025-01-21

## 2025-01-21 PROCEDURE — 36415 COLL VENOUS BLD VENIPUNCTURE: CPT

## 2025-01-22 LAB
25(OH)D3 SERPL-MCNC: 33.2 NG/ML
ALBUMIN SERPL ELPH-MCNC: 4.5 G/DL
ALP BLD-CCNC: 71 U/L
ALT SERPL-CCNC: 11 U/L
ANION GAP SERPL CALC-SCNC: 12 MMOL/L
AST SERPL-CCNC: 11 U/L
BASOPHILS # BLD AUTO: 0 K/UL
BASOPHILS NFR BLD AUTO: 0 %
BILIRUB SERPL-MCNC: 0.6 MG/DL
BUN SERPL-MCNC: 21 MG/DL
CALCIUM SERPL-MCNC: 9.5 MG/DL
CHLORIDE SERPL-SCNC: 103 MMOL/L
CHOLEST SERPL-MCNC: 154 MG/DL
CO2 SERPL-SCNC: 26 MMOL/L
CREAT SERPL-MCNC: 0.96 MG/DL
EGFR: 82 ML/MIN/1.73M2
EOSINOPHIL # BLD AUTO: 0.02 K/UL
EOSINOPHIL NFR BLD AUTO: 0.9 %
ESTIMATED AVERAGE GLUCOSE: 88 MG/DL
FOLATE SERPL-MCNC: 18.5 NG/ML
GLUCOSE SERPL-MCNC: 122 MG/DL
HBA1C MFR BLD HPLC: 4.7 %
HCT VFR BLD CALC: 40 %
HDLC SERPL-MCNC: 33 MG/DL
HGB BLD-MCNC: 13.1 G/DL
IMM GRANULOCYTES NFR BLD AUTO: 0 %
LDLC SERPL CALC-MCNC: 77 MG/DL
LYMPHOCYTES # BLD AUTO: 0.56 K/UL
LYMPHOCYTES NFR BLD AUTO: 26 %
MAN DIFF?: NORMAL
MCHC RBC-ENTMCNC: 30.3 PG
MCHC RBC-ENTMCNC: 32.8 G/DL
MCV RBC AUTO: 92.4 FL
MONOCYTES # BLD AUTO: 0.42 K/UL
MONOCYTES NFR BLD AUTO: 19.5 %
NEUTROPHILS # BLD AUTO: 1.15 K/UL
NEUTROPHILS NFR BLD AUTO: 53.6 %
NONHDLC SERPL-MCNC: 121 MG/DL
PLATELET # BLD AUTO: NORMAL K/UL
POTASSIUM SERPL-SCNC: 4.1 MMOL/L
PROT SERPL-MCNC: 6.4 G/DL
PSA FREE FLD-MCNC: 16 %
PSA FREE SERPL-MCNC: 0.27 NG/ML
PSA SERPL-MCNC: 1.75 NG/ML
RBC # BLD: 4.33 M/UL
RBC # FLD: 15.2 %
SODIUM SERPL-SCNC: 141 MMOL/L
T3 SERPL-MCNC: 101 NG/DL
T3FREE SERPL-MCNC: 2.91 PG/ML
T4 FREE SERPL-MCNC: 1.2 NG/DL
T4 SERPL-MCNC: 8 UG/DL
TRIGL SERPL-MCNC: 267 MG/DL
TSH SERPL-ACNC: 4.11 UIU/ML
VIT B12 SERPL-MCNC: 746 PG/ML
WBC # FLD AUTO: 2.15 K/UL

## 2025-01-27 ENCOUNTER — NON-APPOINTMENT (OUTPATIENT)
Age: 75
End: 2025-01-27

## 2025-01-27 ENCOUNTER — APPOINTMENT (OUTPATIENT)
Dept: HEART AND VASCULAR | Facility: CLINIC | Age: 75
End: 2025-01-27
Payer: MEDICARE

## 2025-01-27 VITALS
RESPIRATION RATE: 14 BRPM | WEIGHT: 171 LBS | BODY MASS INDEX: 31.47 KG/M2 | HEIGHT: 62 IN | SYSTOLIC BLOOD PRESSURE: 122 MMHG | DIASTOLIC BLOOD PRESSURE: 85 MMHG | HEART RATE: 120 BPM

## 2025-01-27 DIAGNOSIS — R73.03 PREDIABETES.: ICD-10-CM

## 2025-01-27 DIAGNOSIS — R97.20 ELEVATED PROSTATE, SPECIFIC ANTIGEN [PSA]: ICD-10-CM

## 2025-01-27 DIAGNOSIS — E78.5 HYPERLIPIDEMIA, UNSPECIFIED: ICD-10-CM

## 2025-01-27 DIAGNOSIS — I34.0 NONRHEUMATIC MITRAL (VALVE) INSUFFICIENCY: ICD-10-CM

## 2025-01-27 DIAGNOSIS — I49.9 CARDIAC ARRHYTHMIA, UNSPECIFIED: ICD-10-CM

## 2025-01-27 PROCEDURE — 99214 OFFICE O/P EST MOD 30 MIN: CPT

## 2025-01-27 PROCEDURE — 93000 ELECTROCARDIOGRAM COMPLETE: CPT

## 2025-01-27 PROCEDURE — G2211 COMPLEX E/M VISIT ADD ON: CPT

## 2025-01-27 RX ORDER — METOPROLOL SUCCINATE 25 MG/1
25 TABLET, EXTENDED RELEASE ORAL DAILY
Qty: 90 | Refills: 3 | Status: ACTIVE | COMMUNITY
Start: 2025-01-27 | End: 1900-01-01

## 2025-01-27 RX ORDER — ALLOPURINOL 300 MG/1
300 TABLET ORAL DAILY
Refills: 0 | Status: ACTIVE | COMMUNITY

## 2025-01-27 RX ORDER — VENETOCLAX 100 MG/1
100 TABLET, FILM COATED ORAL EVERY 6 HOURS
Qty: 120 | Refills: 0 | Status: ACTIVE | COMMUNITY
Start: 2025-01-27

## 2025-04-08 NOTE — CONSULT NOTE ADULT - NSTELEHEALTH_GEN_ALL_CORE
"Chief Complaint   Patient presents with    Jaw Pain    Otalgia       Initial /74   Pulse 65   Temp 98  F (36.7  C) (Tympanic)   Resp 18   Wt 75.4 kg (166 lb 3.2 oz)   LMP  (LMP Unknown)   SpO2 97%   BMI 26.03 kg/m   Estimated body mass index is 26.03 kg/m  as calculated from the following:    Height as of 2/24/25: 1.702 m (5' 7\").    Weight as of this encounter: 75.4 kg (166 lb 3.2 oz).  Medication Review: complete    The next two questions are to help us understand your food security.  If you are feeling you need any assistance in this area, we have resources available to support you today.           No data to display                  Health Care Directive:  Patient does not have a Health Care Directive: Discussed advance care planning with patient; however, patient declined at this time.    Jina Nelson LPN      " No

## 2025-05-23 ENCOUNTER — LABORATORY RESULT (OUTPATIENT)
Age: 75
End: 2025-05-23

## 2025-05-23 ENCOUNTER — APPOINTMENT (OUTPATIENT)
Dept: HEART AND VASCULAR | Facility: CLINIC | Age: 75
End: 2025-05-23
Payer: MEDICARE

## 2025-05-23 PROCEDURE — 36415 COLL VENOUS BLD VENIPUNCTURE: CPT

## 2025-05-27 ENCOUNTER — NON-APPOINTMENT (OUTPATIENT)
Age: 75
End: 2025-05-27

## 2025-05-27 ENCOUNTER — APPOINTMENT (OUTPATIENT)
Dept: HEART AND VASCULAR | Facility: CLINIC | Age: 75
End: 2025-05-27
Payer: MEDICARE

## 2025-05-27 VITALS
DIASTOLIC BLOOD PRESSURE: 89 MMHG | WEIGHT: 179 LBS | HEART RATE: 79 BPM | OXYGEN SATURATION: 97 % | SYSTOLIC BLOOD PRESSURE: 120 MMHG | BODY MASS INDEX: 32.94 KG/M2 | HEIGHT: 62 IN

## 2025-05-27 DIAGNOSIS — I34.0 NONRHEUMATIC MITRAL (VALVE) INSUFFICIENCY: ICD-10-CM

## 2025-05-27 DIAGNOSIS — R73.03 PREDIABETES.: ICD-10-CM

## 2025-05-27 DIAGNOSIS — I65.23 OCCLUSION AND STENOSIS OF BILATERAL CAROTID ARTERIES: ICD-10-CM

## 2025-05-27 DIAGNOSIS — R97.20 ELEVATED PROSTATE, SPECIFIC ANTIGEN [PSA]: ICD-10-CM

## 2025-05-27 DIAGNOSIS — J30.2 OTHER SEASONAL ALLERGIC RHINITIS: ICD-10-CM

## 2025-05-27 DIAGNOSIS — E78.5 HYPERLIPIDEMIA, UNSPECIFIED: ICD-10-CM

## 2025-05-27 DIAGNOSIS — R06.02 SHORTNESS OF BREATH: ICD-10-CM

## 2025-05-27 DIAGNOSIS — K21.9 GASTRO-ESOPHAGEAL REFLUX DISEASE W/OUT ESOPHAGITIS: ICD-10-CM

## 2025-05-27 LAB
25(OH)D3 SERPL-MCNC: 32.7 NG/ML
ALBUMIN SERPL ELPH-MCNC: 4.5 G/DL
ALP BLD-CCNC: 59 U/L
ALT SERPL-CCNC: 25 U/L
ANION GAP SERPL CALC-SCNC: 14 MMOL/L
AST SERPL-CCNC: 17 U/L
BASOPHILS # BLD AUTO: 0.01 K/UL
BASOPHILS NFR BLD AUTO: 0.3 %
BILIRUB SERPL-MCNC: 0.6 MG/DL
BUN SERPL-MCNC: 17 MG/DL
CALCIUM SERPL-MCNC: 9.2 MG/DL
CHLORIDE SERPL-SCNC: 101 MMOL/L
CHOLEST SERPL-MCNC: 192 MG/DL
CO2 SERPL-SCNC: 27 MMOL/L
CREAT SERPL-MCNC: 0.91 MG/DL
EGFRCR SERPLBLD CKD-EPI 2021: 88 ML/MIN/1.73M2
EOSINOPHIL # BLD AUTO: 0.04 K/UL
EOSINOPHIL NFR BLD AUTO: 1.1 %
ESTIMATED AVERAGE GLUCOSE: 103 MG/DL
FOLATE SERPL-MCNC: >20 NG/ML
GLUCOSE SERPL-MCNC: 109 MG/DL
HBA1C MFR BLD HPLC: 5.2 %
HCT VFR BLD CALC: 42 %
HDLC SERPL-MCNC: 39 MG/DL
HGB BLD-MCNC: 13.7 G/DL
IMM GRANULOCYTES NFR BLD AUTO: 3.3 %
LDLC SERPL-MCNC: 111 MG/DL
LYMPHOCYTES # BLD AUTO: 0.43 K/UL
LYMPHOCYTES NFR BLD AUTO: 11.8 %
MAN DIFF?: NORMAL
MCHC RBC-ENTMCNC: 30 PG
MCHC RBC-ENTMCNC: 32.6 G/DL
MCV RBC AUTO: 91.9 FL
MONOCYTES # BLD AUTO: 0.47 K/UL
MONOCYTES NFR BLD AUTO: 12.9 %
NEUTROPHILS # BLD AUTO: 2.57 K/UL
NEUTROPHILS NFR BLD AUTO: 70.6 %
NONHDLC SERPL-MCNC: 153 MG/DL
PLATELET # BLD AUTO: 79 K/UL
POTASSIUM SERPL-SCNC: 4.1 MMOL/L
PROT SERPL-MCNC: 6.2 G/DL
RBC # BLD: 4.57 M/UL
RBC # FLD: 14.7 %
SODIUM SERPL-SCNC: 142 MMOL/L
T3 SERPL-MCNC: 110 NG/DL
T3FREE SERPL-MCNC: 3.07 PG/ML
T3RU NFR SERPL: 1.1 TBI
T4 FREE SERPL-MCNC: 1.1 NG/DL
T4 SERPL-MCNC: 8 UG/DL
TRIGL SERPL-MCNC: 247 MG/DL
TSH SERPL-ACNC: 3.37 UIU/ML
VIT B12 SERPL-MCNC: 1107 PG/ML
WBC # FLD AUTO: 3.64 K/UL

## 2025-05-27 PROCEDURE — G2211 COMPLEX E/M VISIT ADD ON: CPT

## 2025-05-27 PROCEDURE — 93880 EXTRACRANIAL BILAT STUDY: CPT

## 2025-05-27 PROCEDURE — ZZZZZ: CPT

## 2025-05-27 PROCEDURE — 93306 TTE W/DOPPLER COMPLETE: CPT

## 2025-05-27 PROCEDURE — 93000 ELECTROCARDIOGRAM COMPLETE: CPT

## 2025-05-27 PROCEDURE — 99214 OFFICE O/P EST MOD 30 MIN: CPT

## 2025-05-27 RX ORDER — LORATADINE 10 MG/1
10 TABLET ORAL
Qty: 90 | Refills: 3 | Status: ACTIVE | COMMUNITY
Start: 2025-05-27 | End: 1900-01-01

## 2025-05-27 RX ORDER — PANTOPRAZOLE 40 MG/1
40 TABLET, DELAYED RELEASE ORAL DAILY
Qty: 30 | Refills: 3 | Status: ACTIVE | COMMUNITY
Start: 2025-05-27 | End: 1900-01-01

## 2025-06-27 ENCOUNTER — INPATIENT (INPATIENT)
Facility: HOSPITAL | Age: 75
LOS: 5 days | Discharge: HOME | DRG: 547 | End: 2025-07-03
Attending: THORACIC SURGERY (CARDIOTHORACIC VASCULAR SURGERY) | Admitting: THORACIC SURGERY (CARDIOTHORACIC VASCULAR SURGERY)
Payer: MEDICARE

## 2025-06-27 VITALS
DIASTOLIC BLOOD PRESSURE: 80 MMHG | HEIGHT: 63 IN | RESPIRATION RATE: 18 BRPM | HEART RATE: 82 BPM | OXYGEN SATURATION: 97 % | SYSTOLIC BLOOD PRESSURE: 119 MMHG | WEIGHT: 149.91 LBS | TEMPERATURE: 97 F

## 2025-06-27 DIAGNOSIS — C91.10 CHRONIC LYMPHOCYTIC LEUKEMIA OF B-CELL TYPE NOT HAVING ACHIEVED REMISSION: ICD-10-CM

## 2025-06-27 DIAGNOSIS — M31.6 OTHER GIANT CELL ARTERITIS: ICD-10-CM

## 2025-06-27 DIAGNOSIS — Z98.890 OTHER SPECIFIED POSTPROCEDURAL STATES: Chronic | ICD-10-CM

## 2025-06-27 DIAGNOSIS — Z87.438 PERSONAL HISTORY OF OTHER DISEASES OF MALE GENITAL ORGANS: ICD-10-CM

## 2025-06-27 LAB
ALBUMIN SERPL ELPH-MCNC: 4.3 G/DL — SIGNIFICANT CHANGE UP (ref 3.3–5)
ALP SERPL-CCNC: 78 U/L — SIGNIFICANT CHANGE UP (ref 40–120)
ALT FLD-CCNC: 23 U/L — SIGNIFICANT CHANGE UP (ref 10–45)
ANION GAP SERPL CALC-SCNC: 17 MMOL/L — SIGNIFICANT CHANGE UP (ref 5–17)
APTT BLD: 32.8 SEC — SIGNIFICANT CHANGE UP (ref 26.1–36.8)
AST SERPL-CCNC: 19 U/L — SIGNIFICANT CHANGE UP (ref 10–40)
BASOPHILS # BLD AUTO: 0.01 K/UL — SIGNIFICANT CHANGE UP (ref 0–0.2)
BASOPHILS NFR BLD AUTO: 0.3 % — SIGNIFICANT CHANGE UP (ref 0–2)
BILIRUB SERPL-MCNC: 0.3 MG/DL — SIGNIFICANT CHANGE UP (ref 0.2–1.2)
BUN SERPL-MCNC: 20 MG/DL — SIGNIFICANT CHANGE UP (ref 7–23)
CALCIUM SERPL-MCNC: 9.8 MG/DL — SIGNIFICANT CHANGE UP (ref 8.4–10.5)
CHLORIDE SERPL-SCNC: 100 MMOL/L — SIGNIFICANT CHANGE UP (ref 96–108)
CO2 SERPL-SCNC: 22 MMOL/L — SIGNIFICANT CHANGE UP (ref 22–31)
CREAT SERPL-MCNC: 0.96 MG/DL — SIGNIFICANT CHANGE UP (ref 0.5–1.3)
CRP SERPL-MCNC: 36 MG/L — HIGH (ref 0–4)
EGFR: 82 ML/MIN/1.73M2 — SIGNIFICANT CHANGE UP
EGFR: 82 ML/MIN/1.73M2 — SIGNIFICANT CHANGE UP
EOSINOPHIL # BLD AUTO: 0.02 K/UL — SIGNIFICANT CHANGE UP (ref 0–0.5)
EOSINOPHIL NFR BLD AUTO: 0.5 % — SIGNIFICANT CHANGE UP (ref 0–6)
GLUCOSE SERPL-MCNC: 99 MG/DL — SIGNIFICANT CHANGE UP (ref 70–99)
HCT VFR BLD CALC: 39.8 % — SIGNIFICANT CHANGE UP (ref 39–50)
HGB BLD-MCNC: 13.2 G/DL — SIGNIFICANT CHANGE UP (ref 13–17)
IMM GRANULOCYTES # BLD AUTO: 0.02 K/UL — SIGNIFICANT CHANGE UP (ref 0–0.07)
IMM GRANULOCYTES NFR BLD AUTO: 0.5 % — SIGNIFICANT CHANGE UP (ref 0–0.9)
INR BLD: 1.09 RATIO — SIGNIFICANT CHANGE UP (ref 0.85–1.16)
LYMPHOCYTES # BLD AUTO: 0.51 K/UL — LOW (ref 1–3.3)
LYMPHOCYTES NFR BLD AUTO: 13 % — SIGNIFICANT CHANGE UP (ref 13–44)
MCHC RBC-ENTMCNC: 30 PG — SIGNIFICANT CHANGE UP (ref 27–34)
MCHC RBC-ENTMCNC: 33.2 G/DL — SIGNIFICANT CHANGE UP (ref 32–36)
MCV RBC AUTO: 90.5 FL — SIGNIFICANT CHANGE UP (ref 80–100)
MONOCYTES # BLD AUTO: 0.65 K/UL — SIGNIFICANT CHANGE UP (ref 0–0.9)
MONOCYTES NFR BLD AUTO: 16.6 % — HIGH (ref 2–14)
NEUTROPHILS # BLD AUTO: 2.71 K/UL — SIGNIFICANT CHANGE UP (ref 1.8–7.4)
NEUTROPHILS NFR BLD AUTO: 69.1 % — SIGNIFICANT CHANGE UP (ref 43–77)
NRBC # BLD AUTO: 0 K/UL — SIGNIFICANT CHANGE UP (ref 0–0)
NRBC # FLD: 0 K/UL — SIGNIFICANT CHANGE UP (ref 0–0)
NRBC BLD AUTO-RTO: 0 /100 WBCS — SIGNIFICANT CHANGE UP (ref 0–0)
PLATELET # BLD AUTO: 142 K/UL — LOW (ref 150–400)
PMV BLD: 8.7 FL — SIGNIFICANT CHANGE UP (ref 7–13)
POTASSIUM SERPL-MCNC: 4 MMOL/L — SIGNIFICANT CHANGE UP (ref 3.5–5.3)
POTASSIUM SERPL-SCNC: 4 MMOL/L — SIGNIFICANT CHANGE UP (ref 3.5–5.3)
PROT SERPL-MCNC: 6.9 G/DL — SIGNIFICANT CHANGE UP (ref 6–8.3)
PROTHROM AB SERPL-ACNC: 12.5 SEC — SIGNIFICANT CHANGE UP (ref 9.9–13.4)
RBC # BLD: 4.4 M/UL — SIGNIFICANT CHANGE UP (ref 4.2–5.8)
RBC # FLD: 13.2 % — SIGNIFICANT CHANGE UP (ref 10.3–14.5)
SODIUM SERPL-SCNC: 139 MMOL/L — SIGNIFICANT CHANGE UP (ref 135–145)
WBC # BLD: 3.92 K/UL — SIGNIFICANT CHANGE UP (ref 3.8–10.5)
WBC # FLD AUTO: 3.92 K/UL — SIGNIFICANT CHANGE UP (ref 3.8–10.5)

## 2025-06-27 PROCEDURE — 99285 EMERGENCY DEPT VISIT HI MDM: CPT | Mod: FS

## 2025-06-27 PROCEDURE — 99223 1ST HOSP IP/OBS HIGH 75: CPT

## 2025-06-27 PROCEDURE — 85730 THROMBOPLASTIN TIME PARTIAL: CPT

## 2025-06-27 PROCEDURE — 86140 C-REACTIVE PROTEIN: CPT

## 2025-06-27 PROCEDURE — 93010 ELECTROCARDIOGRAM REPORT: CPT

## 2025-06-27 PROCEDURE — 85025 COMPLETE CBC W/AUTO DIFF WBC: CPT

## 2025-06-27 PROCEDURE — 85610 PROTHROMBIN TIME: CPT

## 2025-06-27 PROCEDURE — 80053 COMPREHEN METABOLIC PANEL: CPT

## 2025-06-27 RX ORDER — PAROXETINE HYDROCHLORIDE 20 MG/1
10 TABLET, FILM COATED ORAL DAILY
Refills: 0 | Status: DISCONTINUED | OUTPATIENT
Start: 2025-06-27 | End: 2025-07-02

## 2025-06-27 RX ORDER — FUROSEMIDE 10 MG/ML
20 INJECTION INTRAMUSCULAR; INTRAVENOUS DAILY
Refills: 0 | Status: DISCONTINUED | OUTPATIENT
Start: 2025-06-27 | End: 2025-07-02

## 2025-06-27 RX ORDER — FINASTERIDE 1 MG/1
5 TABLET, FILM COATED ORAL DAILY
Refills: 0 | Status: DISCONTINUED | OUTPATIENT
Start: 2025-06-27 | End: 2025-07-02

## 2025-06-27 RX ORDER — TAMSULOSIN HYDROCHLORIDE 0.4 MG/1
0.4 CAPSULE ORAL AT BEDTIME
Refills: 0 | Status: DISCONTINUED | OUTPATIENT
Start: 2025-06-27 | End: 2025-07-02

## 2025-06-27 RX ORDER — METHYLPREDNISOLONE ACETATE 80 MG/ML
1000 INJECTION, SUSPENSION INTRA-ARTICULAR; INTRALESIONAL; INTRAMUSCULAR; SOFT TISSUE EVERY 24 HOURS
Refills: 0 | Status: COMPLETED | OUTPATIENT
Start: 2025-06-27 | End: 2025-06-30

## 2025-06-27 RX ADMIN — Medication 3 MILLILITER(S): at 21:03

## 2025-06-27 RX ADMIN — METHYLPREDNISOLONE ACETATE 50 MILLIGRAM(S): 80 INJECTION, SUSPENSION INTRA-ARTICULAR; INTRALESIONAL; INTRAMUSCULAR; SOFT TISSUE at 21:12

## 2025-06-27 NOTE — CONSULT NOTE ADULT - SUBJECTIVE AND OBJECTIVE BOX
[facility] DEPARTMENT OF OPHTHALMOLOGY - INITIAL ADULT CONSULT  -----------------------------------------------------------------------------  Isidro Joe MD, PGY-2  Contact: TEAMS  -----------------------------------------------------------------------------    HPI:    Interval History: ***    PMH: ***  POcHx: denies surg/laser  FH: denies glc/amd  Social History: denies etoh/tobacco  Ophthalmic Medications: none  Allergies: NKDA    Review of Systems:  Constitutional: No fever, chills  Eyes: No blurry vision, flashes, floaters, FBS, erythema, discharge, double vision, OU  Neuro: No tremors  Cardiovascular: No chest pain, palpitations  Respiratory: No SOB, no cough  GI: No nausea, vomiting, abdominal pain  : No dysuria  Skin: no rash  Psych: no depression  Endocrine: no polyuria, polydipsia  Heme/lymph: no swelling    VITALS: T(C): 36.3 (06-27-25 @ 12:49)  T(F): 97.3 (06-27-25 @ 12:49), Max: 97.3 (06-27-25 @ 12:49)  HR: 82 (06-27-25 @ 12:49) (82 - 82)  BP: 119/80 (06-27-25 @ 12:49) (119/80 - 119/80)  RR:  (18 - 18)  SpO2:  (97% - 97%)  Wt(kg): --  General: AAO x 3, appropriate mood and affect    Ophthalmology Exam:  Visual acuity (sc): 20/20 OD, 20/20 OS  Pupils: PERRL OU, no RAPD  Ttono: 16 OD 16 OS  Extraocular movements (EOMs): Full OU, no pain, no diplopia  Confrontational Visual Field (CVF): Full OD, Full OS  Color Plates: 12/12 OD, 12/12 OS    Pen Light Exam (PLE)  External: Flat OU  Lids/Lashes/Lacrimal Ducts: Flat OU    Sclera/Conjunctiva: W+Q OU  Cornea: Cl OU  Anterior Chamber: D+F OU    Iris: Flat OU  Lens: Cl OU    Fundus Exam: dilated with 1% tropicamide and 2.5% phenylephrine  Approval obtained from primary team for dilation  Patient aware that pupils can remained dilated for at least 4-6 hours  Exam performed with 20D lens    Vitreous: wnl OU  Disc, cup/disc: sharp and pink, 0.4 OU  Macula: Flat OU  Vessels: Normal caliber OU  Periphery: flat OU    Labs/Imaging:  *** 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  Patient with reported history of left-sided headache and temporal tenderness over the 2 days.  He was seen by ophthalmology who performed full exam and referred patient to ED for further work up. Pt notes some blurry vision at appointment. He denies current HA, dizziness, chest pain, sob, abd pain, n/v/d, numbness or weakness    Interval History: Vision and color plates improved, no GCA ROS at this time    PMH: CLL, BPH, prior herpeskeratitis   POcHx: Prior Herpes keratitis   FH: denies glc/amd  Social History: denies etoh/tobacco  Ophthalmic Medications: none  Allergies: NKDA    Review of Systems:  Constitutional: No fever, chills  Eyes: No blurry vision, flashes, floaters, FBS, erythema, discharge, double vision, OU  Neuro: No tremors  Cardiovascular: No chest pain, palpitations  Respiratory: No SOB, no cough  GI: No nausea, vomiting, abdominal pain  : No dysuria  Skin: no rash  Psych: no depression  Endocrine: no polyuria, polydipsia  Heme/lymph: no swelling    VITALS: T(C): 36.3 (06-27-25 @ 12:49)  T(F): 97.3 (06-27-25 @ 12:49), Max: 97.3 (06-27-25 @ 12:49)  HR: 82 (06-27-25 @ 12:49) (82 - 82)  BP: 119/80 (06-27-25 @ 12:49) (119/80 - 119/80)  RR:  (18 - 18)  SpO2:  (97% - 97%)  Wt(kg): --  General: AAO x 3, appropriate mood and affect    Ophthalmology Exam:  Visual acuity (sc): 20/50 ph 20/40 OD, 20/60 ph 20/40 OS  Pupils: + Trace APD OS  Ttono: 17 OD 18 OS  Extraocular movements (EOMs): Full OU, no pain, no diplopia  Confrontational Visual Field (CVF): Full OD, Full OS  Color Plates: 12/12 OD, 12/12 OS    Pen Light Exam (PLE)  External: Flat OU  Lids/Lashes/Lacrimal Ducts: Flat OU    Sclera/Conjunctiva: W+Q OU  Cornea: Cl OU  Anterior Chamber: D+F OU    Iris: Flat OU  Lens: Cl OU    Fundus Exam: dilated with 1% tropicamide and 2.5% phenylephrine  Approval obtained from primary team for dilation  Patient aware that pupils can remained dilated for at least 4-6 hours  Exam performed with 20D lens    Vitreous: wnl OU  Disc, cup/disc: sharp and pink, 0.35 OU  Macula: Flat OU  Vessels: Normal caliber OU  Periphery: flat OU    Labs/Imaging:  *** [facility] DEPARTMENT OF OPHTHALMOLOGY - INITIAL ADULT CONSULT  -----------------------------------------------------------------------------  Isidro Joe MD, PGY-3  Contact: TEAMS  -----------------------------------------------------------------------------    HPI: 74-year-old male with history of CLL, BPH, 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  Patient with reported history of left-sided headache and temporal tenderness over the 2 days.  He was seen by ophthalmology who performed full exam and referred patient to ED for further work up. Pt notes some blurry vision at appointment. He denies current HA, dizziness, chest pain, sob, abd pain, n/v/d, numbness or weakness    Interval History: Vision and color plates improved, no GCA ROS at this time    PMH: CLL, BPH, prior herpeskeratitis   POcHx: Prior Herpes keratitis   FH: denies glc/amd  Social History: denies etoh/tobacco  Ophthalmic Medications: none  Allergies: NKDA    Review of Systems:  Constitutional: No fever, chills  Eyes: No blurry vision, flashes, floaters, FBS, erythema, discharge, double vision, OU  Neuro: No tremors  Cardiovascular: No chest pain, palpitations  Respiratory: No SOB, no cough  GI: No nausea, vomiting, abdominal pain  : No dysuria  Skin: no rash  Psych: no depression  Endocrine: no polyuria, polydipsia  Heme/lymph: no swelling    VITALS: T(C): 36.3 (06-27-25 @ 12:49)  T(F): 97.3 (06-27-25 @ 12:49), Max: 97.3 (06-27-25 @ 12:49)  HR: 82 (06-27-25 @ 12:49) (82 - 82)  BP: 119/80 (06-27-25 @ 12:49) (119/80 - 119/80)  RR:  (18 - 18)  SpO2:  (97% - 97%)  Wt(kg): --  General: AAO x 3, appropriate mood and affect    Ophthalmology Exam:  Visual acuity (sc): 20/50 ph 20/40 OD, 20/60 ph 20/40 OS  Pupils: + Trace APD OS  Ttono: 17 OD 18 OS  Extraocular movements (EOMs): Full OU, no pain, no diplopia  Confrontational Visual Field (CVF): Full OD, Full OS  Color Plates: 12/12 OD, 12/12 OS    Pen Light Exam (PLE)  External: Flat OU  Lids/Lashes/Lacrimal Ducts: Flat OU    Sclera/Conjunctiva: W+Q OU  Cornea: Cl OU  Anterior Chamber: D+F OU    Iris: Flat OU  Lens: Cl OU    Fundus Exam: dilated with 1% tropicamide and 2.5% phenylephrine  Approval obtained from primary team for dilation  Patient aware that pupils can remained dilated for at least 4-6 hours  Exam performed with 20D lens    Vitreous: wnl OU  Disc, cup/disc: sharp and pink, 0.35 OU  Macula: Flat OU  Vessels: Normal caliber OU  Periphery: flat OU [facility] DEPARTMENT OF OPHTHALMOLOGY - INITIAL ADULT CONSULT  -----------------------------------------------------------------------------  Isidro Joe MD, PGY-3  Contact: TEAMS  -----------------------------------------------------------------------------    HPI: 74-year-old male with history of CLL, BPH, 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  Patient with reported history of left-sided headache and temporal tenderness over the 2 days.  He was seen by ophthalmology who performed full exam and referred patient to ED for further work up. Pt notes some blurry vision at appointment. He denies current HA, dizziness, chest pain, sob, abd pain, n/v/d, numbness or weakness    Interval History: Vision and color plates improved, no GCA ROS at this time    PMH: CLL, BPH, prior herpeskeratitis   POcHx: Prior Herpes keratitis   FH: denies glc/amd  Social History: denies etoh/tobacco  Ophthalmic Medications: none  Allergies: NKDA    Review of Systems:  Constitutional: No fever, chills  Eyes: No blurry vision, flashes, floaters, FBS, erythema, discharge, double vision, OU  Neuro: No tremors  Cardiovascular: No chest pain, palpitations  Respiratory: No SOB, no cough  GI: No nausea, vomiting, abdominal pain  : No dysuria  Skin: no rash  Psych: no depression  Endocrine: no polyuria, polydipsia  Heme/lymph: no swelling    VITALS: T(C): 36.3 (06-27-25 @ 12:49)  T(F): 97.3 (06-27-25 @ 12:49), Max: 97.3 (06-27-25 @ 12:49)  HR: 82 (06-27-25 @ 12:49) (82 - 82)  BP: 119/80 (06-27-25 @ 12:49) (119/80 - 119/80)  RR:  (18 - 18)  SpO2:  (97% - 97%)  Wt(kg): --  General: AAO x 3, appropriate mood and affect    Ophthalmology Exam:  Visual acuity (sc): 20/50 ph 20/40 OD, 20/60 ph 20/40 OS  Pupils: + Trace APD OS  Ttono: 17 OD 18 OS  Extraocular movements (EOMs): Full OU, no pain, no diplopia  Confrontational Visual Field (CVF): Full OD, Full OS  Color Plates: 12/12 OD, 12/12 OS    Pen Light Exam (PLE)  External: Flat OU  Lids/Lashes/Lacrimal Ducts: Flat OU    Sclera/Conjunctiva: W+Q OU  Cornea: Cl OU  Anterior Chamber: D+F OU    Iris: Flat OU  Lens: Cl OU    Fundus Exam: dilated with 1% tropicamide and 2.5% phenylephrine  Approval obtained from primary team for dilation  Patient aware that pupils can remained dilated for at least 4-6 hours  Exam performed with 20D lens    Vitreous: wnl OU  Disc, cup/disc: 0.35 OU. Mild temporal pallor OS. No disc edema OU.  Macula: Flat OU  Vessels: Normal caliber OU  Periphery: flat OU

## 2025-06-27 NOTE — CONSULT NOTE ADULT - ASSESSMENT
Assessment and Recommendations:  74y male with a past medical history/ocular history of *** consulted for ***, found to have ***    Seen and discussed with ***.    Outpatient Follow-up: Patient should follow-up with his/her ophthalmologist or with [facility] Department of Ophthalmology within 1 week of after discharge at:    [address]  [place] [address]  [phone]    Isidro Joe MD, PGY-2  Contact: [employer] Teams     Assessment and Recommendations:  74y male with a past medical history/ocular history of active CLL and prior herpeskeratitis consulted for r/o GCA     #R/o Giant Cell Arthritis   - Pt with BCVA 20/40 OD, 20/40 OS, + Trace APD OS, and IOP 17,18  - PT presented to outside ophthalmologist with + APD OS, 3/11 color plates, and decreased vision and headache, now with full color plates, no headache and improved vision. (Still +trace APD OS)  - GCA ROS negative, no scalp tenderness, no pain with chewing, temporal pulses full bilaterally  - Color Plates Full, CVF full, Optic nerve without edema, no hemorrhages   - ESR pending, CRP 35  - Recommend Rheumatology consult considering "resolved" GCA symptoms but elevated labs, appropriate age, and prior exam more concerning for GCA    Seen and discussed with Dr. Aceves, Consult Attending    Outpatient Follow-up: Patient should follow-up with his/her ophthalmologist or with [facility] Department of Ophthalmology within 1 week of after discharge at:    [address]  [place] [address]  [phone]    Isidro Joe MD, PGY-2  Contact: [employer] Teams     Assessment and Recommendations:  74y male with a past medical history/ocular history of active CLL and prior herpeskeratitis consulted for r/o GCA     #R/o Giant Cell Arthritis   - Pt with BCVA 20/40 OD, 20/40 OS, + Trace APD OS, and IOP 17,18  - PT presented to outside ophthalmologist with +APD OS, 3/11 color plates, and decreased vision and headache, now with full color plates, no headache and improved vision. (Still +trace APD OS)  - GCA ROS negative, no scalp tenderness, no pain with chewing, temporal pulses full bilaterally  - Color Plates Full, CVF full, Optic nerve without edema, no hemorrhages   - ESR pending, CRP 35  - Recommend Rheumatology consult considering "resolved" GCA symptoms but elevated labs, appropriate age, and prior exam more concerning for GCA    Seen and discussed with Dr. Aecves, Consult Attending    Outpatient Follow-up: Patient should follow-up with his/her ophthalmologist or with [facility] Department of Ophthalmology within 1 week of after discharge at:    [address]  [place] [address]  [phone]    Isidro Joe MD, PGY-2  Contact: [employer] Teams     Assessment and Recommendations:  74y male with a past medical history/ocular history of active CLL and prior herpeskeratitis consulted for r/o GCA     #R/o Giant Cell Arthritis   - Pt with BCVA 20/40 OD, 20/40 OS, + Trace APD OS, and IOP 17,18  - PT presented to outside ophthalmologist with +APD OS, 3/11 color plates, and decreased vision and headache, now with full color plates, no headache and improved vision. (Still +trace APD OS)  - GCA ROS negative, no scalp tenderness, no pain with chewing, temporal pulses full bilaterally  - Color Plates Full, CVF full, Optic nerve without edema, no hemorrhages   - ESR pending, CRP 35  - Recommend Rheumatology consult    Seen and discussed with Dr. Aceves, Consult Attending    Outpatient Follow-up: Patient should follow-up with his/her ophthalmologist or with [facility] Department of Ophthalmology within 1 week of after discharge at:    [address]  [place] [address]  [phone]    Isidro Joe MD, PGY-2  Contact: [employer] Teams

## 2025-06-27 NOTE — H&P ADULT - PROBLEM SELECTOR PLAN 1
Admit to Dr. Montanez   Seen by Rheum and Ophtho in ED   recs:  1000mg iv methylprednisolone x 3doses first dose today    MRI brain and orbits; as well, please obtain MRA head/neck  Vascular consult in AM for consideration of left TAB  restart home meds

## 2025-06-27 NOTE — H&P ADULT - ASSESSMENT
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  Patient with reported history of left-sided headache and temporal tenderness over the 2 days.  He was seen by ophthalmology who performed full exam and referred patient to ED for further work up. Pt notes some blurry vision at appointment. He denies current HA, dizziness, chest pain, sob, abd pain, n/v/d, numbness or weakness seen in the ED by opthalmology and rheumatology. elevated CRP, needs IV steroids and MRI per Rheum so will be admitted for further management

## 2025-06-27 NOTE — ED PROVIDER NOTE - CHILD ABUSE FACILITY
----- Message from Zaida Marie MD sent at 2/11/2021  8:53 AM CST -----  Please contact parent/guardian with results.    
Pt's parent informed of normal results. Verbalized understanding, no further questions at this time.    
[facility]

## 2025-06-27 NOTE — H&P ADULT - NSHPPHYSICALEXAM_GEN_ALL_CORE
PHYSICAL EXAM  General: NAD   HEENT:  NC/AT  Neurology: A&Ox3, NAD  CV : [id number]  Lungs: Respirations non-labored, B/L BS clear  Abdomen: Soft, NT/ND, +BSx4Q  Extremities: negative B/L LE edema, negative calf tenderness, +PP B/L   Musculoskeletal: B/L UE and LE 5/5 strength   Psychiatric: Normal mood, normal affect observed  Skin: Normal exam to inspection and palpation. no bleeding, no hematoma.

## 2025-06-27 NOTE — ED PROVIDER NOTE - OBJECTIVE STATEMENT
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  Patient with reported history of left-sided headache and temporal tenderness over the 2 days.  He was seen by ophthalmology who performed full exam and referred patient to ED for further work up. Pt notes some blurry vision at appointment. He denies current HA, dizziness, chest pain, sob, abd pain, n/v/d, numbness or weakness

## 2025-06-27 NOTE — ED PROVIDER NOTE - PROGRESS NOTE DETAILS
Review ophthalmology exam for documentation patient visual acuity 20/40 OD and 20/50 OS, bilateral IOP's WNL, noted positive APD on left on outpatient exam prior to dilation I have assumed care of this patient. I have fully participated in the care of this patient. I have made amendments to the documentation where appropriate and have supervised the ongoing plan of care enacted by the Fellow/Resident/ACP/Student.  RUDY WOLFEP Patient seen by both ophthalmology and rheumatology services.  Ophthalmology deferred to rheumatology for starting steroids.  Patient is known by our CT surgery team who evaluated patient in the emergency department and advised if patient needs to be admitted they will accept him to their service.  Rheumatology recommending admission for steroids, further imaging and vascular consult.  Spoke to CT surgery PA who advised to admit patient to Dr. Lisseth Love PA-C

## 2025-06-27 NOTE — ED ADULT NURSE NOTE - OBJECTIVE STATEMENT
Patient  is alert  and  oriented  4.  Color  is  good  and skin warm  to touch.  He  is  c/o  pain to  his  left  eye.  He is  sent  here  for  r/o  arteritis.  He  denies  dizziness or  visual changes.

## 2025-06-27 NOTE — CHART NOTE - NSCHARTNOTEFT_GEN_A_CORE
Rheumatology service consulted for evaluation of GCA    74M PMH of CLL -- on Venetoclax, BPH, R carotid stenosis 2/2 atherosclerosis, HLD who presents at behest of Ophthalmologist due to possible concern for GCA  Patient seen and examined  He reports starting 6/25, he had episode where he had blurring of vision to the left half of visual field on his left eye. This was also associated with some left-sided headache with some TTP to the scalp near the distribution of temporal artery. He went to his ophthalmologist who noted afferent pupillary defect as well as some pallor of optic disc. Inflammatory markers were also noted to be elevated and he was advised to present to the hospital for further evaluation    He reports here, vision is back to baseline thought still with some TTP over scalp. He notes fatigue which he possibly attributes to taking venetoclax. He has also had persistent cough and rhinorrhea over the past month though this is also improving. He otherwise denies any jaw claudication symptoms, PMR symptoms, fever/chills, weight loss, night sweats, rashes, arthralgia, CP, SOB, n/v/d, or urinary symptoms. No hx Raynaud's    Exam:  Some scalp TTP over temporal region  No carotid or subclavian a. bruit  Temporal arteries full b/l  No TTP over shoulders or thighs      Impression:  Per GCAPS risk calculator, intermediate risk of GCA given presence of HA, age, vision changes (though now with resolution) even with presence of underlying CLL  Also with elevated CRP though he does have hx CLL currently being treated which may also raise this  Optho evaluation appreciated: improved color plates, still with APD. Otherwise no other overt occular signs of GCA; however, given prior optho examination with possible concern for GCA as well as the vision changes he had prior, would opt to treat at this time given risk of potential irreversible vision loss associated with GCA. Will pursue full diagnostic workup of GCA including TAB    Recommendations:  - Obtain hepatitis panel, QuantiFeron TB  - Please start pulse steroids 1000mg IVP daily x 3 days, first dose today (after above hepatitis and QTB is obtained)  - Please obtain MRI brain and orbits; as well, please obtain MRA head/neck  - Vascular consult for consideration of left TAB  - Will follow ESR  - Rheumatology service to formally see with Attending physician tomorrow    Danish Perla MD, PGY-4  Rheumatology Fellow  Reachable on TEAMS Rheumatology service consulted for evaluation of GCA    74M PMH of CLL -- on Venetoclax, BPH, R carotid stenosis 2/2 atherosclerosis, HLD who presents at behest of Ophthalmologist due to possible concern for GCA  Patient seen and examined  He reports starting 6/25, he had episode where he had blurring of vision to the left half of visual field on his left eye. This was also associated with some left-sided headache with some TTP to the scalp near the distribution of temporal artery. He went to his ophthalmologist who noted afferent pupillary defect as well as some pallor of optic disc. Inflammatory markers were also noted to be elevated and he was advised to present to the hospital for further evaluation    He reports here, vision is back to baseline thought still with some TTP over scalp. He notes fatigue which he possibly attributes to taking venetoclax. He has also had persistent cough and rhinorrhea over the past month though this is also improving. He otherwise denies any jaw claudication symptoms, PMR symptoms, fever/chills, weight loss, night sweats, rashes, arthralgia, CP, SOB, n/v/d, or urinary symptoms. No hx Raynaud's    Exam:  Some scalp TTP over temporal region  No carotid or subclavian a. bruit  Temporal arteries full b/l  No TTP over shoulders or thighs      Impression:  Per GCAPS risk calculator, intermediate risk of GCA given presence of HA, age, vision changes (though now with resolution) even with presence of underlying CLL  Also with elevated CRP though he does have hx CLL currently being treated which may also raise this  Optho evaluation appreciated: improved color plates, still with APD. Otherwise no other overt occular signs of GCA; however, given prior optho examination with possible concern for GCA as well as the vision changes he had prior, would opt to treat at this time given risk of potential irreversible vision loss associated with GCA. Will pursue full diagnostic workup of GCA including TAB    Recommendations:  - Obtain hepatitis panel, QuantiFeron TB  - Please start pulse steroids 1000mg IVP daily x 3 days, first dose today (after above hepatitis and QTB is obtained)  - Please obtain MRI brain and orbits; as well, please obtain MRA head/neck  - Vascular consult for consideration of left TAB  - Will follow ESR  - Rheumatology service to formally see with Attending physician tomorrow    d/w Dr. Marcelina Perla MD, PGY-4  Rheumatology Fellow  Reachable on TEAMS Rheumatology service consulted for evaluation of GCA    74M PMH of CLL -- on Venetoclax, BPH, R carotid stenosis 2/2 atherosclerosis, HLD who presents at behest of Ophthalmologist due to possible concern for GCA  Patient seen and examined  He reports starting 6/25, he had episode where he had blurring of vision to the left half of visual field on his left eye. This was also associated with some left-sided headache with some TTP to the scalp near the distribution of temporal artery. He went to his ophthalmologist who noted afferent pupillary defect as well as some pallor of optic disc. Inflammatory markers were also noted to be elevated and he was advised to present to the hospital for further evaluation    He reports here, vision is back to baseline thought still with some TTP over scalp. He notes fatigue which he possibly attributes to taking venetoclax. He has also had persistent cough and rhinorrhea over the past month though this is also improving. He otherwise denies any jaw claudication symptoms, PMR symptoms, fever/chills, weight loss, night sweats, rashes, arthralgia, CP, SOB, n/v/d, or urinary symptoms. No hx Raynaud's    Exam:  Some scalp TTP over temporal region  No carotid or subclavian a. bruit  Temporal arteries full b/l  No TTP over shoulders or thighs      Impression:  Per GCAPS risk calculator, intermediate risk of GCA given presence of HA, age, vision changes (though now with resolution) even with presence of underlying CLL  Also with elevated CRP 36 though he does have hx CLL currently being treated which may also raise this  Optho evaluation appreciated: improved color plates, still with APD. Otherwise no other overt occular signs of GCA; however, given prior optho examination with possible concern for GCA as well as the vision changes he had prior, would opt to treat at this time given risk of potential irreversible vision loss associated with GCA. Will pursue full diagnostic workup of GCA including TAB    Recommendations:  - Obtain hepatitis panel, QuantiFeron TB  - Please start pulse steroids 1000mg IVP daily x 3 days, first dose today (after above hepatitis and QTB is obtained)  - Please obtain MRI brain and orbits; as well, please obtain MRA head/neck  - Vascular consult for consideration of left TAB  - Will follow ESR  - Rheumatology service to formally see with Attending physician tomorrow    d/w Dr. Marcelina Perla MD, PGY-4  Rheumatology Fellow  Reachable on TEAMS

## 2025-06-27 NOTE — ED PROVIDER NOTE - PHYSICAL EXAMINATION
Imaging Studies/Medications
CONSTITUTIONAL: Patient is awake, alert and oriented x 3. Patient is well appearing and in no acute distress  HEAD: NCAT  EYES: Pupils reactive, + APD OS, EOMI  NECK: supple, FROM  LUNGS: CTA b/l, no wheezing or rales   HEART: RRR.+S1S2 no murmurs  ABDOMEN: Soft, non-distended, nttp,  no rebound or guarding  EXTREMITY: No edema or calf tenderness b/l, FROM upper and lower ext b/l  SKIN: No rash or lesions  NEURO: No focal deficits

## 2025-06-28 LAB
ALBUMIN SERPL ELPH-MCNC: 4.2 G/DL — SIGNIFICANT CHANGE UP (ref 3.3–5)
ALP SERPL-CCNC: 76 U/L — SIGNIFICANT CHANGE UP (ref 40–120)
ALT FLD-CCNC: 22 U/L — SIGNIFICANT CHANGE UP (ref 10–45)
ANION GAP SERPL CALC-SCNC: 15 MMOL/L — SIGNIFICANT CHANGE UP (ref 5–17)
APPEARANCE UR: CLEAR — SIGNIFICANT CHANGE UP
AST SERPL-CCNC: 14 U/L — SIGNIFICANT CHANGE UP (ref 10–40)
BASOPHILS # BLD AUTO: 0 K/UL — SIGNIFICANT CHANGE UP (ref 0–0.2)
BASOPHILS NFR BLD AUTO: 0 % — SIGNIFICANT CHANGE UP (ref 0–2)
BILIRUB SERPL-MCNC: 0.4 MG/DL — SIGNIFICANT CHANGE UP (ref 0.2–1.2)
BILIRUB UR-MCNC: NEGATIVE — SIGNIFICANT CHANGE UP
BUN SERPL-MCNC: 22 MG/DL — SIGNIFICANT CHANGE UP (ref 7–23)
CALCIUM SERPL-MCNC: 9.8 MG/DL — SIGNIFICANT CHANGE UP (ref 8.4–10.5)
CHLORIDE SERPL-SCNC: 102 MMOL/L — SIGNIFICANT CHANGE UP (ref 96–108)
CO2 SERPL-SCNC: 22 MMOL/L — SIGNIFICANT CHANGE UP (ref 22–31)
COLOR SPEC: YELLOW — SIGNIFICANT CHANGE UP
CREAT SERPL-MCNC: 0.89 MG/DL — SIGNIFICANT CHANGE UP (ref 0.5–1.3)
CRP SERPL-MCNC: 29 MG/L — HIGH (ref 0–4)
DIFF PNL FLD: NEGATIVE — SIGNIFICANT CHANGE UP
EGFR: 89 ML/MIN/1.73M2 — SIGNIFICANT CHANGE UP
EGFR: 89 ML/MIN/1.73M2 — SIGNIFICANT CHANGE UP
EOSINOPHIL # BLD AUTO: 0 K/UL — SIGNIFICANT CHANGE UP (ref 0–0.5)
EOSINOPHIL NFR BLD AUTO: 0 % — SIGNIFICANT CHANGE UP (ref 0–6)
ERYTHROCYTE [SEDIMENTATION RATE] IN BLOOD: 27 MM/HR — HIGH (ref 0–15)
ERYTHROCYTE [SEDIMENTATION RATE] IN BLOOD: 32 MM/HR — HIGH (ref 0–15)
FLUAV AG NPH QL: SIGNIFICANT CHANGE UP
FLUBV AG NPH QL: SIGNIFICANT CHANGE UP
GLUCOSE SERPL-MCNC: 173 MG/DL — HIGH (ref 70–99)
GLUCOSE UR QL: NEGATIVE MG/DL — SIGNIFICANT CHANGE UP
HAV IGM SER-ACNC: SIGNIFICANT CHANGE UP
HBV CORE AB SER-ACNC: SIGNIFICANT CHANGE UP
HBV CORE IGM SER-ACNC: SIGNIFICANT CHANGE UP
HBV SURFACE AG SER-ACNC: SIGNIFICANT CHANGE UP
HCT VFR BLD CALC: 40.7 % — SIGNIFICANT CHANGE UP (ref 39–50)
HCV AB S/CO SERPL IA: 0.05 S/CO — SIGNIFICANT CHANGE UP (ref 0–0.79)
HCV AB SERPL-IMP: SIGNIFICANT CHANGE UP
HGB BLD-MCNC: 13.7 G/DL — SIGNIFICANT CHANGE UP (ref 13–17)
IMM GRANULOCYTES # BLD AUTO: 0.02 K/UL — SIGNIFICANT CHANGE UP (ref 0–0.07)
IMM GRANULOCYTES NFR BLD AUTO: 0.7 % — SIGNIFICANT CHANGE UP (ref 0–0.9)
KETONES UR QL: NEGATIVE MG/DL — SIGNIFICANT CHANGE UP
LEUKOCYTE ESTERASE UR-ACNC: NEGATIVE — SIGNIFICANT CHANGE UP
LYMPHOCYTES # BLD AUTO: 0.36 K/UL — LOW (ref 1–3.3)
LYMPHOCYTES NFR BLD AUTO: 12.3 % — LOW (ref 13–44)
MCHC RBC-ENTMCNC: 30.4 PG — SIGNIFICANT CHANGE UP (ref 27–34)
MCHC RBC-ENTMCNC: 33.7 G/DL — SIGNIFICANT CHANGE UP (ref 32–36)
MCV RBC AUTO: 90.2 FL — SIGNIFICANT CHANGE UP (ref 80–100)
MONOCYTES # BLD AUTO: 0.06 K/UL — SIGNIFICANT CHANGE UP (ref 0–0.9)
MONOCYTES NFR BLD AUTO: 2.1 % — SIGNIFICANT CHANGE UP (ref 2–14)
NEUTROPHILS # BLD AUTO: 2.48 K/UL — SIGNIFICANT CHANGE UP (ref 1.8–7.4)
NEUTROPHILS NFR BLD AUTO: 84.9 % — HIGH (ref 43–77)
NITRITE UR-MCNC: NEGATIVE — SIGNIFICANT CHANGE UP
NRBC # BLD AUTO: 0 K/UL — SIGNIFICANT CHANGE UP (ref 0–0)
NRBC # FLD: 0 K/UL — SIGNIFICANT CHANGE UP (ref 0–0)
NRBC BLD AUTO-RTO: 0 /100 WBCS — SIGNIFICANT CHANGE UP (ref 0–0)
PH UR: 6.5 — SIGNIFICANT CHANGE UP (ref 5–8)
PLATELET # BLD AUTO: 128 K/UL — LOW (ref 150–400)
PMV BLD: 8.9 FL — SIGNIFICANT CHANGE UP (ref 7–13)
POTASSIUM SERPL-MCNC: 4.3 MMOL/L — SIGNIFICANT CHANGE UP (ref 3.5–5.3)
POTASSIUM SERPL-SCNC: 4.3 MMOL/L — SIGNIFICANT CHANGE UP (ref 3.5–5.3)
PROT SERPL-MCNC: 6.6 G/DL — SIGNIFICANT CHANGE UP (ref 6–8.3)
PROT UR-MCNC: NEGATIVE MG/DL — SIGNIFICANT CHANGE UP
RBC # BLD: 4.51 M/UL — SIGNIFICANT CHANGE UP (ref 4.2–5.8)
RBC # FLD: 12.9 % — SIGNIFICANT CHANGE UP (ref 10.3–14.5)
RSV RNA NPH QL NAA+NON-PROBE: SIGNIFICANT CHANGE UP
SARS-COV-2 RNA SPEC QL NAA+PROBE: SIGNIFICANT CHANGE UP
SODIUM SERPL-SCNC: 139 MMOL/L — SIGNIFICANT CHANGE UP (ref 135–145)
SOURCE RESPIRATORY: SIGNIFICANT CHANGE UP
SP GR SPEC: 1.02 — SIGNIFICANT CHANGE UP (ref 1–1.03)
UROBILINOGEN FLD QL: 0.2 MG/DL — SIGNIFICANT CHANGE UP (ref 0.2–1)
WBC # BLD: 2.92 K/UL — LOW (ref 3.8–10.5)
WBC # FLD AUTO: 2.92 K/UL — LOW (ref 3.8–10.5)

## 2025-06-28 PROCEDURE — 70547 MR ANGIOGRAPHY NECK W/O DYE: CPT | Mod: 26,59

## 2025-06-28 PROCEDURE — 99222 1ST HOSP IP/OBS MODERATE 55: CPT | Mod: FS

## 2025-06-28 PROCEDURE — 70551 MRI BRAIN STEM W/O DYE: CPT | Mod: 26

## 2025-06-28 PROCEDURE — 99223 1ST HOSP IP/OBS HIGH 75: CPT

## 2025-06-28 PROCEDURE — 70544 MR ANGIOGRAPHY HEAD W/O DYE: CPT | Mod: 26,59

## 2025-06-28 PROCEDURE — 70540 MRI ORBIT/FACE/NECK W/O DYE: CPT | Mod: 26

## 2025-06-28 PROCEDURE — 99232 SBSQ HOSP IP/OBS MODERATE 35: CPT | Mod: FS

## 2025-06-28 RX ORDER — ENOXAPARIN SODIUM 100 MG/ML
40 INJECTION SUBCUTANEOUS EVERY 24 HOURS
Refills: 0 | Status: DISCONTINUED | OUTPATIENT
Start: 2025-06-28 | End: 2025-07-02

## 2025-06-28 RX ORDER — B1/B2/B3/B5/B6/B12/VIT C/FOLIC 500-0.5 MG
1 TABLET ORAL DAILY
Refills: 0 | Status: DISCONTINUED | OUTPATIENT
Start: 2025-06-28 | End: 2025-07-02

## 2025-06-28 RX ORDER — METOPROLOL SUCCINATE 50 MG/1
25 TABLET, EXTENDED RELEASE ORAL DAILY
Refills: 0 | Status: DISCONTINUED | OUTPATIENT
Start: 2025-06-28 | End: 2025-07-02

## 2025-06-28 RX ORDER — VENETOCLAX 10 MG/1
400 TABLET, FILM COATED ORAL
Refills: 0 | Status: DISCONTINUED | OUTPATIENT
Start: 2025-06-28 | End: 2025-06-29

## 2025-06-28 RX ADMIN — ENOXAPARIN SODIUM 40 MILLIGRAM(S): 100 INJECTION SUBCUTANEOUS at 11:06

## 2025-06-28 RX ADMIN — PAROXETINE HYDROCHLORIDE 10 MILLIGRAM(S): 20 TABLET, FILM COATED ORAL at 21:39

## 2025-06-28 RX ADMIN — METHYLPREDNISOLONE ACETATE 50 MILLIGRAM(S): 80 INJECTION, SUSPENSION INTRA-ARTICULAR; INTRALESIONAL; INTRAMUSCULAR; SOFT TISSUE at 11:03

## 2025-06-28 RX ADMIN — TAMSULOSIN HYDROCHLORIDE 0.4 MILLIGRAM(S): 0.4 CAPSULE ORAL at 21:39

## 2025-06-28 RX ADMIN — Medication 3 MILLILITER(S): at 21:37

## 2025-06-28 RX ADMIN — Medication 1 TABLET(S): at 11:06

## 2025-06-28 RX ADMIN — Medication 3 MILLILITER(S): at 12:10

## 2025-06-28 RX ADMIN — METOPROLOL SUCCINATE 25 MILLIGRAM(S): 50 TABLET, EXTENDED RELEASE ORAL at 11:06

## 2025-06-28 RX ADMIN — FINASTERIDE 5 MILLIGRAM(S): 1 TABLET, FILM COATED ORAL at 21:39

## 2025-06-28 RX ADMIN — FUROSEMIDE 20 MILLIGRAM(S): 10 INJECTION INTRAMUSCULAR; INTRAVENOUS at 11:06

## 2025-06-28 RX ADMIN — Medication 40 MILLIGRAM(S): at 18:02

## 2025-06-28 RX ADMIN — Medication 3 MILLILITER(S): at 06:04

## 2025-06-28 RX ADMIN — VENETOCLAX 400 MILLIGRAM(S): 10 TABLET, FILM COATED ORAL at 20:00

## 2025-06-28 RX ADMIN — Medication 500 MILLIGRAM(S): at 11:06

## 2025-06-28 NOTE — CONSULT NOTE ADULT - SUBJECTIVE AND OBJECTIVE BOX
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  Patient with reported history of left-sided headache and temporal tenderness over the 2 days.  He was seen by ophthalmology who performed full exam and referred patient to ED for further work up. Pt notes some blurry vision at appointment. He denies current HA, dizziness, chest pain, sob, abd pain, n/v/d, numbness or weakness seen in the ED by opthalmology and rheumatology. elevated CRP, needs IV steroids and MRI per Rheum so will be admitted for further management  (27 Jun 2025 20:44)      ROS: 10-system review is otherwise negative except HPI above.      PAST MEDICAL & SURGICAL HISTORY:  CLL (chronic lymphocytic leukemia)      History of BPH      H/O excision of mass  R hip        FAMILY HISTORY:      SOCIAL HISTORY:      ALLERGIES: Bactrim (Unknown)      HOME MEDICATIONS:     CURRENT MEDICATIONS  MEDICATIONS (STANDING): ascorbic acid 500 milliGRAM(s) Oral daily  enoxaparin Injectable 40 milliGRAM(s) SubCutaneous every 24 hours  finasteride 5 milliGRAM(s) Oral daily  furosemide    Tablet 20 milliGRAM(s) Oral daily  methylPREDNISolone sodium succinate IVPB 1000 milliGRAM(s) IV Intermittent every 24 hours  metoprolol succinate ER 25 milliGRAM(s) Oral daily  multivitamin 1 Tablet(s) Oral daily  PARoxetine 10 milliGRAM(s) Oral daily  sodium chloride 0.9% lock flush 3 milliLiter(s) IV Push every 8 hours  tamsulosin 0.4 milliGRAM(s) Oral at bedtime  venetoclax 400 milliGRAM(s) Oral <User Schedule>    MEDICATIONS (PRN):  --------------------------------------------------------------------------------------------    Vitals:   T(C): 36.5 (06-28-25 @ 05:21), Max: 36.7 (06-27-25 @ 20:20)  HR: 96 (06-28-25 @ 11:06) (66 - 96)  BP: 123/73 (06-28-25 @ 09:13) (100/62 - 123/73)  RR: 18 (06-28-25 @ 05:21) (18 - 18)  SpO2: 95% (06-28-25 @ 09:13) (93% - 98%)  CAPILLARY BLOOD GLUCOSE        CAPILLARY BLOOD GLUCOSE          06-27 @ 07:01  -  06-28 @ 07:00  --------------------------------------------------------  IN:  Total IN: 0 mL    OUT:    Voided (mL): 250 mL  Total OUT: 250 mL    Total NET: -250 mL      06-28 @ 07:01  -  06-28 @ 11:16  --------------------------------------------------------  IN:    Oral Fluid: 360 mL  Total IN: 360 mL    OUT:  Total OUT: 0 mL    Total NET: 360 mL        Height (cm): 160 (06-27 @ 12:49)  Weight (kg): 68 (06-27 @ 12:49)  BMI (kg/m2): 26.6 (06-27 @ 12:49)  BSA (m2): 1.71 (06-27 @ 12:49)    PHYSICAL EXAM:   General: NAD, Lying in bed comfortably  Neuro: A+Ox3  HEENT: NC/AT, EOMI  Neck: Soft, supple  Cardio: RRR, nml S1/S2  Resp: Good effort, CTA b/l  GI/Abd: Soft, NT/ND, no rebound/guarding, no masses palpated  Vascular:   Skin: Intact, no breakdown  Lymphatic/Nodes: No palpable lymphadenopathy  Musculoskeletal: All 4 extremities moving spontaneously, no limitations.  --------------------------------------------------------------------------------------------    LABS  CBC (06-28 @ 06:43)                              13.7                           2.92[L]  )----------------(  128[L]     84.9[H]% Neutrophils, 12.3[L]% Lymphocytes, ANC: 2.48                                40.7    CBC (06-27 @ 14:12)                              13.2                           3.92    )----------------(  142[L]     69.1  % Neutrophils, 13.0  % Lymphocytes, ANC: 2.71                                39.8      BMP (06-28 @ 06:42)             139     |  102     |  22    		Ca++ --      Ca 9.8                ---------------------------------( 173[H]		Mg --                 4.3     |  22      |  0.89  			Ph --      BMP (06-27 @ 14:12)             139     |  100     |  20    		Ca++ --      Ca 9.8                ---------------------------------( 99    		Mg --                 4.0     |  22      |  0.96  			Ph --        LFTs (06-28 @ 06:42)      TPro 6.6 / Alb 4.2 / TBili 0.4 / DBili -- / AST 14 / ALT 22 / AlkPhos 76  LFTs (06-27 @ 14:12)      TPro 6.9 / Alb 4.3 / TBili 0.3 / DBili -- / AST 19 / ALT 23 / AlkPhos 78    Coags (06-27 @ 14:12)  aPTT 32.8 / INR 1.09 / PT 12.5          --------------------------------------------------------------------------------------------    MICROBIOLOGY  Urinalysis (06-28 @ 06:42):     Color:  / Appearance:  / SG:  / pH:  / Gluc: 173[H] / Ketones:  / Bili:  / Urobili:  / Protein : / Nitrites:  / Leuk.Est:  / RBC:  / WBC:  / Sq Epi:  / Non Sq Epi:  / Bacteria          --------------------------------------------------------------------------------------------    IMAGING     VASCULAR SURGERY CONSULT NOTE  --------------------------------------------------------------------------------------------    Patient is a 74y old  Male who presents with a chief complaint of r/o GCA (27 Jun 2025 16:55)      HPI: HPI:  74-year-old male with history of CLL, BPH, 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  Patient with reported history of left-sided headache and temporal tenderness over the 2 days.  He was seen by ophthalmology who performed full exam and referred patient to ED for further work up. Pt notes some blurry vision at appointment. He denies current HA, dizziness, chest pain, sob, abd pain, n/v/d, numbness or weakness seen in the ED by opthalmology and rheumatology. elevated CRP, needs IV steroids and MRI per Rheum so will be admitted for further management  (27 Jun 2025 20:44)      ROS: 10-system review is otherwise negative except HPI above.      PAST MEDICAL & SURGICAL HISTORY:  CLL (chronic lymphocytic leukemia)      History of BPH      H/O excision of mass  R hip        FAMILY HISTORY:      SOCIAL HISTORY:      ALLERGIES: Bactrim (Unknown)      HOME MEDICATIONS:     CURRENT MEDICATIONS  MEDICATIONS (STANDING): ascorbic acid 500 milliGRAM(s) Oral daily  enoxaparin Injectable 40 milliGRAM(s) SubCutaneous every 24 hours  finasteride 5 milliGRAM(s) Oral daily  furosemide    Tablet 20 milliGRAM(s) Oral daily  methylPREDNISolone sodium succinate IVPB 1000 milliGRAM(s) IV Intermittent every 24 hours  metoprolol succinate ER 25 milliGRAM(s) Oral daily  multivitamin 1 Tablet(s) Oral daily  PARoxetine 10 milliGRAM(s) Oral daily  sodium chloride 0.9% lock flush 3 milliLiter(s) IV Push every 8 hours  tamsulosin 0.4 milliGRAM(s) Oral at bedtime  venetoclax 400 milliGRAM(s) Oral <User Schedule>    MEDICATIONS (PRN):  --------------------------------------------------------------------------------------------    Vitals:   T(C): 36.5 (06-28-25 @ 05:21), Max: 36.7 (06-27-25 @ 20:20)  HR: 96 (06-28-25 @ 11:06) (66 - 96)  BP: 123/73 (06-28-25 @ 09:13) (100/62 - 123/73)  RR: 18 (06-28-25 @ 05:21) (18 - 18)  SpO2: 95% (06-28-25 @ 09:13) (93% - 98%)  CAPILLARY BLOOD GLUCOSE        CAPILLARY BLOOD GLUCOSE          06-27 @ 07:01  -  06-28 @ 07:00  --------------------------------------------------------  IN:  Total IN: 0 mL    OUT:    Voided (mL): 250 mL  Total OUT: 250 mL    Total NET: -250 mL      06-28 @ 07:01  -  06-28 @ 11:16  --------------------------------------------------------  IN:    Oral Fluid: 360 mL  Total IN: 360 mL    OUT:  Total OUT: 0 mL    Total NET: 360 mL        Height (cm): 160 (06-27 @ 12:49)  Weight (kg): 68 (06-27 @ 12:49)  BMI (kg/m2): 26.6 (06-27 @ 12:49)  BSA (m2): 1.71 (06-27 @ 12:49)    PHYSICAL EXAM:   General: No apparent distress  HEENT: EOMI, MMM  Neuro: AAOx3. No focal deficits  CVS: +S1/S2, RRR  Resp: Normal breath sounds  GI: Soft, NT/ND  MSK: no swelling/warmth/erythema of the joints of the UE/LE  Skin: no visible rashes  --------------------------------------------------------------------------------------------    LABS  CBC (06-28 @ 06:43)                              13.7                           2.92[L]  )----------------(  128[L]     84.9[H]% Neutrophils, 12.3[L]% Lymphocytes, ANC: 2.48                                40.7    CBC (06-27 @ 14:12)                              13.2                           3.92    )----------------(  142[L]     69.1  % Neutrophils, 13.0  % Lymphocytes, ANC: 2.71                                39.8      BMP (06-28 @ 06:42)             139     |  102     |  22    		Ca++ --      Ca 9.8                ---------------------------------( 173[H]		Mg --                 4.3     |  22      |  0.89  			Ph --      BMP (06-27 @ 14:12)             139     |  100     |  20    		Ca++ --      Ca 9.8                ---------------------------------( 99    		Mg --                 4.0     |  22      |  0.96  			Ph --        LFTs (06-28 @ 06:42)      TPro 6.6 / Alb 4.2 / TBili 0.4 / DBili -- / AST 14 / ALT 22 / AlkPhos 76  LFTs (06-27 @ 14:12)      TPro 6.9 / Alb 4.3 / TBili 0.3 / DBili -- / AST 19 / ALT 23 / AlkPhos 78    Coags (06-27 @ 14:12)  aPTT 32.8 / INR 1.09 / PT 12.5          --------------------------------------------------------------------------------------------    MICROBIOLOGY  Urinalysis (06-28 @ 06:42):     Color:  / Appearance:  / SG:  / pH:  / Gluc: 173[H] / Ketones:  / Bili:  / Urobili:  / Protein : / Nitrites:  / Leuk.Est:  / RBC:  / WBC:  / Sq Epi:  / Non Sq Epi:  / Bacteria          --------------------------------------------------------------------------------------------    IMAGING

## 2025-06-28 NOTE — PROGRESS NOTE ADULT - SUBJECTIVE AND OBJECTIVE BOX
VITAL SIGNS    Telemetry:    Vital Signs Last 24 Hrs  T(C): 36.5 (06-28-25 @ 05:21), Max: 36.7 (06-27-25 @ 20:20)  T(F): 97.7 (06-28-25 @ 05:21), Max: 98.1 (06-27-25 @ 20:20)  HR: 76 (06-28-25 @ 05:21) (66 - 82)  BP: 100/62 (06-28-25 @ 05:21) (100/62 - 122/71)  RR: 18 (06-28-25 @ 05:21) (18 - 18)  SpO2: 93% (06-28-25 @ 05:21) (93% - 98%)            06-27 @ 07:01  -  06-28 @ 07:00  --------------------------------------------------------  IN: 0 mL / OUT: 250 mL / NET: -250 mL       Daily Height in cm: 160.02 (27 Jun 2025 12:49)    Daily   Admit Wt: Drug Dosing Weight  Height (cm): 160 (27 Jun 2025 12:49)  Weight (kg): 68 (27 Jun 2025 12:49)  BMI (kg/m2): 26.6 (27 Jun 2025 12:49)  BSA (m2): 1.71 (27 Jun 2025 12:49)    Bilirubin Total: 0.4 mg/dL (06-28 @ 06:42)  Bilirubin Total: 0.3 mg/dL (06-27 @ 14:12)    CAPILLARY BLOOD GLUCOSE              LABS:    06-27 @ 07:01  -  06-28 @ 07:00  --------------------------------------------------------  IN: 0 mL / OUT: 250 mL / NET: -250 mL    cret                        13.7   2.92  )-----------( 128      ( 28 Jun 2025 06:43 )             40.7     06-28    139  |  102  |  22  ----------------------------<  173[H]  4.3   |  22  |  0.89    Ca    9.8      28 Jun 2025 06:42    TPro  6.6  /  Alb  4.2  /  TBili  0.4  /  DBili  x   /  AST  14  /  ALT  22  /  AlkPhos  76  06-28    PT/INR - ( 27 Jun 2025 14:12 )   PT: 12.5 sec;   INR: 1.09 ratio         PTT - ( 27 Jun 2025 14:12 )  PTT:32.8 sec    enoxaparin Injectable 40 milliGRAM(s) SubCutaneous every 24 hours  finasteride 5 milliGRAM(s) Oral daily  furosemide    Tablet 20 milliGRAM(s) Oral daily  methylPREDNISolone sodium succinate IVPB 1000 milliGRAM(s) IV Intermittent every 24 hours  PARoxetine 10 milliGRAM(s) Oral daily  sodium chloride 0.9% lock flush 3 milliLiter(s) IV Push every 8 hours  tamsulosin 0.4 milliGRAM(s) Oral at bedtime      PHYSICAL EXAM    Subjective: "Hi.   Neurology: alert and oriented x 3, nonfocal, no gross deficits  CV : tele:  RSR  Sternal Wound :  CDI with dressing , Stable  Lungs: clear. RR easy, unlabored   Abdomen: soft, nontender, nondistended, positive bowel sounds, bowel movement   Neg N/V/D   :  pt voiding without difficulty   Extremities:   LANDON; edema, neg calf tenderness.   PPP bilaterally      PW:  Chest tubes:                 VITAL SIGNS    Telemetry:  RSR   Vital Signs Last 24 Hrs  T(C): 36.5 (06-28-25 @ 05:21), Max: 36.7 (06-27-25 @ 20:20)  T(F): 97.7 (06-28-25 @ 05:21), Max: 98.1 (06-27-25 @ 20:20)  HR: 76 (06-28-25 @ 05:21) (66 - 82)  BP: 100/62 (06-28-25 @ 05:21) (100/62 - 122/71)  RR: 18 (06-28-25 @ 05:21) (18 - 18)  SpO2: 93% (06-28-25 @ 05:21) (93% - 98%)            06-27 @ 07:01  -  06-28 @ 07:00  --------------------------------------------------------  IN: 0 mL / OUT: 250 mL / NET: -250 mL       Daily Height in cm: 160.02 (27 Jun 2025 12:49)    Daily   Admit Wt: Drug Dosing Weight  Height (cm): 160 (27 Jun 2025 12:49)  Weight (kg): 68 (27 Jun 2025 12:49)  BMI (kg/m2): 26.6 (27 Jun 2025 12:49)  BSA (m2): 1.71 (27 Jun 2025 12:49)    Bilirubin Total: 0.4 mg/dL (06-28 @ 06:42)  Bilirubin Total: 0.3 mg/dL (06-27 @ 14:12)    CAPILLARY BLOOD GLUCOSE              LABS:    06-27 @ 07:01  -  06-28 @ 07:00  --------------------------------------------------------  IN: 0 mL / OUT: 250 mL / NET: -250 mL    cret                        13.7   2.92  )-----------( 128      ( 28 Jun 2025 06:43 )             40.7     06-28    139  |  102  |  22  ----------------------------<  173[H]  4.3   |  22  |  0.89    Ca    9.8      28 Jun 2025 06:42    TPro  6.6  /  Alb  4.2  /  TBili  0.4  /  DBili  x   /  AST  14  /  ALT  22  /  AlkPhos  76  06-28    PT/INR - ( 27 Jun 2025 14:12 )   PT: 12.5 sec;   INR: 1.09 ratio         PTT - ( 27 Jun 2025 14:12 )  PTT:32.8 sec    enoxaparin Injectable 40 milliGRAM(s) SubCutaneous every 24 hours  finasteride 5 milliGRAM(s) Oral daily  furosemide    Tablet 20 milliGRAM(s) Oral daily  methylPREDNISolone sodium succinate IVPB 1000 milliGRAM(s) IV Intermittent every 24 hours  PARoxetine 10 milliGRAM(s) Oral daily  sodium chloride 0.9% lock flush 3 milliLiter(s) IV Push every 8 hours  tamsulosin 0.4 milliGRAM(s) Oral at bedtime      PHYSICAL EXAM    Subjective: "I feel ok."   Neurology: alert and oriented x 3, nonfocal, no gross deficits  CV : tele:  RSR   Lungs: clear. RR easy, unlabored   Abdomen: soft, nontender, nondistended, positive bowel sounds,  + bowel movement   Neg N/V/D   :  pt voiding without difficulty   Extremities:   LANDON; neg LE edema, neg calf tenderness.   PPP bilaterally

## 2025-06-28 NOTE — PROGRESS NOTE ADULT - ASSESSMENT
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  Patient with reported history of left-sided headache and temporal tenderness over the 2 days.  He was seen by ophthalmology who performed full exam and referred patient to ED for further work up. Pt notes some blurry vision at appointment. He denies current HA, dizziness, chest pain, sob, abd pain, n/v/d, numbness or weakness seen in the ED by opthalmology and rheumatology. 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  Patient with reported history of left-sided headache and temporal tenderness over the 2 days.  He was seen by ophthalmology who performed full exam and referred patient to ED for further work up. Pt notes some blurry vision at appointment. He denies current HA, dizziness, chest pain, sob, abd pain, n/v/d, numbness or weakness seen in the ED by opthalmology and rheumatology. elevated CRP, needs IV steroids and MRI per Rheum so will be admitted for further management   6/28 VSS; RSR ; IV steroids as per rheum; ESR 32/ now 27--> continue to trend; vasc consulted for possible TAB; MRI brain/orbits and MRA head and neck non con pending for today  discharge planning- home when stable

## 2025-06-28 NOTE — CONSULT NOTE ADULT - ASSESSMENT
74M PMH CLL, BPH, prior herpes keratitis L eye, R carotid stenosis from atherosclerosis presenting with L eye blurry vision. Rheumatology consulted to evaluate for GCA    #eval for GCA  -Hx of 1 week of intermittent L eye blurry vision, intermittent L temporal pain, fatigue. Denies jaw claudication, scalp tenderness, PMR symptoms  -Outpt optho exam reportedly with afferent pupillary defect and pallor of optic disc in L eye  -Inpt optho exam with improved color palettes and mild afferent pupillary defect  -ESR 32 (age adjusted normal), CRP elevated at 36  -Started on solumedrol 1g qd sine 6/27    Although pt reports inconsistent L eye blurry vision and L side temporal pain, given APD on optho exam and elevated CRP, pt scores as high risk on GCA probability score. Recommend to c/w high dose steroids    Plan:  -Solumedrol 1g qd (6/27-6/29). Starting 6/30, can titrate to solumedrol or prednisone 60mg qd  -Please start pantoprazole 40mg qd   -Please obtain US temporal artery  -Agree with MRI brain and orbits to eval for other causes  -Recommend temporal artery biopsy of L side  -will follow up quant gold, hepatitis panel neg. Pt will need PCP ppx eventually if work up is positive for GCA    Case discussed with Dr Marcelina Blake MD  Rheumatology Fellow

## 2025-06-28 NOTE — PHYSICAL THERAPY INITIAL EVALUATION ADULT - PERTINENT HX OF CURRENT PROBLEM, REHAB EVAL
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  Pt presents to ophthalmology office for concerns of possible temporal arteritis.  Patient with reported history of left-sided headache and temporal tenderness over the 2 days.  He was seen by ophthalmology who performed full exam and referred patient to ED for further work up. Pt notes some blurry vision at appointment. Pt needs IV steroids and MRI per Rheum so will be admitted for further management.

## 2025-06-28 NOTE — CONSULT NOTE ADULT - SUBJECTIVE AND OBJECTIVE BOX
PAULA SALTERO  [id number]    HISTORY OF PRESENT ILLNESS:  74M PMH of CLL -- on Venetoclax, BPH, R carotid stenosis 2/2 atherosclerosis, HLD who presents at behest of Ophthalmologist due to possible concern for GCA  Patient seen and examined  He reports starting 6/25, he had episode where he had blurring of vision to the left half of visual field on his left eye. This was also associated with some left-sided headache with some TTP to the scalp near the distribution of temporal artery. He went to his ophthalmologist who noted afferent pupillary defect as well as some pallor of optic disc. Inflammatory markers were also noted to be elevated and he was advised to present to the hospital for further evaluation    He reports here, vision is back to baseline thought still with some TTP over scalp. He notes fatigue which he possibly attributes to taking venetoclax. He has also had persistent cough and rhinorrhea over the past month though this is also improving. He otherwise denies any jaw claudication symptoms, PMR symptoms, fever/chills, weight loss, night sweats, rashes, arthralgia, CP, SOB, n/v/d, or urinary symptoms. No hx Raynaud's      Rheum ROS    Denies fevers/chills/weight loss/night sweats/alopecia/sinus disease/asthma history/oral ulcers/dysphagia/vision changes/Raynauds/VTE/miscarraiges/sicca symptoms/urinary changes/edema/SOB/joint pain/swelling/jaw claudication/rash/photosensitivity/morning stiffness    Endorses fevers/chills/weight loss/night sweats/alopecia/sinus disease/asthma history/oral ulcers/dysphagia/vision changes/Raynauds/VTE/miscarraiges/sicca symptoms/urinary changes/edema/SOB/joint pain/swelling/jaw claudication/rash/photosensitivity/morning stiffness      MEDICATIONS  (STANDING):  ascorbic acid 500 milliGRAM(s) Oral daily  enoxaparin Injectable 40 milliGRAM(s) SubCutaneous every 24 hours  finasteride 5 milliGRAM(s) Oral daily  furosemide    Tablet 20 milliGRAM(s) Oral daily  methylPREDNISolone sodium succinate IVPB 1000 milliGRAM(s) IV Intermittent every 24 hours  multivitamin 1 Tablet(s) Oral daily  PARoxetine 10 milliGRAM(s) Oral daily  sodium chloride 0.9% lock flush 3 milliLiter(s) IV Push every 8 hours  tamsulosin 0.4 milliGRAM(s) Oral at bedtime  venetoclax 400 milliGRAM(s) Oral <User Schedule>    MEDICATIONS  (PRN):      Allergies    Bactrim (Unknown)    Intolerances        PERTINENT MEDICATION HISTORY:    SOCIAL HISTORY:  OCCUPATION:  TRAVEL HISTORY:    FAMILY HISTORY:      Vital Signs Last 24 Hrs  T(C): 36.5 (28 Jun 2025 05:21), Max: 36.7 (27 Jun 2025 20:20)  T(F): 97.7 (28 Jun 2025 05:21), Max: 98.1 (27 Jun 2025 20:20)  HR: 83 (28 Jun 2025 09:13) (66 - 83)  BP: 123/73 (28 Jun 2025 09:13) (100/62 - 123/73)  BP(mean): 75 (28 Jun 2025 05:21) (75 - 88)  RR: 18 (28 Jun 2025 05:21) (18 - 18)  SpO2: 95% (28 Jun 2025 09:13) (93% - 98%)    Parameters below as of 28 Jun 2025 09:13  Patient On (Oxygen Delivery Method): room air        ROS as noted above     Physical Exam:  General: No apparent distress  HEENT: EOMI, MMM  CVS: +S1/S2, RRR, no murmurs/rubs/gallops  Resp: CTA b/l. No crackles/wheezing  GI: Soft, NT/ND +BS  MSK: no swelling/warmth/erythema of the joints of the UE/LE  Neuro: AAOx3  Skin: no visible rashes    LABS:                        13.7   2.92  )-----------( 128      ( 28 Jun 2025 06:43 )             40.7     06-28    139  |  102  |  22  ----------------------------<  173[H]  4.3   |  22  |  0.89    Ca    9.8      28 Jun 2025 06:42    TPro  6.6  /  Alb  4.2  /  TBili  0.4  /  DBili  x   /  AST  14  /  ALT  22  /  AlkPhos  76  06-28    PT/INR - ( 27 Jun 2025 14:12 )   PT: 12.5 sec;   INR: 1.09 ratio         PTT - ( 27 Jun 2025 14:12 )  PTT:32.8 sec  Urinalysis Basic - ( 28 Jun 2025 06:42 )    Color: x / Appearance: x / SG: x / pH: x  Gluc: 173 mg/dL / Ketone: x  / Bili: x / Urobili: x   Blood: x / Protein: x / Nitrite: x   Leuk Esterase: x / RBC: x / WBC x   Sq Epi: x / Non Sq Epi: x / Bacteria: x            Rheumatology Work Up    C-Reactive Protein: 29 mg/L *H* [0 - 4] (06-28-25 @ 06:42)  C-Reactive Protein: 36 mg/L *H* [0 - 4] (06-27-25 @ 14:12)  Sedimentation Rate, Erythrocyte: 32 mm/hr *H* [0 - 15] (06-27-25 @ 14:12)            RADIOLOGY & ADDITIONAL STUDIES:     PAULA AQUINO  [id number]    HISTORY OF PRESENT ILLNESS:  "74M PMH of CLL -- on Venetoclax, BPH, R carotid stenosis 2/2 atherosclerosis, HLD who presents at behest of Ophthalmologist due to possible concern for GCA  Patient seen and examined  He reports starting 6/25, he had episode where he had blurring of vision to the left half of visual field on his left eye. This was also associated with some left-sided headache with some TTP to the scalp near the distribution of temporal artery. He went to his ophthalmologist who noted afferent pupillary defect as well as some pallor of optic disc. Inflammatory markers were also noted to be elevated and he was advised to present to the hospital for further evaluation    He reports here, vision is back to baseline thought still with some TTP over scalp. He notes fatigue which he possibly attributes to taking venetoclax. He has also had persistent cough and rhinorrhea over the past month though this is also improving. He otherwise denies any jaw claudication symptoms, PMR symptoms, fever/chills, weight loss, night sweats, rashes, arthralgia, CP, SOB, n/v/d, or urinary symptoms. No hx Raynaud's"    Further history taken with pt and pt's son at bedside. Pt states that he has L blurry vision that brought him initially to , thought it was pink eye. Referred to optho, and outpt optho exam with L eye disc pallor and afferent pupillary defect. Optho sent pt back to  for inflammatory markers and they were mildly elevated, prompting ED visit for r/o GCA. At present, pt reports he has short bursts of L sided sharp temporal pain that lasts couple seconds and goes away, L blurry vision improved. Denies PMR symptoms, jaw claudication, scalp tenderness. Endorses fatigue, denies weight loss      Rheum ROS  as above      MEDICATIONS  (STANDING):  ascorbic acid 500 milliGRAM(s) Oral daily  enoxaparin Injectable 40 milliGRAM(s) SubCutaneous every 24 hours  finasteride 5 milliGRAM(s) Oral daily  furosemide    Tablet 20 milliGRAM(s) Oral daily  methylPREDNISolone sodium succinate IVPB 1000 milliGRAM(s) IV Intermittent every 24 hours  multivitamin 1 Tablet(s) Oral daily  PARoxetine 10 milliGRAM(s) Oral daily  sodium chloride 0.9% lock flush 3 milliLiter(s) IV Push every 8 hours  tamsulosin 0.4 milliGRAM(s) Oral at bedtime  venetoclax 400 milliGRAM(s) Oral <User Schedule>    MEDICATIONS  (PRN):      Allergies    Bactrim (Unknown)    Intolerances        PERTINENT MEDICATION HISTORY: solumedrol 1g qd    SOCIAL HISTORY: did not obtain  OCCUPATION: did not obtain  TRAVEL HISTORY: none    FAMILY HISTORY: no FH of autoimmune dx      Vital Signs Last 24 Hrs  T(C): 36.5 (28 Jun 2025 05:21), Max: 36.7 (27 Jun 2025 20:20)  T(F): 97.7 (28 Jun 2025 05:21), Max: 98.1 (27 Jun 2025 20:20)  HR: 83 (28 Jun 2025 09:13) (66 - 83)  BP: 123/73 (28 Jun 2025 09:13) (100/62 - 123/73)  BP(mean): 75 (28 Jun 2025 05:21) (75 - 88)  RR: 18 (28 Jun 2025 05:21) (18 - 18)  SpO2: 95% (28 Jun 2025 09:13) (93% - 98%)    Parameters below as of 28 Jun 2025 09:13  Patient On (Oxygen Delivery Method): room air        ROS as noted above     Physical Exam:  General: No apparent distress  HEENT: EOMI, MMM, full temporal pulses  CVS: +S1/S2, RRR, no murmurs/rubs/gallops  Resp: CTA b/l. No crackles/wheezing  GI: Soft, NT/ND +BS  MSK: no swelling/warmth/erythema of the joints of the UE/LE  Neuro: AAOx3  Skin: no visible rashes    LABS:                        13.7   2.92  )-----------( 128      ( 28 Jun 2025 06:43 )             40.7     06-28    139  |  102  |  22  ----------------------------<  173[H]  4.3   |  22  |  0.89    Ca    9.8      28 Jun 2025 06:42    TPro  6.6  /  Alb  4.2  /  TBili  0.4  /  DBili  x   /  AST  14  /  ALT  22  /  AlkPhos  76  06-28    PT/INR - ( 27 Jun 2025 14:12 )   PT: 12.5 sec;   INR: 1.09 ratio         PTT - ( 27 Jun 2025 14:12 )  PTT:32.8 sec  Urinalysis Basic - ( 28 Jun 2025 06:42 )    Color: x / Appearance: x / SG: x / pH: x  Gluc: 173 mg/dL / Ketone: x  / Bili: x / Urobili: x   Blood: x / Protein: x / Nitrite: x   Leuk Esterase: x / RBC: x / WBC x   Sq Epi: x / Non Sq Epi: x / Bacteria: x            Rheumatology Work Up    C-Reactive Protein: 29 mg/L *H* [0 - 4] (06-28-25 @ 06:42)  C-Reactive Protein: 36 mg/L *H* [0 - 4] (06-27-25 @ 14:12)  Sedimentation Rate, Erythrocyte: 32 mm/hr *H* [0 - 15] (06-27-25 @ 14:12)            RADIOLOGY & ADDITIONAL STUDIES:

## 2025-06-28 NOTE — CONSULT NOTE ADULT - ATTENDING COMMENTS
per above
Patient with history of CLL, on chemotherapy (MSK) p/w left eye vision problems and headache. Vascular surgery consulted for temporal artery biopsy to rule out GCA. Patient is otherwise neurologically intact.   -Steroids, treatment of GCA per Rheumatology  -Med/Onc clearance for biopsy (any concerns/risk with current chemotherapy?)
74y male with a past medical history/ocular history of active CLL and prior herpeskeratitis consulted to r/o GCA. Pt with positive GCA symptoms 2 days ago. He had outpt labs done with slightly elevated CRP of 5 but now with repeat labs showing CRP of 35. ESR still pending. Pt with trace APD OS. Rec rheumatology consult. Would recommend IV steroids and TAB. Rec MRI brain and orbits with and without contrast, MRA head and neck. Will follow.

## 2025-06-28 NOTE — PROGRESS NOTE ADULT - ASSESSMENT
Assessment and Recommendations:  74y male with a past medical history/ocular history of active CLL and prior herpeskeratitis consulted for r/o GCA     #R/o Giant Cell Arthritis   - Pt with BCVA 20/40 OD, 20/40 OS, + Trace APD OS, and IOP 17,18  - PT presented to outside ophthalmologist with +APD OS, 3/11 color plates, and decreased vision and headache, now with full color plates, no headache and improved vision. (Still +trace APD OS)  - GCA ROS negative, no scalp tenderness, no pain with chewing, temporal pulses full bilaterally  - Color Plates Full, CVF full, Optic nerve without edema, no hemorrhages   - ESR 27, CRP 36 (6/27) -> 29 (6/28)  - Appreciate Rheumatology recs    DW    Outpatient Follow-up: Patient should follow-up with his/her ophthalmologist or with [facility] Department of Ophthalmology within 1 week of after discharge at:    [address]  [place] [address]  [phone]    Nanette Waddell MD, PGY-2  Contact: [employer] Teams      INCOMPLETE NOTE    Assessment and Recommendations:  74y male with a past medical history/ocular history of active CLL and prior herpeskeratitis consulted for r/o GCA     #R/o Giant Cell Arthritis   - Pt with BCVA 20/40 OD, 20/40 OS, + Trace APD OS, and IOP 17,18  - PT presented to outside ophthalmologist with +APD OS, 3/11 color plates, and decreased vision and headache, now with full color plates, no headache and improved vision. (Still +trace APD OS)  - GCA ROS negative, no scalp tenderness, no pain with chewing, temporal pulses full bilaterally  - Color Plates Full, CVF full, Optic nerve without edema, no hemorrhages   - 6/28 BCVA 20/40 OD, 20/50 OS, no APD OS, IOP 10 OU. EOM full OU, color 11/12 OU, CVF full OD, superotemporal deficit? OS   - ESR 27, CRP 36 (6/27) -> 29 (6/28)  - Appreciate Rheumatology recs    DW    Outpatient Follow-up: Patient should follow-up with his/her ophthalmologist or with [facility] Department of Ophthalmology within 1 week of after discharge at:    [address]  [place] [address]  [phone]    Nanette Waddell MD, PGY-2  Contact: [employer] Teams      INCOMPLETE NOTE    Assessment and Recommendations:  74y male with a past medical history/ocular history of active CLL and prior herpeskeratitis consulted for r/o GCA     #R/o Giant Cell Arthritis   - Pt with BCVA 20/40 OD, 20/40 OS, + Trace APD OS, and IOP 17,18  - PT presented to outside ophthalmologist with +APD OS, 3/11 color plates, and decreased vision and headache, now with full color plates, no headache and improved vision. (Still +trace APD OS)  - GCA ROS negative, no scalp tenderness, no pain with chewing, temporal pulses full bilaterally  - Color Plates Full, CVF full, Optic nerve without edema, no hemorrhages   - 6/28 BCVA 20/40 OD, 20/50 OS, no APD OS, IOP 10 OU. EOM full OU, color 11/12 OU, CVF full OD, superotemporal deficit OS. DFE unchanged from 6/27  - ESR 32 (6/27) -> 27 (6/28), CRP 36 (6/27) -> 29 (6/28)  - Started on solumedrol 1g 6/27 for 3 days   - Appreciate Rheumatology recs  - Temporal artery biopsy TBD   - Will follow up with MRI brain and orbit with/without contrast, MRA Head and neck   - Ophtho will follow     SHIREEN Salinas     Outpatient Follow-up: Patient should follow-up with his/her ophthalmologist or with [facility] Department of Ophthalmology within 1 week of after discharge at:    [address]  [place] [address] [phone]    Nanette Waddell MD, PGY-2  Contact: [employer] Teams Assessment and Recommendations:  74y male with a past medical history/ocular history of active CLL and prior herpeskeratitis consulted for r/o GCA     #R/o Giant Cell Arthritis   - Pt with BCVA 20/40 OD, 20/40 OS, + Trace APD OS, and IOP 17,18  - PT presented to outside ophthalmologist with +APD OS, 3/11 color plates, and decreased vision and headache, now with full color plates, no headache and improved vision. (Still +trace APD OS)  - GCA ROS negative, no scalp tenderness, no pain with chewing, temporal pulses full bilaterally  - Color Plates Full, CVF full, Optic nerve without edema, no hemorrhages   - 6/28 BCVA 20/40 OD, 20/50 OS, no APD OS, IOP 10 OU. EOM full OU, color 11/12 OU, CVF full OD, superotemporal deficit OS. DFE unchanged from 6/27  - ESR 32 (6/27) -> 27 (6/28), CRP 36 (6/27) -> 29 (6/28)  - Started on solumedrol 1g 6/27 for 3 days   - Appreciate Rheumatology recs  - Temporal artery biopsy TBD   - Will follow up with MRI brain and orbit with/without contrast, MRA Head and neck   - Ophtho will follow     DW Dr. Salinas and Dr. Chicas (neuro-ophthalmology attending)     Outpatient Follow-up: Patient should follow-up with his/her ophthalmologist or with [facility] Department of Ophthalmology within 1 week of after discharge at:    [address]  [place] [address]  [phone]    Nanette Waddell MD, PGY-2  Contact: [employer] Teams Assessment and Recommendations:  74y male with a past medical history/ocular history of active CLL and prior herpeskeratitis consulted for r/o GCA     #R/o Giant Cell Arthritis   - Pt with BCVA 20/40 OD, 20/40 OS, + Trace APD OS, and IOP 17,18  - PT presented to outside ophthalmologist with +APD OS, 3/11 color plates, and decreased vision and headache, now with full color plates, no headache and improved vision. (Still +trace APD OS)  - GCA ROS negative, no scalp tenderness, no pain with chewing, temporal pulses full bilaterally  - Color Plates Full, CVF full, Optic nerve without edema, no hemorrhages   - 6/28 BCVA 20/40 OD, 20/50 OS, no APD OS, IOP 10 OU. EOM full OU, color 11/12 OU, CVF full OD, superotemporal deficit OS. DFE with temporal optic nerve pallor OS, no optic nerve edema, no hemorrhages   - ESR 32 (6/27) -> 27 (6/28), CRP 36 (6/27) -> 29 (6/28)  - Started on solumedrol 1g 6/27 for 3 days   - Appreciate Rheumatology recs  - Temporal artery biopsy TBD   - Will follow up with MRI brain and orbit with/without contrast, MRA Head and neck   - Ophtho will follow     DW Dr. Salinas and Dr. Chicas (neuro-ophthalmology attending)     Outpatient Follow-up: Patient should follow-up with his/her ophthalmologist or with [facility] Department of Ophthalmology within 1 week of after discharge at:    [address]  [place] [address]  [phone]    Nanette Waddell MD, PGY-2  Contact: [employer] Teams

## 2025-06-28 NOTE — CONSULT NOTE ADULT - ASSESSMENT
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     Recommendations:  - Appreciate rheumatology recommendations  - Will need heme/onco evaluation for side effect of chemotherapy  - Will need medical/cardiac optimization  - Surgery TBD      Vascular Surgery  [id number]

## 2025-06-28 NOTE — PROGRESS NOTE ADULT - PROBLEM SELECTOR PLAN 1
Admit to Dr. Montanez   Seen by Rheum and Ophtho in ED   recs:  1000mg iv methylprednisolone x 3doses first dose today    MRI brain and orbits; as well, please obtain MRA head/neck  Vascular consult in AM for consideration of left TAB  restart home meds rheum and opth following  IV steroids as per rheum  ESR 32/ now 27--> continue to trend  vasc consulted for possible TAB  MRI brain/orbits and MRA head and neck non con pending for today  discharge planning- home when stable

## 2025-06-28 NOTE — PHYSICAL THERAPY INITIAL EVALUATION ADULT - GENERAL OBSERVATIONS, REHAB EVAL
Pt rec'd semisupine in bed, +telemetry, +IVL, +bed alarm, in NAD.  Pt cleared for PT session by WANDA Mina.

## 2025-06-28 NOTE — PHYSICAL THERAPY INITIAL EVALUATION ADULT - ADDITIONAL COMMENTS
Pt lives alone in a house with 4 CHARLI +rail, first floor set up. PTA pt independent with ambulation and ADLs, denies use of DME.

## 2025-06-28 NOTE — PROGRESS NOTE ADULT - PROBLEM SELECTOR PLAN 2
patient takes home chemo med:  f/u med recc patient takes home chemo med:  venetoclax 400 mg po daily resumed

## 2025-06-28 NOTE — PHYSICAL THERAPY INITIAL EVALUATION ADULT - GAIT DISTANCE, PT EVAL
Pt ambulated 3 steps from bed to chair with CGA (without assistive device), trialed with rolling walker, pt ambulated 50ft x2 with supervision

## 2025-06-28 NOTE — PROGRESS NOTE ADULT - SUBJECTIVE AND OBJECTIVE BOX
[facility] DEPARTMENT OF OPHTHALMOLOGY  ------------------------------------------------------------------------------  Nanette Waddell MD PGY1  Available on Teams  ------------------------------------------------------------------------------    Interval History: No acute events overnight. Today patient denying blurred vision, eye pain, flashes, floaters, FBS, erythema, or discharge.     MEDICATIONS  (STANDING):  ascorbic acid 500 milliGRAM(s) Oral daily  enoxaparin Injectable 40 milliGRAM(s) SubCutaneous every 24 hours  finasteride 5 milliGRAM(s) Oral daily  furosemide    Tablet 20 milliGRAM(s) Oral daily  methylPREDNISolone sodium succinate IVPB 1000 milliGRAM(s) IV Intermittent every 24 hours  metoprolol succinate ER 25 milliGRAM(s) Oral daily  multivitamin 1 Tablet(s) Oral daily  PARoxetine 10 milliGRAM(s) Oral daily  sodium chloride 0.9% lock flush 3 milliLiter(s) IV Push every 8 hours  tamsulosin 0.4 milliGRAM(s) Oral at bedtime  venetoclax 400 milliGRAM(s) Oral <User Schedule>    MEDICATIONS  (PRN):      VITALS: T(C): 37 (06-28-25 @ 11:51)  T(F): 98.6 (06-28-25 @ 11:51), Max: 98.6 (06-28-25 @ 11:51)  HR: 96 (06-28-25 @ 11:51) (66 - 96)  BP: 109/72 (06-28-25 @ 11:51) (100/62 - 123/73)  RR:  (18 - 18)  SpO2:  (93% - 98%)  Wt(kg): --  General: AAO x 3, appropriate mood and affect    Ophthalmology Exam:  Visual acuity (sc): 20/20 OD, 20/20 OS  Pupils: PERRL OU, no APD  Ttono: 16 OD, 16 OS  Extraocular movements (EOMs): Full OU, no pain, no diplopia  Confrontational Visual Field (CVF): Full OU  Color Plates: 12/12 OU     Pen Light Exam (PLE)  External: Flat OU  Lids/Lashes/Lacrimal Ducts: Flat OU    Sclera/Conjunctiva: W+Q OU  Cornea: Cl OU  Anterior Chamber: D+F OU    Iris: Flat OU  Lens: Cl OU   [facility] DEPARTMENT OF OPHTHALMOLOGY  ------------------------------------------------------------------------------  Nanette Waddell MD PGY2  Available on Teams  ------------------------------------------------------------------------------    Interval History: No acute events overnight. Today patient reports eye strain with EOM. No left temple tenderness.     MEDICATIONS  (STANDING):  ascorbic acid 500 milliGRAM(s) Oral daily  enoxaparin Injectable 40 milliGRAM(s) SubCutaneous every 24 hours  finasteride 5 milliGRAM(s) Oral daily  furosemide    Tablet 20 milliGRAM(s) Oral daily  methylPREDNISolone sodium succinate IVPB 1000 milliGRAM(s) IV Intermittent every 24 hours  metoprolol succinate ER 25 milliGRAM(s) Oral daily  multivitamin 1 Tablet(s) Oral daily  PARoxetine 10 milliGRAM(s) Oral daily  sodium chloride 0.9% lock flush 3 milliLiter(s) IV Push every 8 hours  tamsulosin 0.4 milliGRAM(s) Oral at bedtime  venetoclax 400 milliGRAM(s) Oral <User Schedule>    MEDICATIONS  (PRN):      VITALS: T(C): 37 (06-28-25 @ 11:51)  T(F): 98.6 (06-28-25 @ 11:51), Max: 98.6 (06-28-25 @ 11:51)  HR: 96 (06-28-25 @ 11:51) (66 - 96)  BP: 109/72 (06-28-25 @ 11:51) (100/62 - 123/73)  RR:  (18 - 18)  SpO2:  (93% - 98%)  Wt(kg): --  General: AAO x 3, appropriate mood and affect    Ophthalmology Exam:  Visual acuity (cc): 20/50 (ph 20/40) OD, 20/50 (ph NI) OS  Pupils: PERRL OU, no APD  Ttono: 10 OD, 10 OS  Extraocular movements (EOMs): Full OU, no pain, no diplopia  Confrontational Visual Field (CVF): Full OD, superotemporal deficit? OS   Amsler grid full OD, superotemporal blurriness on the grid OS   Color Plates: 11/12 OD, 11/12 OS     Pen Light Exam (PLE)  External: Flat OU  Lids/Lashes/Lacrimal Ducts: Flat OU    Sclera/Conjunctiva: W+Q OU  Cornea: DBUT OU, 1+ spk OU   Anterior Chamber: D+F OU    Iris: Flat OU  Lens: Cl OU   [facility] DEPARTMENT OF OPHTHALMOLOGY  ------------------------------------------------------------------------------  Nanette Waddell MD PGY2  Available on Teams  ------------------------------------------------------------------------------    Interval History: No acute events overnight. Today patient reports eye strain with EOM. No left temple tenderness.     MEDICATIONS  (STANDING):  ascorbic acid 500 milliGRAM(s) Oral daily  enoxaparin Injectable 40 milliGRAM(s) SubCutaneous every 24 hours  finasteride 5 milliGRAM(s) Oral daily  furosemide    Tablet 20 milliGRAM(s) Oral daily  methylPREDNISolone sodium succinate IVPB 1000 milliGRAM(s) IV Intermittent every 24 hours  metoprolol succinate ER 25 milliGRAM(s) Oral daily  multivitamin 1 Tablet(s) Oral daily  PARoxetine 10 milliGRAM(s) Oral daily  sodium chloride 0.9% lock flush 3 milliLiter(s) IV Push every 8 hours  tamsulosin 0.4 milliGRAM(s) Oral at bedtime  venetoclax 400 milliGRAM(s) Oral <User Schedule>    MEDICATIONS  (PRN):      VITALS: T(C): 37 (06-28-25 @ 11:51)  T(F): 98.6 (06-28-25 @ 11:51), Max: 98.6 (06-28-25 @ 11:51)  HR: 96 (06-28-25 @ 11:51) (66 - 96)  BP: 109/72 (06-28-25 @ 11:51) (100/62 - 123/73)  RR:  (18 - 18)  SpO2:  (93% - 98%)  Wt(kg): --  General: AAO x 3, appropriate mood and affect    Ophthalmology Exam:  Visual acuity (cc): 20/50 (ph 20/40) OD, 20/50 (ph NI) OS  Pupils: PERRL OU, no APD  Ttono: 10 OD, 10 OS  Extraocular movements (EOMs): Full OU, no pain, no diplopia  Confrontational Visual Field (CVF): Full OD, superotemporal deficit OS   Amsler grid full OD, superotemporal blurriness on the grid OS   Color Plates: 11/12 OD, 11/12 OS     Pen Light Exam (PLE)  External: Flat OU  Lids/Lashes/Lacrimal Ducts: Flat OU    Sclera/Conjunctiva: W+Q OU  Cornea: DBUT OU  Anterior Chamber: D+F OU    Iris: Flat OU  Lens: cataract OU    Fundus Exam: dilated with 1% tropicamide and 2.5% phenylephrine  Approval obtained from primary team for dilation  Patient aware that pupils can remained dilated for at least 4-6 hours  Exam performed with 20D lens    Vitreous: wnl OU  Disc, cup/disc: 0.35 OU. Mild temporal pallor OS. No disc edema OU.  Macula: wnl OU  Vessels: wnl OU  Periphery: wnl OU

## 2025-06-29 DIAGNOSIS — Z01.818 ENCOUNTER FOR OTHER PREPROCEDURAL EXAMINATION: ICD-10-CM

## 2025-06-29 LAB
ANION GAP SERPL CALC-SCNC: 18 MMOL/L — HIGH (ref 5–17)
BUN SERPL-MCNC: 36 MG/DL — HIGH (ref 7–23)
CALCIUM SERPL-MCNC: 9.5 MG/DL — SIGNIFICANT CHANGE UP (ref 8.4–10.5)
CHLORIDE SERPL-SCNC: 103 MMOL/L — SIGNIFICANT CHANGE UP (ref 96–108)
CO2 SERPL-SCNC: 19 MMOL/L — LOW (ref 22–31)
CREAT SERPL-MCNC: 0.93 MG/DL — SIGNIFICANT CHANGE UP (ref 0.5–1.3)
EGFR: 86 ML/MIN/1.73M2 — SIGNIFICANT CHANGE UP
EGFR: 86 ML/MIN/1.73M2 — SIGNIFICANT CHANGE UP
ERYTHROCYTE [SEDIMENTATION RATE] IN BLOOD: 12 MM/HR — SIGNIFICANT CHANGE UP (ref 0–15)
GLUCOSE SERPL-MCNC: 167 MG/DL — HIGH (ref 70–99)
HBV CORE AB SER-ACNC: SIGNIFICANT CHANGE UP
HCT VFR BLD CALC: 37 % — LOW (ref 39–50)
HGB BLD-MCNC: 12.2 G/DL — LOW (ref 13–17)
MCHC RBC-ENTMCNC: 30 PG — SIGNIFICANT CHANGE UP (ref 27–34)
MCHC RBC-ENTMCNC: 33 G/DL — SIGNIFICANT CHANGE UP (ref 32–36)
MCV RBC AUTO: 90.9 FL — SIGNIFICANT CHANGE UP (ref 80–100)
NRBC # BLD AUTO: 0 K/UL — SIGNIFICANT CHANGE UP (ref 0–0)
NRBC # FLD: 0 K/UL — SIGNIFICANT CHANGE UP (ref 0–0)
NRBC BLD AUTO-RTO: 0 /100 WBCS — SIGNIFICANT CHANGE UP (ref 0–0)
PLATELET # BLD AUTO: 137 K/UL — LOW (ref 150–400)
PMV BLD: 9.1 FL — SIGNIFICANT CHANGE UP (ref 7–13)
POTASSIUM SERPL-MCNC: 3.9 MMOL/L — SIGNIFICANT CHANGE UP (ref 3.5–5.3)
POTASSIUM SERPL-SCNC: 3.9 MMOL/L — SIGNIFICANT CHANGE UP (ref 3.5–5.3)
RBC # BLD: 4.07 M/UL — LOW (ref 4.2–5.8)
RBC # FLD: 13 % — SIGNIFICANT CHANGE UP (ref 10.3–14.5)
SODIUM SERPL-SCNC: 140 MMOL/L — SIGNIFICANT CHANGE UP (ref 135–145)
WBC # BLD: 5.88 K/UL — SIGNIFICANT CHANGE UP (ref 3.8–10.5)
WBC # FLD AUTO: 5.88 K/UL — SIGNIFICANT CHANGE UP (ref 3.8–10.5)

## 2025-06-29 PROCEDURE — 93010 ELECTROCARDIOGRAM REPORT: CPT

## 2025-06-29 PROCEDURE — 99223 1ST HOSP IP/OBS HIGH 75: CPT

## 2025-06-29 PROCEDURE — 93998 UNLISTD NONINVAS VASC DX STD: CPT | Mod: 26

## 2025-06-29 PROCEDURE — 99232 SBSQ HOSP IP/OBS MODERATE 35: CPT

## 2025-06-29 RX ORDER — AMOXICILLIN AND CLAVULANATE POTASSIUM 500; 125 MG/1; MG/1
1 TABLET, FILM COATED ORAL EVERY 12 HOURS
Refills: 0 | Status: DISCONTINUED | OUTPATIENT
Start: 2025-06-29 | End: 2025-07-02

## 2025-06-29 RX ORDER — VENETOCLAX 10 MG/1
400 TABLET, FILM COATED ORAL
Refills: 0 | Status: DISCONTINUED | OUTPATIENT
Start: 2025-06-29 | End: 2025-07-01

## 2025-06-29 RX ORDER — METHYLPREDNISOLONE ACETATE 80 MG/ML
60 INJECTION, SUSPENSION INTRA-ARTICULAR; INTRALESIONAL; INTRAMUSCULAR; SOFT TISSUE DAILY
Refills: 0 | Status: DISCONTINUED | OUTPATIENT
Start: 2025-06-30 | End: 2025-07-02

## 2025-06-29 RX ADMIN — METOPROLOL SUCCINATE 25 MILLIGRAM(S): 50 TABLET, EXTENDED RELEASE ORAL at 05:41

## 2025-06-29 RX ADMIN — VENETOCLAX 400 MILLIGRAM(S): 10 TABLET, FILM COATED ORAL at 21:04

## 2025-06-29 RX ADMIN — Medication 500 MILLIGRAM(S): at 10:54

## 2025-06-29 RX ADMIN — AMOXICILLIN AND CLAVULANATE POTASSIUM 1 TABLET(S): 500; 125 TABLET, FILM COATED ORAL at 18:42

## 2025-06-29 RX ADMIN — ENOXAPARIN SODIUM 40 MILLIGRAM(S): 100 INJECTION SUBCUTANEOUS at 05:42

## 2025-06-29 RX ADMIN — FUROSEMIDE 20 MILLIGRAM(S): 10 INJECTION INTRAMUSCULAR; INTRAVENOUS at 05:41

## 2025-06-29 RX ADMIN — AMOXICILLIN AND CLAVULANATE POTASSIUM 1 TABLET(S): 500; 125 TABLET, FILM COATED ORAL at 13:44

## 2025-06-29 RX ADMIN — PAROXETINE HYDROCHLORIDE 10 MILLIGRAM(S): 20 TABLET, FILM COATED ORAL at 21:06

## 2025-06-29 RX ADMIN — Medication 40 MILLIGRAM(S): at 05:42

## 2025-06-29 RX ADMIN — TAMSULOSIN HYDROCHLORIDE 0.4 MILLIGRAM(S): 0.4 CAPSULE ORAL at 21:05

## 2025-06-29 RX ADMIN — Medication 3 MILLILITER(S): at 15:45

## 2025-06-29 RX ADMIN — Medication 3 MILLILITER(S): at 09:27

## 2025-06-29 RX ADMIN — METHYLPREDNISOLONE ACETATE 50 MILLIGRAM(S): 80 INJECTION, SUSPENSION INTRA-ARTICULAR; INTRALESIONAL; INTRAMUSCULAR; SOFT TISSUE at 10:54

## 2025-06-29 RX ADMIN — Medication 1 TABLET(S): at 10:54

## 2025-06-29 RX ADMIN — Medication 3 MILLILITER(S): at 21:07

## 2025-06-29 RX ADMIN — FINASTERIDE 5 MILLIGRAM(S): 1 TABLET, FILM COATED ORAL at 21:05

## 2025-06-29 NOTE — PROGRESS NOTE ADULT - ASSESSMENT
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  Patient with reported history of left-sided headache and temporal tenderness over the 2 days.  He was seen by ophthalmology who performed full exam and referred patient to ED for further work up. Pt notes some blurry vision at appointment. He denies current HA, dizziness, chest pain, sob, abd pain, n/v/d, numbness or weakness seen in the ED by opthalmology and rheumatology. elevated CRP, needs IV steroids and MRI per Rheum so will be admitted for further management   6/28 VSS; RSR ; IV steroids as per rheum; ESR 32/ now 27--> continue to trend; vasc consulted for possible TAB; MRI brain/orbits and MRA head and neck non con pending for today  6/29 VSS hemeonc & medicine called for clearance.  Augmentin 875 bid for sinusitis  discharge planning- home when stable

## 2025-06-29 NOTE — CONSULT NOTE ADULT - SUBJECTIVE AND OBJECTIVE BOX
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  Patient with reported history of left-sided headache and temporal tenderness over the 2 days.  He was seen by ophthalmology who performed full exam and referred patient to ED for further work up. Pt notes some blurry vision at appointment. He denies current HA, dizziness, chest pain, sob, abd pain, n/v/d, numbness or weakness seen in the ED by opthalmology and rheumatology. elevated CRP, needs IV steroids and MRI per Rheum so will be admitted for further management  (27 Jun 2025 20:44)      SUBJECTIVE: Pt without any current complaints. Denies chest pain, abdominal pain, SOB. He currently does not have any headaches though he says they are intermittent. No blurriness in vision. He has taken his evening dose of venetoclax.     PAST MEDICAL & SURGICAL HISTORY:  CLL (chronic lymphocytic leukemia)      History of BPH      H/O excision of mass  R hip      Review of Systems:   CONSTITUTIONAL: No fever, weight loss, or fatigue  EYES: No eye pain, visual disturbances, or discharge  ENMT:  No difficulty hearing, tinnitus, vertigo; No sinus or throat pain  RESPIRATORY: No cough, wheezing, chills or hemoptysis; No shortness of breath  CARDIOVASCULAR: No chest pain, palpitations, dizziness, or leg swelling  GASTROINTESTINAL: No abdominal or epigastric pain. No nausea, vomiting, or hematemesis; No diarrhea or constipation. No melena or hematochezia.  GENITOURINARY: No dysuria, frequency, hematuria, or incontinence  NEUROLOGICAL: No headaches, memory loss, loss of strength, numbness, or tremors  SKIN: No itching, burning, rashes, or lesions   MUSCULOSKELETAL: No joint pain or swelling; No muscle, back, or extremity pain  PSYCHIATRIC: No depression, anxiety, mood swings, or difficulty sleeping  HEME/LYMPH: No easy bruising, or bleeding gums      Allergies    Bactrim (Unknown)    Intolerances    Social History:     FAMILY HISTORY:   Noncontributory    CAPILLARY BLOOD GLUCOSE        Vital Signs Last 24 Hrs  T(C): 36.5 (29 Jun 2025 20:38), Max: 36.5 (29 Jun 2025 05:00)  T(F): 97.7 (29 Jun 2025 20:38), Max: 97.7 (29 Jun 2025 05:00)  HR: 68 (29 Jun 2025 20:38) (64 - 79)  BP: 111/55 (29 Jun 2025 20:38) (98/60 - 126/74)  BP(mean): 74 (29 Jun 2025 20:38) (74 - 74)  RR: 18 (29 Jun 2025 20:38) (18 - 18)  SpO2: 96% (29 Jun 2025 20:38) (93% - 98%)    Parameters below as of 29 Jun 2025 20:38  Patient On (Oxygen Delivery Method): room air      PHYSICAL EXAM:  GENERAL:  Well appearing, in NAD  HEAD:  NCAT  EYES: PERRLA, conjunctiva clear  NECK: Supple, No JVD  CHEST/LUNG: CTA B/L. No w/r/r.  HEART: Reg rate. Normal S1, S2. No m/r/g.   ABDOMEN: soft, nontender. Bowel sounds present  EXTREMITIES:  2+ Peripheral Pulses, No clubbing, cyanosis, edema.  PSYCH: AAOx3, appropriate affect  NEUROLOGY: grossly non-focal  SKIN: No rashes or lesions    LABS:                        12.2   5.88  )-----------( 137      ( 29 Jun 2025 08:01 )             37.0     06-29    140  |  103  |  36[H]  ----------------------------<  167[H]  3.9   |  19[L]  |  0.93    Ca    9.5      29 Jun 2025 08:01    TPro  6.6  /  Alb  4.2  /  TBili  0.4  /  DBili  x   /  AST  14  /  ALT  22  /  AlkPhos  76  06-28      Urinalysis Basic - ( 29 Jun 2025 08:01 )    Color: x / Appearance: x / SG: x / pH: x  Gluc: 167 mg/dL / Ketone: x  / Bili: x / Urobili: x   Blood: x / Protein: x / Nitrite: x   Leuk Esterase: x / RBC: x / WBC x   Sq Epi: x / Non Sq Epi: x / Bacteria: x      MEDICATIONS  (STANDING):  amoxicillin  875 milliGRAM(s)/clavulanate 1 Tablet(s) Oral every 12 hours  ascorbic acid 500 milliGRAM(s) Oral daily  enoxaparin Injectable 40 milliGRAM(s) SubCutaneous every 24 hours  finasteride 5 milliGRAM(s) Oral daily  furosemide    Tablet 20 milliGRAM(s) Oral daily  methylPREDNISolone sodium succinate IVPB 1000 milliGRAM(s) IV Intermittent every 24 hours  metoprolol succinate ER 25 milliGRAM(s) Oral daily  multivitamin 1 Tablet(s) Oral daily  pantoprazole    Tablet 40 milliGRAM(s) Oral before breakfast  PARoxetine 10 milliGRAM(s) Oral daily  sodium chloride 0.9% lock flush 3 milliLiter(s) IV Push every 8 hours  tamsulosin 0.4 milliGRAM(s) Oral at bedtime  venetoclax 400 milliGRAM(s) Oral <User Schedule>    MEDICATIONS  (PRN):      Home Medications:  finasteride 5 mg oral tablet: 1 tab(s) orally once a day (26 Mar 2021 11:19)  PARoxetine 10 mg oral tablet: 1 tab(s) orally once a day (26 Mar 2021 11:19)  tamsulosin 0.4 mg oral capsule: 1 cap(s) orally once a day (at bedtime) (26 Mar 2021 11:19)

## 2025-06-29 NOTE — PROGRESS NOTE ADULT - PROBLEM SELECTOR PLAN 1
rheum and opth following  IV steroids as per rheum  ESR 32/ now 27--> continue to trend  vasc consulted for possible TAB  MRI brain/orbits and MRA head and neck non con pending for today  discharge planning- home when stable

## 2025-06-29 NOTE — CHART NOTE - NSCHARTNOTEFT_GEN_A_CORE
Received a teams message for hematology consult request regarding this patient who is planned for temporal artery biopsy  and recommendations regarding Venetoclax mgt for his CLL. This patient was previously followed at [facility] and currently with [facility] (Osito Liu).  As per policy, [place] hematology does not consult on patients actively managed by [facility] and [facility].     Attempted to contact primary team via teams to inform of this - no response received.     For patients followed at [facility], the team needs to contact [facility] who provides coverage. Contact info is on Amion.    Treasure Morrison MD MS  Hematology/Oncology Fellow PGY-4  [employer] at [facility] | [facility] | [facility]  Available on Teams

## 2025-06-29 NOTE — CONSULT NOTE ADULT - PROBLEM SELECTOR RECOMMENDATION 9
Pt currently without cardiac or respiratory symptoms. Vitals wnl. RCRI score 0, Trevizo perioperative risk 0.2%, overall low cardiac risk for procedure. No further cardiac testing indicated.  - Labs reviewed - no significant electrolyte abnormalities, no renal or hepatic dysfunction  - EKG reviewed - sinus rhythm, no acute ischemic changes  - No contraindication for procedure from medical perspective. Pt still pending hematological evaluation.

## 2025-06-29 NOTE — CONSULT NOTE ADULT - PROBLEM SELECTOR RECOMMENDATION 3
Follows providers from [facility], [facility]. On venetoclax, pt took dose 6/29 evening. CBC stable.   - F/u [facility] onc consult for medication recommendations.

## 2025-06-29 NOTE — CONSULT NOTE ADULT - PROBLEM SELECTOR RECOMMENDATION 2
Currently no headaches, no vision changes.   - Continue solu-medrol per ophthalmology and rheumatology  - Temporal artery biopsy per vascular  - Continue PPI while on steroids Billing Type: Third-Party Bill

## 2025-06-29 NOTE — CONSULT NOTE ADULT - ASSESSMENT
74 year old male with hx of CLL on venetoclax, BPH, prior herpeskeratitis, presenting from ophthalmology office for concerns of possible temporal arteritis. Pt admitted for further management with steroids. Now planned for temporal artery biopsy. Medicine consulted for pre-operative evaluation and risk stratification.

## 2025-06-29 NOTE — CHART NOTE - NSCHARTNOTEFT_GEN_A_CORE
Discussed case with   Dr. Avinash Oneal from [facility]  clinical data reviewed   discussed diagnosis of Giant Cell Arteritis and vascular plan     Preliminary recommendations:  Hold VENETOCLAX 400mg tonight    Per Dr. Oneal: Venetoclax not an increased risk of bleeding     Dr. Pablo and [facility] nurse practitioner to complete formal consult in AM    CTS NP Discussed case with   Dr. Avinash Oneal from [facility]  clinical data reviewed   discussed diagnosis of Giant Cell Arteritis and vascular plan   Per Dr. Jm Pablo and [facility] nurse practitioner to complete formal consult in AM    [facility] contacted  Case discussed with   Dr. Foley medical oncology fellow  Dr. Foley will contact Dr. LISETH Liu regarding hospitalized patient    Await consult     CTS NP Discussed case with   Dr. Avinash Oneal from [facility]  clinical data reviewed   discussed diagnosis of Giant Cell Arteritis and vascular plan   Per Dr. Jm Pablo and [facility] On[facility] nurse practitioner to complete formal consult in AM    [facility] contacted  Case discussed with   Dr. Foley medical oncology fellow  Dr. Foley will contact Dr. LISETH Liu regarding hospitalized patient    Await consult and recommendations prior to proceeding with biopsy     CTS NP

## 2025-06-29 NOTE — PROGRESS NOTE ADULT - SUBJECTIVE AND OBJECTIVE BOX
VITAL SIGNS  Tele:  SR 60-80  Vital Signs Last 24 Hrs  T(C): 36.4 (2025 10:47), Max: 36.7 (2025 20:01)  HR: 72 (2025 10:47) (64 - 80)  BP: 126/74 (2025 10:47) (98/60 - 126/74)  BP(mean): 74 (2025 05:45) (74 - 86)  SpO2: 98% (2025 10:47) (93% - 98%)             @ 07:  -   @ 07:00  --------------------------------------------------------  IN: 1100 mL / OUT: 1224 mL / NET: -124 mL     @ 07: @ 12:46  --------------------------------------------------------  IN: 360 mL / OUT: 200 mL / NET: 160 mL    Daily Weight in k.4 (2025 07:46)  Admit Wt: Drug Dosing Weight  Height (cm): 160 (2025 12:49)  Weight (kg): 68 (2025 12:49)  BMI (kg/m2): 26.6 (2025 12:49)  BSA (m2): 1.71 (2025 12:49)    MEDICATIONS  amoxicillin  875 milliGRAM(s)/clavulanate 1 Tablet(s) Oral every 12 hours  ascorbic acid 500 milliGRAM(s) Oral daily  enoxaparin Injectable 40 milliGRAM(s) SubCutaneous every 24 hours  finasteride 5 milliGRAM(s) Oral daily  furosemide    Tablet 20 milliGRAM(s) Oral daily  methylPREDNISolone sodium succinate IVPB 1000 milliGRAM(s) IV Intermittent every 24 hours  metoprolol succinate ER 25 milliGRAM(s) Oral daily  multivitamin 1 Tablet(s) Oral daily  pantoprazole    Tablet 40 milliGRAM(s) Oral before breakfast  PARoxetine 10 milliGRAM(s) Oral daily  sodium chloride 0.9% lock flush 3 milliLiter(s) IV Push every 8 hours  tamsulosin 0.4 milliGRAM(s) Oral at bedtime  venetoclax 400 milliGRAM(s) Oral <User Schedule>    PHYSICAL EXAM  Subjective:  Neurology: alert and oriented x 3, nonfocal, no gross deficits  CV : S1S2  Lungs: CTA B/L   Abdomen: soft, nontender, nondistended, positive bowel sounds x 4 quadrants, LBM: +bm  :     Voiding   Extremities:   B/L LE  Negative calf tenderness; (+) 2 DP palpable  no edema      LABS       140  |  103  |  36[H]  ----------------------------<  167[H]  3.9   |  19[L]  |  0.93               12.2   5.88  )-----------( 137      ( 2025 08:01 )             37.0          PT/INR - ( 2025 14:12 )   PT: 12.5 sec;   INR: 1.09 ratio         PTT - ( 2025 14:12 )  PTT:32.8 sec        PAST MEDICAL & SURGICAL HISTORY:  CLL (chronic lymphocytic leukemia)  History of BPH  H/O excision of mass  R hip        Discussed with Team at AM rounds.

## 2025-06-30 LAB
ANION GAP SERPL CALC-SCNC: 14 MMOL/L — SIGNIFICANT CHANGE UP (ref 5–17)
BASOPHILS # BLD AUTO: 0 K/UL — SIGNIFICANT CHANGE UP (ref 0–0.2)
BASOPHILS NFR BLD AUTO: 0 % — SIGNIFICANT CHANGE UP (ref 0–2)
BUN SERPL-MCNC: 28 MG/DL — HIGH (ref 7–23)
CALCIUM SERPL-MCNC: 8.7 MG/DL — SIGNIFICANT CHANGE UP (ref 8.4–10.5)
CHLORIDE SERPL-SCNC: 107 MMOL/L — SIGNIFICANT CHANGE UP (ref 96–108)
CO2 SERPL-SCNC: 20 MMOL/L — LOW (ref 22–31)
CREAT SERPL-MCNC: 0.9 MG/DL — SIGNIFICANT CHANGE UP (ref 0.5–1.3)
EGFR: 89 ML/MIN/1.73M2 — SIGNIFICANT CHANGE UP
EGFR: 89 ML/MIN/1.73M2 — SIGNIFICANT CHANGE UP
EOSINOPHIL # BLD AUTO: 0 K/UL — SIGNIFICANT CHANGE UP (ref 0–0.5)
EOSINOPHIL NFR BLD AUTO: 0 % — SIGNIFICANT CHANGE UP (ref 0–6)
GLUCOSE SERPL-MCNC: 165 MG/DL — HIGH (ref 70–99)
HCT VFR BLD CALC: 35.2 % — LOW (ref 39–50)
HGB BLD-MCNC: 11.8 G/DL — LOW (ref 13–17)
IMM GRANULOCYTES # BLD AUTO: 0.05 K/UL — SIGNIFICANT CHANGE UP (ref 0–0.07)
IMM GRANULOCYTES NFR BLD AUTO: 1 % — HIGH (ref 0–0.9)
LYMPHOCYTES # BLD AUTO: 0.19 K/UL — LOW (ref 1–3.3)
LYMPHOCYTES NFR BLD AUTO: 3.7 % — LOW (ref 13–44)
MCHC RBC-ENTMCNC: 30.6 PG — SIGNIFICANT CHANGE UP (ref 27–34)
MCHC RBC-ENTMCNC: 33.5 G/DL — SIGNIFICANT CHANGE UP (ref 32–36)
MCV RBC AUTO: 91.4 FL — SIGNIFICANT CHANGE UP (ref 80–100)
MONOCYTES # BLD AUTO: 0.27 K/UL — SIGNIFICANT CHANGE UP (ref 0–0.9)
MONOCYTES NFR BLD AUTO: 5.3 % — SIGNIFICANT CHANGE UP (ref 2–14)
NEUTROPHILS # BLD AUTO: 4.57 K/UL — SIGNIFICANT CHANGE UP (ref 1.8–7.4)
NEUTROPHILS NFR BLD AUTO: 90 % — HIGH (ref 43–77)
NRBC # BLD AUTO: 0 K/UL — SIGNIFICANT CHANGE UP (ref 0–0)
NRBC # FLD: 0 K/UL — SIGNIFICANT CHANGE UP (ref 0–0)
NRBC BLD AUTO-RTO: 0 /100 WBCS — SIGNIFICANT CHANGE UP (ref 0–0)
PLATELET # BLD AUTO: 124 K/UL — LOW (ref 150–400)
PMV BLD: 8.9 FL — SIGNIFICANT CHANGE UP (ref 7–13)
POTASSIUM SERPL-MCNC: 3.6 MMOL/L — SIGNIFICANT CHANGE UP (ref 3.5–5.3)
POTASSIUM SERPL-SCNC: 3.6 MMOL/L — SIGNIFICANT CHANGE UP (ref 3.5–5.3)
RBC # BLD: 3.85 M/UL — LOW (ref 4.2–5.8)
RBC # FLD: 13.2 % — SIGNIFICANT CHANGE UP (ref 10.3–14.5)
SODIUM SERPL-SCNC: 141 MMOL/L — SIGNIFICANT CHANGE UP (ref 135–145)
WBC # BLD: 5.08 K/UL — SIGNIFICANT CHANGE UP (ref 3.8–10.5)
WBC # FLD AUTO: 5.08 K/UL — SIGNIFICANT CHANGE UP (ref 3.8–10.5)

## 2025-06-30 PROCEDURE — 99232 SBSQ HOSP IP/OBS MODERATE 35: CPT

## 2025-06-30 RX ADMIN — FUROSEMIDE 20 MILLIGRAM(S): 10 INJECTION INTRAMUSCULAR; INTRAVENOUS at 05:46

## 2025-06-30 RX ADMIN — Medication 40 MILLIGRAM(S): at 05:50

## 2025-06-30 RX ADMIN — TAMSULOSIN HYDROCHLORIDE 0.4 MILLIGRAM(S): 0.4 CAPSULE ORAL at 22:00

## 2025-06-30 RX ADMIN — Medication 3 MILLILITER(S): at 06:50

## 2025-06-30 RX ADMIN — AMOXICILLIN AND CLAVULANATE POTASSIUM 1 TABLET(S): 500; 125 TABLET, FILM COATED ORAL at 17:38

## 2025-06-30 RX ADMIN — FINASTERIDE 5 MILLIGRAM(S): 1 TABLET, FILM COATED ORAL at 22:01

## 2025-06-30 RX ADMIN — Medication 1 TABLET(S): at 08:31

## 2025-06-30 RX ADMIN — Medication 500 MILLIGRAM(S): at 08:31

## 2025-06-30 RX ADMIN — METOPROLOL SUCCINATE 25 MILLIGRAM(S): 50 TABLET, EXTENDED RELEASE ORAL at 08:31

## 2025-06-30 RX ADMIN — AMOXICILLIN AND CLAVULANATE POTASSIUM 1 TABLET(S): 500; 125 TABLET, FILM COATED ORAL at 05:48

## 2025-06-30 RX ADMIN — Medication 3 MILLILITER(S): at 08:03

## 2025-06-30 RX ADMIN — PAROXETINE HYDROCHLORIDE 10 MILLIGRAM(S): 20 TABLET, FILM COATED ORAL at 22:00

## 2025-06-30 RX ADMIN — ENOXAPARIN SODIUM 40 MILLIGRAM(S): 100 INJECTION SUBCUTANEOUS at 05:46

## 2025-06-30 RX ADMIN — METHYLPREDNISOLONE ACETATE 60 MILLIGRAM(S): 80 INJECTION, SUSPENSION INTRA-ARTICULAR; INTRALESIONAL; INTRAMUSCULAR; SOFT TISSUE at 05:45

## 2025-06-30 RX ADMIN — VENETOCLAX 400 MILLIGRAM(S): 10 TABLET, FILM COATED ORAL at 18:43

## 2025-06-30 RX ADMIN — Medication 3 MILLILITER(S): at 21:56

## 2025-06-30 NOTE — CONSULT NOTE ADULT - ASSESSMENT
----- Message from [place] sent at 2/19/2025  4:14 PM CST -----  Type:  RX Refill RequestWho Called: [facility]/ [place] or New Rx:RefillRX Name and Strength:semaglutide, weight loss, (WEGOVY) 1.7 mg/0.75 mL PnIjHow is the patient currently taking it? (ex. 1XDay):Is this a 30 day or 90 day RX:Preferred Pharmacy with phone number:[place], [place] - [address] [facility] [id number] [facility] [place] [address]Xmxyf: [phone] Fax: [phone] Local or Mail Order:localOrdering Provider:courtney Bedolla the patient rather a call back or a response via [facility]? 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 He was seen by ophthalmology who performed full exam and referred patient to ED for further work up.     CLL  - Completed C6 of [place] on 4/7/25. Remains on Venetoclax 400mg daily. [place] [facility]   - Awaiting input from his primary oncologist on whether he should hold Venetoclax pending biopsy    Giant cell arteritis  - MRI brain/orbits and MRA head: No evidence of acute infarct, intracranial blood products, mass effect or midline shift. Possible purulent bacterial sinusitis.   - Duplex of temporal artery: Thickened appearance of bilateral temporal arteries, right greater than left, with mild surrounding soft tissue edema, indeterminate for arteritis.   - Rheum, opthalmology and vascular following. Continues IV steroids, pending biopsy.    Will continue to follow.    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 He was seen by ophthalmology who performed full exam and referred patient to ED for further work up.     CLL  - Completed C6 of [place] on 4/7/25. Remains on Venetoclax 400mg daily. [place] with [place]. Osito [place] [facility]   - Please continue Venetoclax     Giant cell arteritis  - MRI brain/orbits and MRA head: No evidence of acute infarct, intracranial blood products, mass effect or midline shift. Possible purulent bacterial sinusitis.   - Duplex of temporal artery: Thickened appearance of bilateral temporal arteries, right greater than left, with mild surrounding soft tissue edema, indeterminate for arteritis.   - Rheum, opthalmology and vascular following. Continues IV steroids, pending biopsy.    Will continue to follow.    Santiago Broussard PA-C  Hematology/Oncology  [facility]  [phone] (office)  Evenings and weekends please call MD on call or office

## 2025-06-30 NOTE — PROGRESS NOTE ADULT - SUBJECTIVE AND OBJECTIVE BOX
VITAL SIGNS  Tele:  SR 60-80  Vital Signs Last 24 Hrs  T(C): 36.4 (2025 05:23), Max: 36.5 (2025 20:38)  HR: 54 (2025 05:23) (54 - 72)  BP: 106/60 (2025 05:23) (106/60 - 126/74)  BP(mean): 80 (2025 20:38) (80 - 80)  SpO2: 96% (2025 05:23) (96% - 98%)             @ 07:  -   @ 07:00  --------------------------------------------------------  IN: 1070 mL / OUT: 1650 mL / NET: -580 mL     @ 07: @ 08:37  --------------------------------------------------------  IN: 0 mL / OUT: 150 mL / NET: -150 mL    Daily Weight in k.1 (2025 05:23)  Admit Wt: Drug Dosing Weight  Height (cm): 160 (2025 12:49)  Weight (kg): 68 (2025 12:49)  BMI (kg/m2): 26.6 (2025 12:49)  BSA (m2): 1.71 (2025 12:49)                  MEDICATIONS  amoxicillin  875 milliGRAM(s)/clavulanate 1 Tablet(s) Oral every 12 hours  ascorbic acid 500 milliGRAM(s) Oral daily  enoxaparin Injectable 40 milliGRAM(s) SubCutaneous every 24 hours  finasteride 5 milliGRAM(s) Oral daily  furosemide    Tablet 20 milliGRAM(s) Oral daily  methylPREDNISolone sodium succinate Injectable 60 milliGRAM(s) IV Push daily  methylPREDNISolone sodium succinate IVPB 1000 milliGRAM(s) IV Intermittent every 24 hours  metoprolol succinate ER 25 milliGRAM(s) Oral daily  multivitamin 1 Tablet(s) Oral daily  pantoprazole    Tablet 40 milliGRAM(s) Oral before breakfast  PARoxetine 10 milliGRAM(s) Oral daily  sodium chloride 0.9% lock flush 3 milliLiter(s) IV Push every 8 hours  tamsulosin 0.4 milliGRAM(s) Oral at bedtime  venetoclax 400 milliGRAM(s) Oral <User Schedule>    PHYSICAL EXAM  Subjective:  Neurology: alert and oriented x 3, nonfocal, no gross deficits  CV : S1S2  Lungs: CTA B/L   Abdomen: soft, nontender, nondistended, positive bowel sounds x 4 quadrants, LBM:   :     Voiding   Extremities:   B/L LE  Negative calf tenderness; (+) 2 DP palpable  no edema      LABS       141  |  107  |  28[H]  ----------------------------<  165[H]  3.6   |  20[L]  |  0.90                            11.8   5.08  )-----------( 124      ( 2025 06:53 )             35.2         PAST MEDICAL & SURGICAL HISTORY:  CLL (chronic lymphocytic leukemia)      History of BPH      H/O excision of mass  R hip           Discussed with Cardiothoracic Team at AM rounds.

## 2025-06-30 NOTE — PROGRESS NOTE ADULT - ASSESSMENT
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  Patient with reported history of left-sided headache and temporal tenderness over the 2 days.  He was seen by ophthalmology who performed full exam and referred patient to ED for further work up. Pt notes some blurry vision at appointment. He denies current HA, dizziness, chest pain, sob, abd pain, n/v/d, numbness or weakness seen in the ED by opthalmology and rheumatology. elevated CRP, needs IV steroids and MRI per Rheum so will be admitted for further management   6/28 VSS; RSR ; IV steroids as per rheum; ESR 32/ now 27--> continue to trend; vasc consulted for possible TAB; MRI brain/orbits and MRA head and neck non con pending for today  6/29 VSS hemeonc & medicine called for clearance.  Augmentin 875 bid for sinusitis  6/30  Temporal artery biopsy on hold until hemeonc input regarding chemo- place on hold for procedure or continue?  patient does not want to hold his chemo medication.    discharge planning- home when stable

## 2025-06-30 NOTE — PROGRESS NOTE ADULT - SUBJECTIVE AND OBJECTIVE BOX
VASCULAR SURGERY DAILY PROGRESS NOTE    SUBJECTIVE: NAEO      OBJECTIVE:  Vital Signs Last 24 Hrs  T(C): 36.4 (30 Jun 2025 05:23), Max: 36.5 (29 Jun 2025 20:38)  T(F): 97.6 (30 Jun 2025 05:23), Max: 97.7 (29 Jun 2025 20:38)  HR: 54 (30 Jun 2025 05:23) (54 - 72)  BP: 106/60 (30 Jun 2025 05:23) (106/60 - 126/74)  BP(mean): 80 (29 Jun 2025 20:38) (80 - 80)  RR: 18 (30 Jun 2025 05:23) (18 - 18)  SpO2: 96% (30 Jun 2025 05:23) (96% - 98%)    Parameters below as of 30 Jun 2025 05:23  Patient On (Oxygen Delivery Method): room air        I&O's Summary    29 Jun 2025 07:01  -  30 Jun 2025 07:00  --------------------------------------------------------  IN: 1070 mL / OUT: 1650 mL / NET: -580 mL        Physical Exam:  General Appearance: Appears well, NAD   Chest: Nonlabored breathing   CV: Hemodynamically stable  Extremities: Grossly symmetric, [facility]     LABS:                        12.2   5.88  )-----------( 137      ( 29 Jun 2025 08:01 )             37.0     06-29    140  |  103  |  36[H]  ----------------------------<  167[H]  3.9   |  19[L]  |  0.93    Ca    9.5      29 Jun 2025 08:01        Urinalysis Basic - ( 29 Jun 2025 08:01 )    Color: x / Appearance: x / SG: x / pH: x  Gluc: 167 mg/dL / Ketone: x  / Bili: x / Urobili: x   Blood: x / Protein: x / Nitrite: x   Leuk Esterase: x / RBC: x / WBC x   Sq Epi: x / Non Sq Epi: x / Bacteria: x        RADIOLOGY & ADDITIONAL STUDIES:

## 2025-06-30 NOTE — PROGRESS NOTE ADULT - ATTENDING COMMENTS
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 ESR elevated on admission. Leukopenic with left shift without other signs and symptoms of infection.   Duplex of temporal artery with arterial wall thickening and edema; suspicious for temporal arteritis. Currently on steroids.    Plan  - plan for OR for TAB on 7/2/25  - Cont steroids   - Medical optimization for surgery  - Patient is high-risk for wound healing complications given his history of CLL treatment and high-dose steroid administration

## 2025-06-30 NOTE — PROGRESS NOTE ADULT - ASSESSMENT
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     Recommendations:  - OR planning for TABx, date TBD pending availability  - Medical optimization appreciated  - Appreciate rheumatology recommendations  - Appreciate heme/onc recommendations   - Remainder of care per primary    Vascular Surgery  [id number]

## 2025-06-30 NOTE — CONSULT NOTE ADULT - SUBJECTIVE AND OBJECTIVE BOX
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  Patient with reported history of left-sided headache and temporal tenderness over the 2 days.  He was seen by ophthalmology who performed full exam and referred patient to ED for further work up. Pt notes some blurry vision at appointment. He denies current HA, dizziness, chest pain, sob, abd pain, n/v/d, numbness or weakness seen in the ED by opthalmology and rheumatology. elevated CRP, needs IV steroids and MRI per Rheum so will be admitted for further management.    Hematology/oncology consulted on this 73 y/o male with CLL, completed C6 of Obina on 4/7/25. Remains on Venetoclax 400mg daily. [place] [facility] [place] seen and examined, feels well. His headache is improved.     PAST MEDICAL & SURGICAL HISTORY:  CLL (chronic lymphocytic leukemia)      History of BPH      H/O excision of mass  R hip          FAMILY HISTORY:      Alochol: Denied  Smoking: Nonsmoker  Drug Use: Denied  Marital Status:         Allergies    Bactrim (Unknown)    Intolerances        MEDICATIONS  (STANDING):  amoxicillin  875 milliGRAM(s)/clavulanate 1 Tablet(s) Oral every 12 hours  ascorbic acid 500 milliGRAM(s) Oral daily  enoxaparin Injectable 40 milliGRAM(s) SubCutaneous every 24 hours  finasteride 5 milliGRAM(s) Oral daily  furosemide    Tablet 20 milliGRAM(s) Oral daily  methylPREDNISolone sodium succinate Injectable 60 milliGRAM(s) IV Push daily  metoprolol succinate ER 25 milliGRAM(s) Oral daily  multivitamin 1 Tablet(s) Oral daily  pantoprazole    Tablet 40 milliGRAM(s) Oral before breakfast  PARoxetine 10 milliGRAM(s) Oral daily  sodium chloride 0.9% lock flush 3 milliLiter(s) IV Push every 8 hours  tamsulosin 0.4 milliGRAM(s) Oral at bedtime  venetoclax 400 milliGRAM(s) Oral <User Schedule>    MEDICATIONS  (PRN):      ROS  No fever, sweats, chills  HA improved  No CP, SOB, cough, sputum  No n/v/d, abd pain, melena, hematochezia  No edema  No rash  No anxiety  No back pain, joint pain  No bleeding, bruising  No dysuria, hematuria    T(C): 36.4 (06-30-25 @ 05:23), Max: 36.5 (06-29-25 @ 20:38)  HR: 54 (06-30-25 @ 05:23) (54 - 68)  BP: 106/60 (06-30-25 @ 05:23) (106/60 - 111/62)  RR: 18 (06-30-25 @ 05:23) (18 - 18)  SpO2: 96% (06-30-25 @ 05:23) (96% - 96%)  Wt(kg): --    PE  NAD  Awake, alert  Anicteric, MMM  RRR  CTAB  Abd soft, NT, ND  No c/c/e  No rash grossly                        11.8   5.08  )-----------( 124      ( 30 Jun 2025 06:53 )             35.2       06-30    141  |  107  |  28[H]  ----------------------------<  165[H]  3.6   |  20[L]  |  0.90    Ca    8.7      30 Jun 2025 06:51

## 2025-07-01 LAB
ANION GAP SERPL CALC-SCNC: 14 MMOL/L — SIGNIFICANT CHANGE UP (ref 5–17)
BUN SERPL-MCNC: 30 MG/DL — HIGH (ref 7–23)
CALCIUM SERPL-MCNC: 8.7 MG/DL — SIGNIFICANT CHANGE UP (ref 8.4–10.5)
CHLORIDE SERPL-SCNC: 104 MMOL/L — SIGNIFICANT CHANGE UP (ref 96–108)
CO2 SERPL-SCNC: 24 MMOL/L — SIGNIFICANT CHANGE UP (ref 22–31)
CREAT SERPL-MCNC: 0.94 MG/DL — SIGNIFICANT CHANGE UP (ref 0.5–1.3)
EGFR: 85 ML/MIN/1.73M2 — SIGNIFICANT CHANGE UP
EGFR: 85 ML/MIN/1.73M2 — SIGNIFICANT CHANGE UP
GLUCOSE SERPL-MCNC: 107 MG/DL — HIGH (ref 70–99)
HCT VFR BLD CALC: 38.4 % — LOW (ref 39–50)
HGB BLD-MCNC: 12.5 G/DL — LOW (ref 13–17)
MAGNESIUM SERPL-MCNC: 2.2 MG/DL — SIGNIFICANT CHANGE UP (ref 1.6–2.6)
MCHC RBC-ENTMCNC: 30 PG — SIGNIFICANT CHANGE UP (ref 27–34)
MCHC RBC-ENTMCNC: 32.6 G/DL — SIGNIFICANT CHANGE UP (ref 32–36)
MCV RBC AUTO: 92.1 FL — SIGNIFICANT CHANGE UP (ref 80–100)
NRBC # BLD AUTO: 0 K/UL — SIGNIFICANT CHANGE UP (ref 0–0)
NRBC # FLD: 0 K/UL — SIGNIFICANT CHANGE UP (ref 0–0)
NRBC BLD AUTO-RTO: 0 /100 WBCS — SIGNIFICANT CHANGE UP (ref 0–0)
PHOSPHATE SERPL-MCNC: 1.4 MG/DL — LOW (ref 2.5–4.5)
PLATELET # BLD AUTO: 121 K/UL — LOW (ref 150–400)
PMV BLD: 9.1 FL — SIGNIFICANT CHANGE UP (ref 7–13)
POTASSIUM SERPL-MCNC: 3.8 MMOL/L — SIGNIFICANT CHANGE UP (ref 3.5–5.3)
POTASSIUM SERPL-SCNC: 3.8 MMOL/L — SIGNIFICANT CHANGE UP (ref 3.5–5.3)
RBC # BLD: 4.17 M/UL — LOW (ref 4.2–5.8)
RBC # FLD: 13.2 % — SIGNIFICANT CHANGE UP (ref 10.3–14.5)
SODIUM SERPL-SCNC: 142 MMOL/L — SIGNIFICANT CHANGE UP (ref 135–145)
WBC # BLD: 4.87 K/UL — SIGNIFICANT CHANGE UP (ref 3.8–10.5)
WBC # FLD AUTO: 4.87 K/UL — SIGNIFICANT CHANGE UP (ref 3.8–10.5)

## 2025-07-01 PROCEDURE — 85610 PROTHROMBIN TIME: CPT

## 2025-07-01 PROCEDURE — 70551 MRI BRAIN STEM W/O DYE: CPT

## 2025-07-01 PROCEDURE — 99233 SBSQ HOSP IP/OBS HIGH 50: CPT

## 2025-07-01 PROCEDURE — 84100 ASSAY OF PHOSPHORUS: CPT

## 2025-07-01 PROCEDURE — 86480 TB TEST CELL IMMUN MEASURE: CPT

## 2025-07-01 PROCEDURE — 0241U: CPT

## 2025-07-01 PROCEDURE — 70544 MR ANGIOGRAPHY HEAD W/O DYE: CPT

## 2025-07-01 PROCEDURE — 86704 HEP B CORE ANTIBODY TOTAL: CPT

## 2025-07-01 PROCEDURE — 81003 URINALYSIS AUTO W/O SCOPE: CPT

## 2025-07-01 PROCEDURE — 99232 SBSQ HOSP IP/OBS MODERATE 35: CPT

## 2025-07-01 PROCEDURE — 85730 THROMBOPLASTIN TIME PARTIAL: CPT

## 2025-07-01 PROCEDURE — 80074 ACUTE HEPATITIS PANEL: CPT

## 2025-07-01 PROCEDURE — 80048 BASIC METABOLIC PNL TOTAL CA: CPT

## 2025-07-01 PROCEDURE — 70540 MRI ORBIT/FACE/NECK W/O DYE: CPT

## 2025-07-01 PROCEDURE — 36415 COLL VENOUS BLD VENIPUNCTURE: CPT

## 2025-07-01 PROCEDURE — 93998 UNLISTD NONINVAS VASC DX STD: CPT

## 2025-07-01 PROCEDURE — 97116 GAIT TRAINING THERAPY: CPT

## 2025-07-01 PROCEDURE — 99232 SBSQ HOSP IP/OBS MODERATE 35: CPT | Mod: GC

## 2025-07-01 PROCEDURE — 83735 ASSAY OF MAGNESIUM: CPT

## 2025-07-01 PROCEDURE — 86140 C-REACTIVE PROTEIN: CPT

## 2025-07-01 PROCEDURE — 97530 THERAPEUTIC ACTIVITIES: CPT

## 2025-07-01 PROCEDURE — 93005 ELECTROCARDIOGRAM TRACING: CPT

## 2025-07-01 PROCEDURE — 80053 COMPREHEN METABOLIC PANEL: CPT

## 2025-07-01 PROCEDURE — 70547 MR ANGIOGRAPHY NECK W/O DYE: CPT

## 2025-07-01 PROCEDURE — 85027 COMPLETE CBC AUTOMATED: CPT

## 2025-07-01 PROCEDURE — 85025 COMPLETE CBC W/AUTO DIFF WBC: CPT

## 2025-07-01 PROCEDURE — 97162 PT EVAL MOD COMPLEX 30 MIN: CPT

## 2025-07-01 PROCEDURE — 85652 RBC SED RATE AUTOMATED: CPT

## 2025-07-01 RX ORDER — SODIUM CHLORIDE 9 G/1000ML
1000 INJECTION, SOLUTION INTRAVENOUS
Refills: 0 | Status: DISCONTINUED | OUTPATIENT
Start: 2025-07-02 | End: 2025-07-03

## 2025-07-01 RX ORDER — VENETOCLAX 10 MG/1
400 TABLET, FILM COATED ORAL ONCE
Refills: 0 | Status: COMPLETED | OUTPATIENT
Start: 2025-07-01 | End: 2025-07-01

## 2025-07-01 RX ADMIN — METOPROLOL SUCCINATE 25 MILLIGRAM(S): 50 TABLET, EXTENDED RELEASE ORAL at 06:19

## 2025-07-01 RX ADMIN — TAMSULOSIN HYDROCHLORIDE 0.4 MILLIGRAM(S): 0.4 CAPSULE ORAL at 22:09

## 2025-07-01 RX ADMIN — FINASTERIDE 5 MILLIGRAM(S): 1 TABLET, FILM COATED ORAL at 22:09

## 2025-07-01 RX ADMIN — AMOXICILLIN AND CLAVULANATE POTASSIUM 1 TABLET(S): 500; 125 TABLET, FILM COATED ORAL at 06:19

## 2025-07-01 RX ADMIN — AMOXICILLIN AND CLAVULANATE POTASSIUM 1 TABLET(S): 500; 125 TABLET, FILM COATED ORAL at 17:03

## 2025-07-01 RX ADMIN — ENOXAPARIN SODIUM 40 MILLIGRAM(S): 100 INJECTION SUBCUTANEOUS at 06:19

## 2025-07-01 RX ADMIN — FUROSEMIDE 20 MILLIGRAM(S): 10 INJECTION INTRAMUSCULAR; INTRAVENOUS at 06:19

## 2025-07-01 RX ADMIN — Medication 40 MILLIGRAM(S): at 06:19

## 2025-07-01 RX ADMIN — Medication 3 MILLILITER(S): at 22:08

## 2025-07-01 RX ADMIN — METHYLPREDNISOLONE ACETATE 60 MILLIGRAM(S): 80 INJECTION, SUSPENSION INTRA-ARTICULAR; INTRALESIONAL; INTRAMUSCULAR; SOFT TISSUE at 06:20

## 2025-07-01 RX ADMIN — Medication 1 TABLET(S): at 11:17

## 2025-07-01 RX ADMIN — VENETOCLAX 400 MILLIGRAM(S): 10 TABLET, FILM COATED ORAL at 17:01

## 2025-07-01 RX ADMIN — Medication 500 MILLIGRAM(S): at 11:17

## 2025-07-01 RX ADMIN — Medication 3 MILLILITER(S): at 16:29

## 2025-07-01 RX ADMIN — Medication 3 MILLILITER(S): at 06:22

## 2025-07-01 RX ADMIN — PAROXETINE HYDROCHLORIDE 10 MILLIGRAM(S): 20 TABLET, FILM COATED ORAL at 22:09

## 2025-07-01 NOTE — PROGRESS NOTE ADULT - ASSESSMENT
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 He was seen by ophthalmology who performed full exam and referred patient to ED for further work up.     CLL  - Completed C6 of Obina on 4/7/25. Remains on Venetoclax 400mg daily. Follows with Dr. Osito Liu at [facility] [place].   - Please continue Venetoclax. Please hold Venetoclax on the day of planned biopsy along with the following day. May be resumed 2 days following biopsy.    Giant cell arteritis  - MRI brain/orbits and MRA head: No evidence of acute infarct, intracranial blood products, mass effect or midline shift. Possible purulent bacterial sinusitis.   - Duplex of temporal artery: Thickened appearance of bilateral temporal arteries, right greater than left, with mild surrounding soft tissue edema, indeterminate for arteritis.   - Rheum, opthalmology and vascular following. Continues IV steroids, pending biopsy.    Will continue to follow.    Santiago Broussard PA-C  Hematology/Oncology  [facility]  [phone] (office)  Evenings and weekends please call MD on call or office

## 2025-07-01 NOTE — PROGRESS NOTE ADULT - SUBJECTIVE AND OBJECTIVE BOX
[facility] DEPARTMENT OF OPHTHALMOLOGY  ------------------------------------------------------------------------------    Interval History: No acute events overnight. Pt stating vision is improving.     MEDICATIONS  (STANDING):  amoxicillin  875 milliGRAM(s)/clavulanate 1 Tablet(s) Oral every 12 hours  ascorbic acid 500 milliGRAM(s) Oral daily  enoxaparin Injectable 40 milliGRAM(s) SubCutaneous every 24 hours  finasteride 5 milliGRAM(s) Oral daily  furosemide    Tablet 20 milliGRAM(s) Oral daily  methylPREDNISolone sodium succinate Injectable 60 milliGRAM(s) IV Push daily  metoprolol succinate ER 25 milliGRAM(s) Oral daily  multivitamin 1 Tablet(s) Oral daily  pantoprazole    Tablet 40 milliGRAM(s) Oral before breakfast  PARoxetine 10 milliGRAM(s) Oral daily  sodium chloride 0.9% lock flush 3 milliLiter(s) IV Push every 8 hours  tamsulosin 0.4 milliGRAM(s) Oral at bedtime  venetoclax 400 milliGRAM(s) Oral <User Schedule>    MEDICATIONS  (PRN):      VITALS: T(C): 36.7 (07-01-25 @ 05:47)  T(F): 98.1 (07-01-25 @ 05:47), Max: 98.2 (06-30-25 @ 11:55)  HR: 66 (07-01-25 @ 10:13) (58 - 66)  BP: 120/67 (07-01-25 @ 10:13) (96/61 - 123/72)  RR:  (18 - 18)  SpO2:  (96% - 98%)  Wt(kg): --  General: AAO x 3, appropriate mood and affect    Ophthalmology Exam:  Visual acuity (cc): 20/30 OU  Pupils: PERRL OU, no APD  Ttono: 15 OD, 14 OS  Extraocular movements (EOMs): Full OU, no pain, no diplopia  Confrontational Visual Field (CVF): Full OD, superotemporal deficit OS   Amsler grid full OD, superotemporal blurriness on the grid OS   Color Plates: 12/12 OU    Pen Light Exam (PLE)  External: Flat OU  Lids/Lashes/Lacrimal Ducts: Flat OU    Sclera/Conjunctiva: W+Q OU  Cornea: DBUT OU  Anterior Chamber: D+F OU    Iris: Flat OU  Lens: cataract OU    Fundus Exam: dilated with 1% tropicamide and 2.5% phenylephrine  Approval obtained from primary team for dilation  Patient aware that pupils can remained dilated for at least 4-6 hours  Exam performed with 20D lens    Vitreous: wnl OU  Disc, cup/disc: 0.35 OU. Mild temporal pallor OS. No disc edema OU.  Macula: wnl OU  Vessels: wnl OU  Periphery: wnl OU

## 2025-07-01 NOTE — PROGRESS NOTE ADULT - SUBJECTIVE AND OBJECTIVE BOX
Patient is a 74y old  Male who presents with a chief complaint of Headache (30 Jun 2025 10:55)    Patient seen and examined at bedside, feeling well.     MEDICATIONS  (STANDING):  amoxicillin  875 milliGRAM(s)/clavulanate 1 Tablet(s) Oral every 12 hours  ascorbic acid 500 milliGRAM(s) Oral daily  enoxaparin Injectable 40 milliGRAM(s) SubCutaneous every 24 hours  finasteride 5 milliGRAM(s) Oral daily  furosemide    Tablet 20 milliGRAM(s) Oral daily  methylPREDNISolone sodium succinate Injectable 60 milliGRAM(s) IV Push daily  metoprolol succinate ER 25 milliGRAM(s) Oral daily  multivitamin 1 Tablet(s) Oral daily  pantoprazole    Tablet 40 milliGRAM(s) Oral before breakfast  PARoxetine 10 milliGRAM(s) Oral daily  sodium chloride 0.9% lock flush 3 milliLiter(s) IV Push every 8 hours  tamsulosin 0.4 milliGRAM(s) Oral at bedtime  venetoclax 400 milliGRAM(s) Oral <User Schedule>    MEDICATIONS  (PRN):    Vital Signs Last 24 Hrs  T(C): 36.7 (01 Jul 2025 05:47), Max: 36.8 (30 Jun 2025 11:55)  T(F): 98.1 (01 Jul 2025 05:47), Max: 98.2 (30 Jun 2025 11:55)  HR: 60 (01 Jul 2025 05:47) (58 - 62)  BP: 116/63 (01 Jul 2025 05:47) (96/61 - 123/72)  BP(mean): 81 (01 Jul 2025 05:47) (81 - 81)  RR: 18 (01 Jul 2025 05:47) (18 - 18)  SpO2: 96% (01 Jul 2025 05:47) (96% - 98%)    Parameters below as of 01 Jul 2025 05:47  Patient On (Oxygen Delivery Method): room air    PE  NAD  Awake, alert  Anicteric, MMM  RRR  CTAB  Abd soft, NT, ND  No c/c/e  No rash grossly                          12.5   4.87  )-----------( 121      ( 01 Jul 2025 06:34 )             38.4       07-01    142  |  104  |  30[H]  ----------------------------<  107[H]  3.8   |  24  |  0.94    Ca    8.7      01 Jul 2025 06:37  Phos  1.4     07-01  Mg     2.2     07-01

## 2025-07-01 NOTE — PROGRESS NOTE ADULT - ASSESSMENT
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  Patient with reported history of left-sided headache and temporal tenderness over the 2 days.  He was seen by ophthalmology who performed full exam and referred patient to ED for further work up. Pt notes some blurry vision at appointment. He denies current HA, dizziness, chest pain, sob, abd pain, n/v/d, numbness or weakness seen in the ED by opthalmology and rheumatology. elevated CRP, needs IV steroids and MRI per Rheum so will be admitted for further management   6/28 VSS; RSR ; IV steroids as per rheum; ESR 32/ now 27--> continue to trend; vasc consulted for possible TAB; MRI brain/orbits and MRA head and neck non con pending for today  6/29 VSS hemeonc & medicine called for clearance.  Augmentin 875 bid for sinusitis  6/30  Temporal artery biopsy on hold until hemeonc input regarding chemo- place on hold for procedure or continue?  patient does not want to hold his chemo medication.    discharge planning- home when stable   7/1   cleared by [facility] to take Cancer medications while undergoing Temporal BX,   VSS

## 2025-07-01 NOTE — PROGRESS NOTE ADULT - PROBLEM SELECTOR PLAN 1
rheum and opth following  IV steroids as per rheum  vasc   for Temporal Bx  in am    discharge planning- home when stable

## 2025-07-01 NOTE — PROGRESS NOTE ADULT - SUBJECTIVE AND OBJECTIVE BOX
INTERVAL EVENTS: No acute events overnight.  SUBJECTIVE: Patient seen and examined at bedside with surgical team, patient without complaints. Denies fever, chills, CP, SOB nausea, vomiting, abdominal pain.    OBJECTIVE:    Vital Signs Last 24 Hrs  T(C): 36.7 (01 Jul 2025 05:47), Max: 36.8 (30 Jun 2025 11:55)  T(F): 98.1 (01 Jul 2025 05:47), Max: 98.2 (30 Jun 2025 11:55)  HR: 66 (01 Jul 2025 10:13) (58 - 66)  BP: 120/67 (01 Jul 2025 10:13) (96/61 - 123/72)  BP(mean): 81 (01 Jul 2025 05:47) (81 - 81)  RR: 18 (01 Jul 2025 05:47) (18 - 18)  SpO2: 97% (01 Jul 2025 10:13) (96% - 98%)    Parameters below as of 01 Jul 2025 10:13  Patient On (Oxygen Delivery Method): room air    I&O's Detail    30 Jun 2025 07:01  -  01 Jul 2025 07:00  --------------------------------------------------------  IN:    Oral Fluid: 440 mL  Total IN: 440 mL    OUT:    Voided (mL): 1550 mL  Total OUT: 1550 mL    Total NET: -1110 mL      01 Jul 2025 07:01  -  01 Jul 2025 11:20  --------------------------------------------------------  IN:  Total IN: 0 mL    OUT:    Voided (mL): 600 mL  Total OUT: 600 mL    Total NET: -600 mL      MEDICATIONS  (STANDING):  amoxicillin  875 milliGRAM(s)/clavulanate 1 Tablet(s) Oral every 12 hours  ascorbic acid 500 milliGRAM(s) Oral daily  enoxaparin Injectable 40 milliGRAM(s) SubCutaneous every 24 hours  finasteride 5 milliGRAM(s) Oral daily  furosemide    Tablet 20 milliGRAM(s) Oral daily  methylPREDNISolone sodium succinate Injectable 60 milliGRAM(s) IV Push daily  metoprolol succinate ER 25 milliGRAM(s) Oral daily  multivitamin 1 Tablet(s) Oral daily  pantoprazole    Tablet 40 milliGRAM(s) Oral before breakfast  PARoxetine 10 milliGRAM(s) Oral daily  sodium chloride 0.9% lock flush 3 milliLiter(s) IV Push every 8 hours  tamsulosin 0.4 milliGRAM(s) Oral at bedtime  venetoclax 400 milliGRAM(s) Oral <User Schedule>    MEDICATIONS  (PRN):      PHYSICAL EXAM:  General Appearance: Appears well, NAD   Chest: Nonlabored breathing   CV: Hemodynamically stable  Extremities: Grossly symmetric, WWP     LABS:                        12.5   4.87  )-----------( 121      ( 01 Jul 2025 06:34 )             38.4     07-01    142  |  104  |  30[H]  ----------------------------<  107[H]  3.8   |  24  |  0.94    Ca    8.7      01 Jul 2025 06:37  Phos  1.4     07-01  Mg     2.2     07-01          Urinalysis Basic - ( 01 Jul 2025 06:37 )    Color: x / Appearance: x / SG: x / pH: x  Gluc: 107 mg/dL / Ketone: x  / Bili: x / Urobili: x   Blood: x / Protein: x / Nitrite: x   Leuk Esterase: x / RBC: x / WBC x   Sq Epi: x / Non Sq Epi: x / Bacteria: x

## 2025-07-01 NOTE — PRE PROCEDURE NOTE - PRE PROCEDURE EVALUATION
SURGERY PRE-OP NOTE    Planned Procedure: Left temporal artery biopsy  Surgeon: Dr. Hernandez      Pertinent Labs:                            12.5   4.87  )-----------( 121      ( 01 Jul 2025 06:34 )             38.4       07-01    142  |  104  |  30[H]  ----------------------------<  107[H]  3.8   |  24  |  0.94    Ca    8.7      01 Jul 2025 06:37  Phos  1.4     07-01  Mg     2.2     07-01        -----------------------------------------------------------------------------------------------------------------------------------  Type and Screen  pending    -----------------------------------------------------------------------------------------------------------------------------------  U/A:  UCx:  Urinalysis Basic - ( 01 Jul 2025 06:37 )    Color: x / Appearance: x / SG: x / pH: x  Gluc: 107 mg/dL / Ketone: x  / Bili: x / Urobili: x   Blood: x / Protein: x / Nitrite: x   Leuk Esterase: x / RBC: x / WBC x   Sq Epi: x / Non Sq Epi: x / Bacteria: x    -----------------------------------------------------------------------------------------------------------------------------------  EKG:  < from: 12 Lead ECG (06.29.25 @ 22:11) >    Ventricular Rate 60 BPM    Atrial Rate 60 BPM    P-R Interval 156 ms    QRS Duration 118 ms    Q-T Interval 430 ms    QTC Calculation(Bazett) 430 ms    P Axis 16 degrees    R Axis -22 degrees    T Axis -4 degrees    Diagnosis Line NORMAL SINUS RHYTHM  LEFT VENTRICULAR HYPERTROPHY WITH QRS WIDENING ( R in aVL , Dale product )  CANNOT RULE OUT SEPTAL INFARCT (CITED ON OR BEFORE 06-OCT-2021)  ABNORMAL ECG  WHEN COMPARED WITH ECG OF 27-JUN-2025 18:28,  NO SIGNIFICANT CHANGE WAS FOUND    < end of copied text >    -----------------------------------------------------------------------------------------------------------------------------------  Echo  < from: Transthoracic Echocardiogram (03.24.21 @ 12:52) >  Observations:  Mitral Valve: Normal mitral valve. Minimal mitral  regurgitation.  Aortic Valve/Aorta: Calcified aortic valve with normal  opening.  Normal aortic root.  Left Atrium: Normal left atrium.  Left Ventricle: Normal left ventricular internal dimensions  and wall thicknesses.  Normal left ventricular systolic function. Probably no  segmental wall motion abnormalities.  Normal diastolic function.  Right Heart: Normal right atrium. Normal right ventricular  size and function.  Normal tricuspid valve. Normal pulmonic valve.  Pericardium/Pleura: Normal pericardium with no pericardial  effusion.  Hemodynamic: Estimated right atrial pressure is normal.  No evidence of pulmonary hypertension.  No PFO seen with color Doppler.    < end of copied text >      -----------------------------------------------------------------------------------------------------------------------------------      Plan:  - NPO after midnight   - consent signed in chart  - Preop for Left temporal artery biopsy with Dr. Jenna Overton  - Pending: AM labs  - pre op labs 4:00 am of 7/2: cbc, bmp, mg, phos, PTT/INR, type and screenx1      Vascular Surgery  [id number]

## 2025-07-01 NOTE — PROGRESS NOTE ADULT - ASSESSMENT
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     Recommendations:  - OR tomorrow for Left TAB 7/2 - NPO at MN, 4 am labs   - Consent to be obtained  - Medical optimization appreciated  - Appreciate rheumatology recommendations  - heme/onc recommendations - no need to hold Venetoclax prior to the procedure.   - Remainder of care per primary    Vascular Surgery  [id number]

## 2025-07-01 NOTE — PROGRESS NOTE ADULT - SUBJECTIVE AND OBJECTIVE BOX
VITAL SIGNS    Telemetry:      Vital Signs Last 24 Hrs  T(C): 36.7 (25 @ 05:47), Max: 36.8 (25 @ 11:55)  T(F): 98.1 (25 @ 05:47), Max: 98.2 (25 @ 11:55)  HR: 60 (25 @ 05:47) (58 - 62)  BP: 116/63 (25 @ 05:47) (96/61 - 123/72)  RR: 18 (25 @ 05:47) (18 - 18)  SpO2: 96% (25 @ 05:47) (96% - 98%)                   Daily     Daily Weight in k.3 (2025 08:00)        CAPILLARY BLOOD GLUCOSE                                PHYSICAL EXAM  s   No SOB  No CP"  Neurology: alert and oriented x 3, moves all extremities with no defecits  CV :  RRR  Lungs:   CTA B/L  Abdomen: soft, nontender, nondistended, positive bowel sounds

## 2025-07-01 NOTE — PROGRESS NOTE ADULT - ASSESSMENT
74y male with a past medical history/ocular history of active CLL and prior herpeskeratitis consulted for r/o GCA     #R/o Giant Cell Arthritis   - Pt with initial BCVA 20/40 OD, 20/40 OS, + Trace APD OS, and IOP 17,18  - PT presented to outside ophthalmologist with +APD OS, 3/11 color plates, and decreased vision and headache, now with full color plates, no headache and improved vision. (Still +trace APD OS)  - GCA ROS negative, no scalp tenderness, no pain with chewing, temporal pulses full bilaterally  - Color Plates Full, CVF full, Optic nerve without edema, no hemorrhages   - 7/1 BCVA 20/30 OU, no APD, IOP 15, 14 EOM full OU, color 12/12 OU, CVF full OD, superotemporal deficit OS. DFE with temporal optic nerve pallor OS, no optic nerve edema, no hemorrhages   - ESR 32 (6/27) -> 27 (6/28), CRP 36 (6/27) -> 29 (6/28) --> ESR 12 (6/29)  - S/p solumedrol 1g 6/26-6/29   - Tapered to solumedrol 60mg on 6/29  - Appreciate Rheumatology recs  - Low clinical suspicion for GCA at this time  - Further management including decision for temporal artery biopsy per primary/rheumatology    SDW Dr. Aggarwal (neuro-ophthalmology attending)    Outpatient Follow-up: Patient should follow-up with his/her ophthalmologist or with [facility] Department of Ophthalmology within 1 week of after discharge at:    [address]  [place] [address]  [phone]    Contact: [employer] Teams

## 2025-07-02 ENCOUNTER — APPOINTMENT (OUTPATIENT)
Dept: VASCULAR SURGERY | Facility: HOSPITAL | Age: 75
End: 2025-07-02

## 2025-07-02 ENCOUNTER — RESULT REVIEW (OUTPATIENT)
Age: 75
End: 2025-07-02

## 2025-07-02 ENCOUNTER — NON-APPOINTMENT (OUTPATIENT)
Age: 75
End: 2025-07-02

## 2025-07-02 LAB
ANION GAP SERPL CALC-SCNC: 12 MMOL/L — SIGNIFICANT CHANGE UP (ref 5–17)
APTT BLD: 25.9 SEC — LOW (ref 26.1–36.8)
BLD GP AB SCN SERPL QL: NEGATIVE — SIGNIFICANT CHANGE UP
BUN SERPL-MCNC: 33 MG/DL — HIGH (ref 7–23)
CALCIUM SERPL-MCNC: 8.7 MG/DL — SIGNIFICANT CHANGE UP (ref 8.4–10.5)
CHLORIDE SERPL-SCNC: 102 MMOL/L — SIGNIFICANT CHANGE UP (ref 96–108)
CO2 SERPL-SCNC: 26 MMOL/L — SIGNIFICANT CHANGE UP (ref 22–31)
CREAT SERPL-MCNC: 0.97 MG/DL — SIGNIFICANT CHANGE UP (ref 0.5–1.3)
EGFR: 81 ML/MIN/1.73M2 — SIGNIFICANT CHANGE UP
EGFR: 81 ML/MIN/1.73M2 — SIGNIFICANT CHANGE UP
GAMMA INTERFERON BACKGROUND BLD IA-ACNC: 0.03 IU/ML — SIGNIFICANT CHANGE UP
GLUCOSE SERPL-MCNC: 112 MG/DL — HIGH (ref 70–99)
HCT VFR BLD CALC: 40.3 % — SIGNIFICANT CHANGE UP (ref 39–50)
HGB BLD-MCNC: 13.4 G/DL — SIGNIFICANT CHANGE UP (ref 13–17)
INR BLD: 1.01 RATIO — SIGNIFICANT CHANGE UP (ref 0.85–1.16)
M TB IFN-G BLD-IMP: NEGATIVE — SIGNIFICANT CHANGE UP
M TB IFN-G CD4+ BCKGRND COR BLD-ACNC: 0 IU/ML — SIGNIFICANT CHANGE UP
M TB IFN-G CD4+CD8+ BCKGRND COR BLD-ACNC: 0.01 IU/ML — SIGNIFICANT CHANGE UP
MAGNESIUM SERPL-MCNC: 2.4 MG/DL — SIGNIFICANT CHANGE UP (ref 1.6–2.6)
MCHC RBC-ENTMCNC: 30.5 PG — SIGNIFICANT CHANGE UP (ref 27–34)
MCHC RBC-ENTMCNC: 33.3 G/DL — SIGNIFICANT CHANGE UP (ref 32–36)
MCV RBC AUTO: 91.6 FL — SIGNIFICANT CHANGE UP (ref 80–100)
NRBC # BLD AUTO: 0 K/UL — SIGNIFICANT CHANGE UP (ref 0–0)
NRBC # FLD: 0 K/UL — SIGNIFICANT CHANGE UP (ref 0–0)
NRBC BLD AUTO-RTO: 0 /100 WBCS — SIGNIFICANT CHANGE UP (ref 0–0)
PHOSPHATE SERPL-MCNC: 1.8 MG/DL — LOW (ref 2.5–4.5)
PLATELET # BLD AUTO: 120 K/UL — LOW (ref 150–400)
PMV BLD: 8.8 FL — SIGNIFICANT CHANGE UP (ref 7–13)
POTASSIUM SERPL-MCNC: 3.9 MMOL/L — SIGNIFICANT CHANGE UP (ref 3.5–5.3)
POTASSIUM SERPL-SCNC: 3.9 MMOL/L — SIGNIFICANT CHANGE UP (ref 3.5–5.3)
PROTHROM AB SERPL-ACNC: 11.6 SEC — SIGNIFICANT CHANGE UP (ref 9.9–13.4)
QUANT TB PLUS MITOGEN MINUS NIL: >10 IU/ML — SIGNIFICANT CHANGE UP
RBC # BLD: 4.4 M/UL — SIGNIFICANT CHANGE UP (ref 4.2–5.8)
RBC # FLD: 13.1 % — SIGNIFICANT CHANGE UP (ref 10.3–14.5)
RH IG SCN BLD-IMP: POSITIVE — SIGNIFICANT CHANGE UP
SODIUM SERPL-SCNC: 140 MMOL/L — SIGNIFICANT CHANGE UP (ref 135–145)
WBC # BLD: 4.69 K/UL — SIGNIFICANT CHANGE UP (ref 3.8–10.5)
WBC # FLD AUTO: 4.69 K/UL — SIGNIFICANT CHANGE UP (ref 3.8–10.5)

## 2025-07-02 PROCEDURE — 70544 MR ANGIOGRAPHY HEAD W/O DYE: CPT

## 2025-07-02 PROCEDURE — 93998 UNLISTD NONINVAS VASC DX STD: CPT

## 2025-07-02 PROCEDURE — 86900 BLOOD TYPING SEROLOGIC ABO: CPT

## 2025-07-02 PROCEDURE — 93005 ELECTROCARDIOGRAM TRACING: CPT

## 2025-07-02 PROCEDURE — 88313 SPECIAL STAINS GROUP 2: CPT | Mod: 26

## 2025-07-02 PROCEDURE — 70551 MRI BRAIN STEM W/O DYE: CPT

## 2025-07-02 PROCEDURE — 84100 ASSAY OF PHOSPHORUS: CPT

## 2025-07-02 PROCEDURE — 85027 COMPLETE CBC AUTOMATED: CPT

## 2025-07-02 PROCEDURE — 85652 RBC SED RATE AUTOMATED: CPT

## 2025-07-02 PROCEDURE — 97162 PT EVAL MOD COMPLEX 30 MIN: CPT

## 2025-07-02 PROCEDURE — 86704 HEP B CORE ANTIBODY TOTAL: CPT

## 2025-07-02 PROCEDURE — 85730 THROMBOPLASTIN TIME PARTIAL: CPT

## 2025-07-02 PROCEDURE — 81003 URINALYSIS AUTO W/O SCOPE: CPT

## 2025-07-02 PROCEDURE — 88305 TISSUE EXAM BY PATHOLOGIST: CPT | Mod: 26

## 2025-07-02 PROCEDURE — 70540 MRI ORBIT/FACE/NECK W/O DYE: CPT

## 2025-07-02 PROCEDURE — 97116 GAIT TRAINING THERAPY: CPT

## 2025-07-02 PROCEDURE — 85610 PROTHROMBIN TIME: CPT

## 2025-07-02 PROCEDURE — 80048 BASIC METABOLIC PNL TOTAL CA: CPT

## 2025-07-02 PROCEDURE — 86480 TB TEST CELL IMMUN MEASURE: CPT

## 2025-07-02 PROCEDURE — 97530 THERAPEUTIC ACTIVITIES: CPT

## 2025-07-02 PROCEDURE — 0241U: CPT

## 2025-07-02 PROCEDURE — 86850 RBC ANTIBODY SCREEN: CPT

## 2025-07-02 PROCEDURE — 36415 COLL VENOUS BLD VENIPUNCTURE: CPT

## 2025-07-02 PROCEDURE — 83735 ASSAY OF MAGNESIUM: CPT

## 2025-07-02 PROCEDURE — 85025 COMPLETE CBC W/AUTO DIFF WBC: CPT

## 2025-07-02 PROCEDURE — 80053 COMPREHEN METABOLIC PANEL: CPT

## 2025-07-02 PROCEDURE — 37609 LIGATION/BX TEMPORAL ARTERY: CPT | Mod: GC,LT

## 2025-07-02 PROCEDURE — 70547 MR ANGIOGRAPHY NECK W/O DYE: CPT

## 2025-07-02 PROCEDURE — 80074 ACUTE HEPATITIS PANEL: CPT

## 2025-07-02 PROCEDURE — 86901 BLOOD TYPING SEROLOGIC RH(D): CPT

## 2025-07-02 PROCEDURE — 86140 C-REACTIVE PROTEIN: CPT

## 2025-07-02 DEVICE — CLIP APPLIER COVIDIEN SURGICLIP III 9" SM: Type: IMPLANTABLE DEVICE | Site: LEFT | Status: FUNCTIONAL

## 2025-07-02 RX ORDER — AMOXICILLIN AND CLAVULANATE POTASSIUM 500; 125 MG/1; MG/1
1 TABLET, FILM COATED ORAL EVERY 12 HOURS
Refills: 0 | Status: DISCONTINUED | OUTPATIENT
Start: 2025-07-02 | End: 2025-07-03

## 2025-07-02 RX ORDER — SODIUM CHLORIDE 9 G/1000ML
1000 INJECTION, SOLUTION INTRAVENOUS
Refills: 0 | Status: DISCONTINUED | OUTPATIENT
Start: 2025-07-02 | End: 2025-07-02

## 2025-07-02 RX ORDER — METOPROLOL SUCCINATE 50 MG/1
25 TABLET, EXTENDED RELEASE ORAL DAILY
Refills: 0 | Status: DISCONTINUED | OUTPATIENT
Start: 2025-07-02 | End: 2025-07-03

## 2025-07-02 RX ORDER — TAMSULOSIN HYDROCHLORIDE 0.4 MG/1
0.4 CAPSULE ORAL AT BEDTIME
Refills: 0 | Status: DISCONTINUED | OUTPATIENT
Start: 2025-07-02 | End: 2025-07-03

## 2025-07-02 RX ORDER — PAROXETINE HYDROCHLORIDE 20 MG/1
10 TABLET, FILM COATED ORAL DAILY
Refills: 0 | Status: DISCONTINUED | OUTPATIENT
Start: 2025-07-02 | End: 2025-07-03

## 2025-07-02 RX ORDER — METHYLPREDNISOLONE ACETATE 80 MG/ML
60 INJECTION, SUSPENSION INTRA-ARTICULAR; INTRALESIONAL; INTRAMUSCULAR; SOFT TISSUE DAILY
Refills: 0 | Status: DISCONTINUED | OUTPATIENT
Start: 2025-07-02 | End: 2025-07-03

## 2025-07-02 RX ORDER — B1/B2/B3/B5/B6/B12/VIT C/FOLIC 500-0.5 MG
1 TABLET ORAL DAILY
Refills: 0 | Status: DISCONTINUED | OUTPATIENT
Start: 2025-07-02 | End: 2025-07-03

## 2025-07-02 RX ORDER — FINASTERIDE 1 MG/1
5 TABLET, FILM COATED ORAL DAILY
Refills: 0 | Status: DISCONTINUED | OUTPATIENT
Start: 2025-07-02 | End: 2025-07-03

## 2025-07-02 RX ORDER — FUROSEMIDE 10 MG/ML
20 INJECTION INTRAMUSCULAR; INTRAVENOUS DAILY
Refills: 0 | Status: DISCONTINUED | OUTPATIENT
Start: 2025-07-02 | End: 2025-07-03

## 2025-07-02 RX ORDER — ENOXAPARIN SODIUM 100 MG/ML
40 INJECTION SUBCUTANEOUS EVERY 24 HOURS
Refills: 0 | Status: DISCONTINUED | OUTPATIENT
Start: 2025-07-02 | End: 2025-07-03

## 2025-07-02 RX ORDER — METHYLPREDNISOLONE ACETATE 80 MG/ML
1000 INJECTION, SUSPENSION INTRA-ARTICULAR; INTRALESIONAL; INTRAMUSCULAR; SOFT TISSUE EVERY 24 HOURS
Refills: 0 | Status: DISCONTINUED | OUTPATIENT
Start: 2025-07-02 | End: 2025-07-02

## 2025-07-02 RX ORDER — VENETOCLAX 10 MG/1
400 TABLET, FILM COATED ORAL
Refills: 0 | Status: DISCONTINUED | OUTPATIENT
Start: 2025-07-02 | End: 2025-07-03

## 2025-07-02 RX ADMIN — TAMSULOSIN HYDROCHLORIDE 0.4 MILLIGRAM(S): 0.4 CAPSULE ORAL at 22:13

## 2025-07-02 RX ADMIN — Medication 3 MILLILITER(S): at 06:15

## 2025-07-02 RX ADMIN — AMOXICILLIN AND CLAVULANATE POTASSIUM 1 TABLET(S): 500; 125 TABLET, FILM COATED ORAL at 06:17

## 2025-07-02 RX ADMIN — METOPROLOL SUCCINATE 25 MILLIGRAM(S): 50 TABLET, EXTENDED RELEASE ORAL at 06:17

## 2025-07-02 RX ADMIN — FUROSEMIDE 20 MILLIGRAM(S): 10 INJECTION INTRAMUSCULAR; INTRAVENOUS at 06:17

## 2025-07-02 RX ADMIN — Medication 500 MILLIGRAM(S): at 22:14

## 2025-07-02 RX ADMIN — Medication 3 MILLILITER(S): at 14:40

## 2025-07-02 RX ADMIN — PAROXETINE HYDROCHLORIDE 10 MILLIGRAM(S): 20 TABLET, FILM COATED ORAL at 22:14

## 2025-07-02 RX ADMIN — SODIUM CHLORIDE 70 MILLILITER(S): 9 INJECTION, SOLUTION INTRAVENOUS at 00:29

## 2025-07-02 RX ADMIN — FINASTERIDE 5 MILLIGRAM(S): 1 TABLET, FILM COATED ORAL at 22:14

## 2025-07-02 RX ADMIN — Medication 3 MILLILITER(S): at 22:15

## 2025-07-02 RX ADMIN — Medication 40 MILLIGRAM(S): at 06:17

## 2025-07-02 RX ADMIN — AMOXICILLIN AND CLAVULANATE POTASSIUM 1 TABLET(S): 500; 125 TABLET, FILM COATED ORAL at 22:14

## 2025-07-02 RX ADMIN — METHYLPREDNISOLONE ACETATE 60 MILLIGRAM(S): 80 INJECTION, SUSPENSION INTRA-ARTICULAR; INTRALESIONAL; INTRAMUSCULAR; SOFT TISSUE at 06:18

## 2025-07-02 RX ADMIN — Medication 1 TABLET(S): at 22:14

## 2025-07-02 NOTE — PRE-OP CHECKLIST - STERILIZATION AFFIRMATION
Secure chat message sent to provider pt to have scope noon or after   Per Dr. Gomez am po meds to be administered as ordered   Pt reports he is unable to take meds without being crushed or with crackers   Discussed each medication with pt some are unable to be crushed and is not able to eat due to upcoming procedure this afternoon   Medications that were able to be crushed were administered to pt   He states he will take others after his procedure    medicated pt with 4 mg of morphine im to right glute. also medicated with 
ativan for anxiety. will reassess once medication takes effect . n/a

## 2025-07-02 NOTE — PROGRESS NOTE ADULT - SUBJECTIVE AND OBJECTIVE BOX
VITAL SIGNS    Telemetry:  SR 50-60  Vital Signs Last 24 Hrs  T(C): 36 (07-02-25 @ 05:58), Max: 36.6 (07-01-25 @ 12:54)  T(F): 96.8 (07-02-25 @ 05:58), Max: 97.9 (07-01-25 @ 12:54)  HR: 64 (07-02-25 @ 05:58) (63 - 65)  BP: 110/61 (07-02-25 @ 05:58) (108/61 - 115/68)  RR: 18 (07-02-25 @ 05:58) (18 - 18)  SpO2: 98% (07-02-25 @ 05:58) (96% - 99%)            07-01 @ 07:01  -  07-02 @ 07:00  --------------------------------------------------------  IN: 1240 mL / OUT: 2300 mL / NET: -1060 mL    07-02 @ 07:01  -  07-02 @ 11:26  --------------------------------------------------------  IN: 0 mL / OUT: 450 mL / NET: -450 mL       Daily     Daily   Admit Wt: Drug Dosing Weight  Height (cm): 160 (27 Jun 2025 12:49)  Weight (kg): 68 (27 Jun 2025 12:49)  BMI (kg/m2): 26.6 (27 Jun 2025 12:49)  BSA (m2): 1.71 (27 Jun 2025 12:49)      CAPILLARY BLOOD GLUCOSE              MEDICATIONS  amoxicillin  875 milliGRAM(s)/clavulanate 1 Tablet(s) Oral every 12 hours  ascorbic acid 500 milliGRAM(s) Oral daily  dextrose 5% + sodium chloride 0.45%. 1000 milliLiter(s) IV Continuous <Continuous>  enoxaparin Injectable 40 milliGRAM(s) SubCutaneous every 24 hours  finasteride 5 milliGRAM(s) Oral daily  furosemide    Tablet 20 milliGRAM(s) Oral daily  methylPREDNISolone sodium succinate Injectable 60 milliGRAM(s) IV Push daily  metoprolol succinate ER 25 milliGRAM(s) Oral daily  multivitamin 1 Tablet(s) Oral daily  pantoprazole    Tablet 40 milliGRAM(s) Oral before breakfast  PARoxetine 10 milliGRAM(s) Oral daily  sodium chloride 0.9% lock flush 3 milliLiter(s) IV Push every 8 hours  tamsulosin 0.4 milliGRAM(s) Oral at bedtime      >>> <<<  PHYSICAL EXAM  Subjective: NAD  Neurology: alert and oriented x 3, nonfocal, no gross deficits  CV :s1s1  Lungs: CTA  Abdomen: soft, NT,ND, (- )BM  :  voiding  Extremities:  -c/c/e     LABS  07-02    140  |  102  |  33[H]  ----------------------------<  112[H]  3.9   |  26  |  0.97    Ca    8.7      02 Jul 2025 05:24  Phos  1.8     07-02  Mg     2.4     07-02                                   13.4   4.69  )-----------( 120      ( 02 Jul 2025 05:25 )             40.3          PT/INR - ( 02 Jul 2025 05:25 )   PT: 11.6 sec;   INR: 1.01 ratio         PTT - ( 02 Jul 2025 05:25 )  PTT:25.9 sec       PAST MEDICAL & SURGICAL HISTORY:  CLL (chronic lymphocytic leukemia)      History of BPH      H/O excision of mass  R hip

## 2025-07-02 NOTE — PROGRESS NOTE ADULT - SUBJECTIVE AND OBJECTIVE BOX
Patient is a 74y old  Male who presents with a chief complaint of Headache (30 Jun 2025 10:55)    Patient seen and examined at bedside, feels well. Bx planned for today.    MEDICATIONS  (STANDING):  amoxicillin  875 milliGRAM(s)/clavulanate 1 Tablet(s) Oral every 12 hours  ascorbic acid 500 milliGRAM(s) Oral daily  dextrose 5% + sodium chloride 0.45%. 1000 milliLiter(s) (70 mL/Hr) IV Continuous <Continuous>  enoxaparin Injectable 40 milliGRAM(s) SubCutaneous every 24 hours  finasteride 5 milliGRAM(s) Oral daily  furosemide    Tablet 20 milliGRAM(s) Oral daily  methylPREDNISolone sodium succinate Injectable 60 milliGRAM(s) IV Push daily  metoprolol succinate ER 25 milliGRAM(s) Oral daily  multivitamin 1 Tablet(s) Oral daily  pantoprazole    Tablet 40 milliGRAM(s) Oral before breakfast  PARoxetine 10 milliGRAM(s) Oral daily  sodium chloride 0.9% lock flush 3 milliLiter(s) IV Push every 8 hours  tamsulosin 0.4 milliGRAM(s) Oral at bedtime    MEDICATIONS  (PRN):    Vital Signs Last 24 Hrs  T(C): 36 (02 Jul 2025 05:58), Max: 36.6 (01 Jul 2025 12:54)  T(F): 96.8 (02 Jul 2025 05:58), Max: 97.9 (01 Jul 2025 12:54)  HR: 64 (02 Jul 2025 05:58) (63 - 66)  BP: 110/61 (02 Jul 2025 05:58) (108/61 - 120/67)  BP(mean): 77 (02 Jul 2025 05:58) (77 - 77)  RR: 18 (02 Jul 2025 05:58) (18 - 18)  SpO2: 98% (02 Jul 2025 05:58) (96% - 99%)    Parameters below as of 02 Jul 2025 05:58  Patient On (Oxygen Delivery Method): room air    PE  NAD  Awake, alert  Anicteric, MMM  RRR  CTAB  Abd soft, NT, ND  No c/c/e  No rash grossly                        13.4   4.69  )-----------( 120      ( 02 Jul 2025 05:25 )             40.3       07-02    140  |  102  |  33[H]  ----------------------------<  112[H]  3.9   |  26  |  0.97    Ca    8.7      02 Jul 2025 05:24  Phos  1.8     07-02  Mg     2.4     07-02

## 2025-07-02 NOTE — PROGRESS NOTE ADULT - SUBJECTIVE AND OBJECTIVE BOX
LATOSHA   SUBJECTIVE: Patient seen and examined at bedside with surgical team.    OBJECTIVE:    Vital Signs Last 24 Hrs  T(C): 36.4 (01 Jul 2025 20:01), Max: 36.7 (01 Jul 2025 05:47)  T(F): 97.5 (01 Jul 2025 20:01), Max: 98.1 (01 Jul 2025 05:47)  HR: 63 (01 Jul 2025 20:01) (60 - 66)  BP: 108/61 (01 Jul 2025 20:01) (108/61 - 120/67)  BP(mean): 81 (01 Jul 2025 05:47) (81 - 81)  RR: 18 (01 Jul 2025 20:01) (18 - 18)  SpO2: 99% (01 Jul 2025 20:01) (96% - 99%)    Parameters below as of 01 Jul 2025 20:01  Patient On (Oxygen Delivery Method): room air    I&O's Detail    30 Jun 2025 07:01  -  01 Jul 2025 07:00  --------------------------------------------------------  IN:    Oral Fluid: 440 mL  Total IN: 440 mL    OUT:    Voided (mL): 1550 mL  Total OUT: 1550 mL    Total NET: -1110 mL      01 Jul 2025 07:01  -  02 Jul 2025 04:42  --------------------------------------------------------  IN:    dextrose 5% + sodium chloride 0.45%: 140 mL    Oral Fluid: 680 mL  Total IN: 820 mL    OUT:    Voided (mL): 2300 mL  Total OUT: 2300 mL    Total NET: -1480 mL      MEDICATIONS  (STANDING):  amoxicillin  875 milliGRAM(s)/clavulanate 1 Tablet(s) Oral every 12 hours  ascorbic acid 500 milliGRAM(s) Oral daily  dextrose 5% + sodium chloride 0.45%. 1000 milliLiter(s) (70 mL/Hr) IV Continuous <Continuous>  enoxaparin Injectable 40 milliGRAM(s) SubCutaneous every 24 hours  finasteride 5 milliGRAM(s) Oral daily  furosemide    Tablet 20 milliGRAM(s) Oral daily  methylPREDNISolone sodium succinate Injectable 60 milliGRAM(s) IV Push daily  metoprolol succinate ER 25 milliGRAM(s) Oral daily  multivitamin 1 Tablet(s) Oral daily  pantoprazole    Tablet 40 milliGRAM(s) Oral before breakfast  PARoxetine 10 milliGRAM(s) Oral daily  sodium chloride 0.9% lock flush 3 milliLiter(s) IV Push every 8 hours  tamsulosin 0.4 milliGRAM(s) Oral at bedtime    MEDICATIONS  (PRN):      PHYSICAL EXAM:  Constitutional: A&Ox3, NAD  Respiratory: Unlabored breathing  Abdomen: Soft, nondistended, NTTP. No rebound or guarding.  Extremities: WWP, LANDON spontaneously    LABS:                        12.5   4.87  )-----------( 121      ( 01 Jul 2025 06:34 )             38.4     07-01    142  |  104  |  30[H]  ----------------------------<  107[H]  3.8   |  24  |  0.94    Ca    8.7      01 Jul 2025 06:37  Phos  1.4     07-01  Mg     2.2     07-01          Urinalysis Basic - ( 01 Jul 2025 06:37 )    Color: x / Appearance: x / SG: x / pH: x  Gluc: 107 mg/dL / Ketone: x  / Bili: x / Urobili: x   Blood: x / Protein: x / Nitrite: x   Leuk Esterase: x / RBC: x / WBC x   Sq Epi: x / Non Sq Epi: x / Bacteria: x

## 2025-07-02 NOTE — PROGRESS NOTE ADULT - PROBLEM SELECTOR PLAN 3
c/w home finasteride and tamsulosin

## 2025-07-02 NOTE — PRE-ANESTHESIA EVALUATION ADULT - NSPROPOSEDPROCEDFT_GEN_ALL_CORE
presents with recent discharge from [facility] with Back pain.  Not able to procure pain meds as prescribed now with pain as before.  unable to walk and bear weight onto left side. left temporal artery biopsy

## 2025-07-02 NOTE — PROGRESS NOTE ADULT - PROBLEM SELECTOR PROBLEM 1
Giant cell arteritis

## 2025-07-02 NOTE — PRE-ANESTHESIA EVALUATION ADULT - NSANTHPMHFT_GEN_ALL_CORE
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

## 2025-07-02 NOTE — PROGRESS NOTE ADULT - ASSESSMENT
74M PMH CLL, BPH, prior herpes keratitis L eye, R carotid stenosis from atherosclerosis presenting with L eye blurry vision. Rheumatology consulted to evaluate for GCA    #eval for GCA  -Hx of 1 week of intermittent L eye blurry vision, intermittent L temporal pain, fatigue. Denies jaw claudication, scalp tenderness, PMR symptoms  -Outpt optho exam reportedly with afferent pupillary defect and pallor of optic disc in L eye  -Inpt optho exam with improved color palettes and mild afferent pupillary defect  -ESR 31 (age adjusted normal), CRP elevated at 35  -US with b/l temporal artery thickening, R>L  -MR brain and orbits overall normal  -S/p solumedrol 1g qd 6/27-6/29, now on solumedrol 60mg qd    Although pt reports inconsistent L eye blurry vision and L side temporal pain, given APD on optho exam and elevated CRP, pt scores as high risk on GCA probability score. Planned for L temporal artery bx 7/2    Plan:  -After temporal artery biopsy, no objection to discharge from rheum perspective  -Can dc on prednisone 60mg qd, atovaquone for PCP ppx, pantoprazole  -Will arrange outpt rheum follow up to review bx and for further steroid taper    Note incomplete, please wait     74M PMH CLL, BPH, prior herpes keratitis L eye, R carotid stenosis from atherosclerosis presenting with L eye blurry vision. Rheumatology consulted to evaluate for GCA    #eval for GCA  -Hx of 1 week of intermittent L eye blurry vision, intermittent L temporal pain, fatigue. Denies jaw claudication, scalp tenderness, PMR symptoms  -Outpt optho exam reportedly with afferent pupillary defect and pallor of optic disc in L eye  -Inpt optho exam with improved color palettes and mild afferent pupillary defect  -ESR 31 (age adjusted normal), CRP elevated at 36  -US with b/l temporal artery thickening, R>L  -MR brain and orbits overall normal  -S/p solumedrol 1g qd 6/27-6/29, now on solumedrol 60mg qd    Although pt reports inconsistent L eye blurry vision and L side temporal pain, given APD on optho exam and elevated CRP, pt scores as high risk on GCA probability score. Planned for L temporal artery bx 7/2    Plan:  -After temporal artery biopsy, no objection to discharge from rheum perspective  -Can dc on prednisone 60mg qd X 2 weeks, then 50mg daily, atovaquone for PCP ppx, pantoprazole  -Will arrange outpt rheum follow up to review bx and for further steroid taper         74M PMH CLL, BPH, prior herpes keratitis L eye, R carotid stenosis from atherosclerosis presenting with L eye blurry vision. Rheumatology consulted to evaluate for GCA    #eval for GCA  -Hx of 1 week of intermittent L eye blurry vision, intermittent L temporal pain, fatigue. Denies jaw claudication, scalp tenderness, PMR symptoms  -Outpt optho exam reportedly with afferent pupillary defect and pallor of optic disc in L eye  -Inpt optho exam with improved color palettes and mild afferent pupillary defect  -ESR 32 (age adjusted normal), CRP elevated at 36  -US with b/l temporal artery thickening, R>L  -MR brain and orbits overall normal  -S/p solumedrol 1g qd 6/27-6/29, now on solumedrol 60mg qd    Although pt reports inconsistent L eye blurry vision and L side temporal pain, given APD on optho exam and elevated CRP, pt scores as high risk on GCA probability score. Planned for L temporal artery bx 7/2    Plan:  -After temporal artery biopsy, no objection to discharge from rheum perspective  -Can dc on prednisone 60mg qd X 2 weeks, then 50mg daily, atovaquone for PCP ppx, pantoprazole  -Will arrange outpt rheum follow up to review bx and for further steroid taper    Case discussed with Dr Marcelina Blake MD  Rheumatology Fellow

## 2025-07-02 NOTE — PRE-ANESTHESIA EVALUATION ADULT - NSANTHOSAYNRD_GEN_A_CORE
No. ANEL screening performed.  STOP BANG Legend: 0-2 = LOW Risk; 3-4 = INTERMEDIATE Risk; 5-8 = HIGH Risk

## 2025-07-02 NOTE — PROGRESS NOTE ADULT - SUBJECTIVE AND OBJECTIVE BOX
PAULAJOSEPH AQUINO  [id number]    INTERVAL HPI/OVERNIGHT EVENTS:    MEDICATIONS  (STANDING):  amoxicillin  875 milliGRAM(s)/clavulanate 1 Tablet(s) Oral every 12 hours  ascorbic acid 500 milliGRAM(s) Oral daily  dextrose 5% + sodium chloride 0.45%. 1000 milliLiter(s) (70 mL/Hr) IV Continuous <Continuous>  enoxaparin Injectable 40 milliGRAM(s) SubCutaneous every 24 hours  finasteride 5 milliGRAM(s) Oral daily  furosemide    Tablet 20 milliGRAM(s) Oral daily  methylPREDNISolone sodium succinate Injectable 60 milliGRAM(s) IV Push daily  metoprolol succinate ER 25 milliGRAM(s) Oral daily  multivitamin 1 Tablet(s) Oral daily  pantoprazole    Tablet 40 milliGRAM(s) Oral before breakfast  PARoxetine 10 milliGRAM(s) Oral daily  sodium chloride 0.9% lock flush 3 milliLiter(s) IV Push every 8 hours  tamsulosin 0.4 milliGRAM(s) Oral at bedtime    MEDICATIONS  (PRN):      Allergies    Bactrim (Unknown)    Intolerances        Review of Systems:   General: No fevers/chills, no fatigue  HEENT: No blurry vision, dysphagia, or odynophagia  CVS: No CP/palpitations  Resp: No SOB/wheezing  GI: No N/V/C/D/abdominal pain  MSK:   Skin: No new rashes  Neuro: No headaches      Vital Signs Last 24 Hrs  T(C): 36.6 (02 Jul 2025 12:00), Max: 36.6 (01 Jul 2025 12:54)  T(F): 97.9 (02 Jul 2025 12:00), Max: 97.9 (01 Jul 2025 12:54)  HR: 64 (02 Jul 2025 12:00) (63 - 65)  BP: 99/62 (02 Jul 2025 12:00) (99/62 - 115/68)  BP(mean): 77 (02 Jul 2025 05:58) (77 - 77)  RR: 18 (02 Jul 2025 12:00) (18 - 18)  SpO2: 95% (02 Jul 2025 12:00) (95% - 99%)    Parameters below as of 02 Jul 2025 12:00  Patient On (Oxygen Delivery Method): room air        Physical Exam:  General: NAD  HEENT: EOMI, MMM  Cardio: +S1/S2, RRR  Resp: CTA b/l  GI: +BS, soft, NT/ND  MSK: No synovitis  Neuro: AAOx3      LABS:                        13.4   4.69  )-----------( 120      ( 02 Jul 2025 05:25 )             40.3     07-02    140  |  102  |  33[H]  ----------------------------<  112[H]  3.9   |  26  |  0.97    Ca    8.7      02 Jul 2025 05:24  Phos  1.8     07-02  Mg     2.4     07-02      PT/INR - ( 02 Jul 2025 05:25 )   PT: 11.6 sec;   INR: 1.01 ratio         PTT - ( 02 Jul 2025 05:25 )  PTT:25.9 sec  Urinalysis Basic - ( 02 Jul 2025 05:24 )    Color: x / Appearance: x / SG: x / pH: x  Gluc: 112 mg/dL / Ketone: x  / Bili: x / Urobili: x   Blood: x / Protein: x / Nitrite: x   Leuk Esterase: x / RBC: x / WBC x   Sq Epi: x / Non Sq Epi: x / Bacteria: x          RADIOLOGY & ADDITIONAL TESTS:  < from: VA Duplex Face Temporal Artery (06.29.25 @ 13:20) >    IMPRESSION:    Thickened appearance of bilateral temporal arteries, right greater than   left, with mild surrounding soft tissue edema, indeterminate for   arteritis.    < end of copied text >    < from: MR Head No Cont (06.28.25 @ 19:02) >    MRI ORBITS:  No evidence of acute infectious/inflammatory process, within limits of   noncontrast technique.    Bilaterally symmetric optic nerve atrophy.  No evidence of optic nerve   swelling/edema.    MR ANGIOGRAPHY BRAIN:  No vessel occlusion, stenosis or aneurysm is identified about the [place]   of [place].    MR ANGIOGRAPHY NECK:    Patent cervical vasculature.    No significant carotid stenosis based on NASCET criteria.    No flow-limiting vertebral artery stenosis.  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    --- End of Report ---    < end of copied text >   PAULA AQUINO  [id number]    INTERVAL HPI/OVERNIGHT EVENTS: Denies HA, scalp tenderness, jaw claudication, vision improved, no PMR symptoms    MEDICATIONS  (STANDING):  amoxicillin  875 milliGRAM(s)/clavulanate 1 Tablet(s) Oral every 12 hours  ascorbic acid 500 milliGRAM(s) Oral daily  dextrose 5% + sodium chloride 0.45%. 1000 milliLiter(s) (70 mL/Hr) IV Continuous <Continuous>  enoxaparin Injectable 40 milliGRAM(s) SubCutaneous every 24 hours  finasteride 5 milliGRAM(s) Oral daily  furosemide    Tablet 20 milliGRAM(s) Oral daily  methylPREDNISolone sodium succinate Injectable 60 milliGRAM(s) IV Push daily  metoprolol succinate ER 25 milliGRAM(s) Oral daily  multivitamin 1 Tablet(s) Oral daily  pantoprazole    Tablet 40 milliGRAM(s) Oral before breakfast  PARoxetine 10 milliGRAM(s) Oral daily  sodium chloride 0.9% lock flush 3 milliLiter(s) IV Push every 8 hours  tamsulosin 0.4 milliGRAM(s) Oral at bedtime    MEDICATIONS  (PRN):      Allergies    Bactrim (Unknown)    Intolerances        Review of Systems:   as above      Vital Signs Last 24 Hrs  T(C): 36.6 (02 Jul 2025 12:00), Max: 36.6 (01 Jul 2025 12:54)  T(F): 97.9 (02 Jul 2025 12:00), Max: 97.9 (01 Jul 2025 12:54)  HR: 64 (02 Jul 2025 12:00) (63 - 65)  BP: 99/62 (02 Jul 2025 12:00) (99/62 - 115/68)  BP(mean): 77 (02 Jul 2025 05:58) (77 - 77)  RR: 18 (02 Jul 2025 12:00) (18 - 18)  SpO2: 95% (02 Jul 2025 12:00) (95% - 99%)    Parameters below as of 02 Jul 2025 12:00  Patient On (Oxygen Delivery Method): room air        Physical Exam:  General: NAD  HEENT: EOMI, MMM, +b/l temporal pulses  Cardio: +S1/S2, RRR  Resp: CTA b/l  GI: +BS, soft, NT/ND  MSK: No synovitis  Neuro: AAOx3      LABS:                        13.4   4.69  )-----------( 120      ( 02 Jul 2025 05:25 )             40.3     07-02    140  |  102  |  33[H]  ----------------------------<  112[H]  3.9   |  26  |  0.97    Ca    8.7      02 Jul 2025 05:24  Phos  1.8     07-02  Mg     2.4     07-02      PT/INR - ( 02 Jul 2025 05:25 )   PT: 11.6 sec;   INR: 1.01 ratio         PTT - ( 02 Jul 2025 05:25 )  PTT:25.9 sec  Urinalysis Basic - ( 02 Jul 2025 05:24 )    Color: x / Appearance: x / SG: x / pH: x  Gluc: 112 mg/dL / Ketone: x  / Bili: x / Urobili: x   Blood: x / Protein: x / Nitrite: x   Leuk Esterase: x / RBC: x / WBC x   Sq Epi: x / Non Sq Epi: x / Bacteria: x          RADIOLOGY & ADDITIONAL TESTS:  < from: VA Duplex Face Temporal Artery (06.29.25 @ 13:20) >    IMPRESSION:    Thickened appearance of bilateral temporal arteries, right greater than   left, with mild surrounding soft tissue edema, indeterminate for   arteritis.    < end of copied text >    < from: MR Head No Cont (06.28.25 @ 19:02) >    MRI ORBITS:  No evidence of acute infectious/inflammatory process, within limits of   noncontrast technique.    Bilaterally symmetric optic nerve atrophy.  No evidence of optic nerve   swelling/edema.    MR ANGIOGRAPHY BRAIN:  No vessel occlusion, stenosis or aneurysm is identified about the [place]   of [place].    MR ANGIOGRAPHY NECK:    Patent cervical vasculature.    No significant carotid stenosis based on NASCET criteria.    No flow-limiting vertebral artery stenosis.  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    --- End of Report ---    < end of copied text >

## 2025-07-02 NOTE — PROGRESS NOTE ADULT - ASSESSMENT
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 He was seen by ophthalmology who performed full exam and referred patient to ED for further work up.     CLL  - Completed C6 of [place] on 4/7/25. Remains on Venetoclax 400mg daily. [place] Osito [place] [facility] [place]   - Please hold Venetoclax on the day of planned biopsy along with the following day. May be resumed 2 days following biopsy (7/4).    Giant cell arteritis  - MRI brain/orbits and MRA head: No evidence of acute infarct, intracranial blood products, mass effect or midline shift. Possible purulent bacterial sinusitis.   - Duplex of temporal artery: Thickened appearance of bilateral temporal arteries, right greater than left, with mild surrounding soft tissue edema, indeterminate for arteritis.   - Rheum, opthalmology and vascular following. Continues IV steroids, pending temporal today.    Will continue to follow.    Santiago Broussard PA-C  Hematology/Oncology  [facility]  [phone] (office)  Evenings and weekends please call MD on call or office

## 2025-07-02 NOTE — PROGRESS NOTE ADULT - ASSESSMENT
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  Patient with reported history of left-sided headache and temporal tenderness over the 2 days.  He was seen by ophthalmology who performed full exam and referred patient to ED for further work up. Pt notes some blurry vision at appointment. He denies current HA, dizziness, chest pain, sob, abd pain, n/v/d, numbness or weakness seen in the ED by opthalmology and rheumatology. elevated CRP, needs IV steroids and MRI per Rheum so will be admitted for further management   6/28 VSS; RSR ; IV steroids as per rheum; ESR 32/ now 27--> continue to trend; vasc consulted for possible TAB; MRI brain/orbits and MRA head and neck non con pending for today  6/29 VSS hemeonc & medicine called for clearance.  Augmentin 875 bid for sinusitis  6/30  Temporal artery biopsy on hold until hemeonc input regarding chemo- place on hold for procedure or continue?  patient does not want to hold his chemo medication.    discharge planning- home when stable   7/1   [facility],   VSS  7/2  Left TAB today. Held Venetoclax today and the following day. May be resumed 2 days following biopsy (7/4).

## 2025-07-02 NOTE — PROGRESS NOTE ADULT - ASSESSMENT
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     Recommendations:  - OR today for Left TAB 7/2 - NPO at [facility], 4 am labs   - Consent in the chart   - Medical optimization appreciated  - Appreciate rheumatology recommendations  - heme/onc recommendations - no need to hold Venetoclax prior to the procedure.   - Remainder of care per primary    Vascular Surgery  [id number]

## 2025-07-02 NOTE — PRE-ANESTHESIA EVALUATION ADULT - NSATTENDATTESTRD_GEN_ALL_CORE
The patient has been re-examined and I agree with the above assessment or I updated with my findings. Expected Date Of Service: 01/21/2021 Performing Laboratory: [id number] Lab Facility: [id number] Billing Type: Third-Party Bill Bill For Surgical Tray: no

## 2025-07-03 ENCOUNTER — TRANSCRIPTION ENCOUNTER (OUTPATIENT)
Age: 75
End: 2025-07-03

## 2025-07-03 VITALS
OXYGEN SATURATION: 95 % | TEMPERATURE: 98 F | DIASTOLIC BLOOD PRESSURE: 78 MMHG | SYSTOLIC BLOOD PRESSURE: 126 MMHG | RESPIRATION RATE: 18 BRPM | HEART RATE: 78 BPM

## 2025-07-03 LAB
ANION GAP SERPL CALC-SCNC: 12 MMOL/L — SIGNIFICANT CHANGE UP (ref 5–17)
BUN SERPL-MCNC: 30 MG/DL — HIGH (ref 7–23)
CALCIUM SERPL-MCNC: 8.7 MG/DL — SIGNIFICANT CHANGE UP (ref 8.4–10.5)
CHLORIDE SERPL-SCNC: 102 MMOL/L — SIGNIFICANT CHANGE UP (ref 96–108)
CO2 SERPL-SCNC: 25 MMOL/L — SIGNIFICANT CHANGE UP (ref 22–31)
CREAT SERPL-MCNC: 0.88 MG/DL — SIGNIFICANT CHANGE UP (ref 0.5–1.3)
EGFR: 90 ML/MIN/1.73M2 — SIGNIFICANT CHANGE UP
EGFR: 90 ML/MIN/1.73M2 — SIGNIFICANT CHANGE UP
GLUCOSE SERPL-MCNC: 102 MG/DL — HIGH (ref 70–99)
HCT VFR BLD CALC: 39.1 % — SIGNIFICANT CHANGE UP (ref 39–50)
HGB BLD-MCNC: 12.6 G/DL — LOW (ref 13–17)
MAGNESIUM SERPL-MCNC: 2.5 MG/DL — SIGNIFICANT CHANGE UP (ref 1.6–2.6)
MCHC RBC-ENTMCNC: 29.2 PG — SIGNIFICANT CHANGE UP (ref 27–34)
MCHC RBC-ENTMCNC: 32.2 G/DL — SIGNIFICANT CHANGE UP (ref 32–36)
MCV RBC AUTO: 90.7 FL — SIGNIFICANT CHANGE UP (ref 80–100)
NRBC # BLD AUTO: 0 K/UL — SIGNIFICANT CHANGE UP (ref 0–0)
NRBC # FLD: 0 K/UL — SIGNIFICANT CHANGE UP (ref 0–0)
NRBC BLD AUTO-RTO: 0 /100 WBCS — SIGNIFICANT CHANGE UP (ref 0–0)
PHOSPHATE SERPL-MCNC: 3 MG/DL — SIGNIFICANT CHANGE UP (ref 2.5–4.5)
PLATELET # BLD AUTO: 118 K/UL — LOW (ref 150–400)
PMV BLD: 8.9 FL — SIGNIFICANT CHANGE UP (ref 7–13)
POTASSIUM SERPL-MCNC: 3.9 MMOL/L — SIGNIFICANT CHANGE UP (ref 3.5–5.3)
POTASSIUM SERPL-SCNC: 3.9 MMOL/L — SIGNIFICANT CHANGE UP (ref 3.5–5.3)
RBC # BLD: 4.31 M/UL — SIGNIFICANT CHANGE UP (ref 4.2–5.8)
RBC # FLD: 12.9 % — SIGNIFICANT CHANGE UP (ref 10.3–14.5)
SODIUM SERPL-SCNC: 139 MMOL/L — SIGNIFICANT CHANGE UP (ref 135–145)
WBC # BLD: 5.1 K/UL — SIGNIFICANT CHANGE UP (ref 3.8–10.5)
WBC # FLD AUTO: 5.1 K/UL — SIGNIFICANT CHANGE UP (ref 3.8–10.5)

## 2025-07-03 PROCEDURE — 96374 THER/PROPH/DIAG INJ IV PUSH: CPT

## 2025-07-03 PROCEDURE — 88313 SPECIAL STAINS GROUP 2: CPT

## 2025-07-03 PROCEDURE — 80048 BASIC METABOLIC PNL TOTAL CA: CPT

## 2025-07-03 PROCEDURE — 85610 PROTHROMBIN TIME: CPT

## 2025-07-03 PROCEDURE — C1889: CPT

## 2025-07-03 PROCEDURE — 81003 URINALYSIS AUTO W/O SCOPE: CPT

## 2025-07-03 PROCEDURE — 93005 ELECTROCARDIOGRAM TRACING: CPT

## 2025-07-03 PROCEDURE — 84100 ASSAY OF PHOSPHORUS: CPT

## 2025-07-03 PROCEDURE — 85730 THROMBOPLASTIN TIME PARTIAL: CPT

## 2025-07-03 PROCEDURE — 70540 MRI ORBIT/FACE/NECK W/O DYE: CPT

## 2025-07-03 PROCEDURE — 83735 ASSAY OF MAGNESIUM: CPT

## 2025-07-03 PROCEDURE — 80074 ACUTE HEPATITIS PANEL: CPT

## 2025-07-03 PROCEDURE — 97116 GAIT TRAINING THERAPY: CPT

## 2025-07-03 PROCEDURE — 0241U: CPT

## 2025-07-03 PROCEDURE — 86704 HEP B CORE ANTIBODY TOTAL: CPT

## 2025-07-03 PROCEDURE — 80053 COMPREHEN METABOLIC PANEL: CPT

## 2025-07-03 PROCEDURE — 70547 MR ANGIOGRAPHY NECK W/O DYE: CPT

## 2025-07-03 PROCEDURE — 70544 MR ANGIOGRAPHY HEAD W/O DYE: CPT

## 2025-07-03 PROCEDURE — 85652 RBC SED RATE AUTOMATED: CPT

## 2025-07-03 PROCEDURE — 86850 RBC ANTIBODY SCREEN: CPT

## 2025-07-03 PROCEDURE — 99285 EMERGENCY DEPT VISIT HI MDM: CPT

## 2025-07-03 PROCEDURE — 85027 COMPLETE CBC AUTOMATED: CPT

## 2025-07-03 PROCEDURE — 70551 MRI BRAIN STEM W/O DYE: CPT

## 2025-07-03 PROCEDURE — 97530 THERAPEUTIC ACTIVITIES: CPT

## 2025-07-03 PROCEDURE — 86901 BLOOD TYPING SEROLOGIC RH(D): CPT

## 2025-07-03 PROCEDURE — 36415 COLL VENOUS BLD VENIPUNCTURE: CPT

## 2025-07-03 PROCEDURE — 86140 C-REACTIVE PROTEIN: CPT

## 2025-07-03 PROCEDURE — 85025 COMPLETE CBC W/AUTO DIFF WBC: CPT

## 2025-07-03 PROCEDURE — 88305 TISSUE EXAM BY PATHOLOGIST: CPT

## 2025-07-03 PROCEDURE — 87637 SARSCOV2&INF A&B&RSV AMP PRB: CPT

## 2025-07-03 PROCEDURE — 86480 TB TEST CELL IMMUN MEASURE: CPT

## 2025-07-03 PROCEDURE — 97162 PT EVAL MOD COMPLEX 30 MIN: CPT

## 2025-07-03 PROCEDURE — 86900 BLOOD TYPING SEROLOGIC ABO: CPT

## 2025-07-03 PROCEDURE — 93998 UNLISTD NONINVAS VASC DX STD: CPT

## 2025-07-03 RX ORDER — METOPROLOL SUCCINATE 50 MG/1
1 TABLET, EXTENDED RELEASE ORAL
Qty: 30 | Refills: 0
Start: 2025-07-03 | End: 2025-08-01

## 2025-07-03 RX ORDER — ATOVAQUONE 750 MG/5ML
1500 SUSPENSION ORAL DAILY
Refills: 0 | Status: DISCONTINUED | OUTPATIENT
Start: 2025-07-03 | End: 2025-07-03

## 2025-07-03 RX ADMIN — Medication 1 TABLET(S): at 11:18

## 2025-07-03 RX ADMIN — ATOVAQUONE 1500 MILLIGRAM(S): 750 SUSPENSION ORAL at 13:26

## 2025-07-03 RX ADMIN — METHYLPREDNISOLONE ACETATE 60 MILLIGRAM(S): 80 INJECTION, SUSPENSION INTRA-ARTICULAR; INTRALESIONAL; INTRAMUSCULAR; SOFT TISSUE at 06:28

## 2025-07-03 RX ADMIN — METOPROLOL SUCCINATE 25 MILLIGRAM(S): 50 TABLET, EXTENDED RELEASE ORAL at 06:28

## 2025-07-03 RX ADMIN — FINASTERIDE 5 MILLIGRAM(S): 1 TABLET, FILM COATED ORAL at 11:19

## 2025-07-03 RX ADMIN — FUROSEMIDE 20 MILLIGRAM(S): 10 INJECTION INTRAMUSCULAR; INTRAVENOUS at 06:29

## 2025-07-03 RX ADMIN — ENOXAPARIN SODIUM 40 MILLIGRAM(S): 100 INJECTION SUBCUTANEOUS at 06:28

## 2025-07-03 RX ADMIN — Medication 3 MILLILITER(S): at 06:25

## 2025-07-03 RX ADMIN — Medication 40 MILLIGRAM(S): at 06:29

## 2025-07-03 RX ADMIN — AMOXICILLIN AND CLAVULANATE POTASSIUM 1 TABLET(S): 500; 125 TABLET, FILM COATED ORAL at 11:19

## 2025-07-03 RX ADMIN — Medication 3 MILLILITER(S): at 11:43

## 2025-07-03 RX ADMIN — Medication 500 MILLIGRAM(S): at 11:18

## 2025-07-03 RX ADMIN — PAROXETINE HYDROCHLORIDE 10 MILLIGRAM(S): 20 TABLET, FILM COATED ORAL at 11:19

## 2025-07-03 NOTE — CHART NOTE - NSCHARTNOTEFT_GEN_A_CORE
Post Operative Note  Patient: PAULA AQUINO 74y (1950) Male   MRN: [id number]  Location: [facility] 260 W1  Visit: 06-27-25 Inpatient  Date: 07-03-25 @ 02:38    Procedure: S/P temporal artery biopsy    Subjective: Patient seen and examined post operatively. Reports pain as controlled. Denies nausea, vomiting, fever, chills, chest pain, SOB, cough.      Objective:  Vitals: T(F): 97.6 (07-02-25 @ 21:23), Max: 97.9 (07-02-25 @ 12:00)  HR: 74 (07-02-25 @ 21:23)  BP: 133/81 (07-02-25 @ 21:23) (99/62 - 136/75)  RR: 18 (07-02-25 @ 21:23)  SpO2: 98% (07-02-25 @ 21:23)    Vent Settings:     Is & Os:  07-01-25 @ 07:01  -  07-02-25 @ 07:00  --------------------------------------------------------  IN: 1240 mL / OUT: 2300 mL / NET: -1060 mL    07-02-25 @ 07:01  -  07-03-25 @ 02:38  --------------------------------------------------------  IN: 200 mL / OUT: 1250 mL / NET: -1050 mL        Physical Examination:  General: NAD, resting comfortably in bed  Abdomen: softly distended, appropriately tender, surgical incisions are c/d/i. NGT/OGT*    Imaging:  No post-op imaging studies    Assessment:  74yMale patient S/P temporal artery biopsy for giant cell arteritis    Plan:  - IV Abx: amoxicillin  875 milliGRAM(s)/clavulanate  - Pain: PARoxetine  - Diet:   - IVF:  - [place]?  - Activity:  - DVT ppx: SCD's & enoxaparin Injectable 40 every 24 hours    Date/Time: 07-03-25 @ 02:38 Post Operative Note  Patient: PAULA AQUINO 74y (1950) Male   MRN: [id number]  Location: [facility] 260 W1  Visit: 06-27-25 Inpatient  Date: 07-03-25 @ 02:38    Procedure: S/P temporal artery biopsy    Subjective: Patient seen and examined post operatively. Reports pain as controlled. Denies nausea, vomiting, fever, chills, chest pain, SOB, cough.      Objective:  Vitals: T(F): 97.6 (07-02-25 @ 21:23), Max: 97.9 (07-02-25 @ 12:00)  HR: 74 (07-02-25 @ 21:23)  BP: 133/81 (07-02-25 @ 21:23) (99/62 - 136/75)  RR: 18 (07-02-25 @ 21:23)  SpO2: 98% (07-02-25 @ 21:23)    Vent Settings:     Is & Os:  07-01-25 @ 07:01  -  07-02-25 @ 07:00  --------------------------------------------------------  IN: 1240 mL / OUT: 2300 mL / NET: -1060 mL    07-02-25 @ 07:01  -  07-03-25 @ 02:38  --------------------------------------------------------  IN: 200 mL / OUT: 1250 mL / NET: -1050 mL        Physical Examination:  General: NAD, resting comfortably in bed  Abdomen: softly distended, appropriately tender, surgical incisions are c/d/i. NGT/OGT*    Imaging:  No post-op imaging studies    Assessment:  74yMale patient S/P temporal artery biopsy for giant cell arteritis    Plan:  - IV Abx: amoxicillin  875 milliGRAM(s)/clavulanate  - Pain: PARoxetine  - Diet: DASH/TLC  - IVF: D5 1/2 NS @70  - DVT ppx: SCD's & enoxaparin Injectable 40 every 24 hours    Date/Time: 07-03-25 @ 02:38 Post Operative Note  Patient: PAULA AQUINO 74y (1950) Male   MRN: [id number]  Location: [facility] 260 W1  Visit: 06-27-25 Inpatient  Date: 07-03-25 @ 02:38    Procedure: S/P temporal artery biopsy    Subjective: Patient seen and examined post operatively. Reports pain as controlled. Denies nausea, vomiting, fever, chills, chest pain, SOB, cough.      Objective:  Vitals: T(F): 97.6 (07-02-25 @ 21:23), Max: 97.9 (07-02-25 @ 12:00)  HR: 74 (07-02-25 @ 21:23)  BP: 133/81 (07-02-25 @ 21:23) (99/62 - 136/75)  RR: 18 (07-02-25 @ 21:23)  SpO2: 98% (07-02-25 @ 21:23)  Is & Os:  07-01-25 @ 07:01  -  07-02-25 @ 07:00  --------------------------------------------------------  IN: 1240 mL / OUT: 2300 mL / NET: -1060 mL    07-02-25 @ 07:01  -  07-03-25 @ 02:38  --------------------------------------------------------  IN: 200 mL / OUT: 1250 mL / NET: -1050 mL        Physical Examination:  General: NAD, resting comfortably in bed  Head: steri strip dressing over temples c/d/i    Imaging:  No post-op imaging studies    Assessment:  74yMale patient S/P temporal artery biopsy for giant cell arteritis    Plan:  - IV Abx: amoxicillin  875 milliGRAM(s)/clavulanate  - Pain: PARoxetine  - Diet: DASH/TLC  - IVF: D5 1/2 NS @70  - DVT ppx: SCD's & enoxaparin Injectable 40 every 24 hours    Date/Time: 07-03-25 @ 02:38

## 2025-07-03 NOTE — DISCHARGE NOTE PROVIDER - PROVIDER TOKENS
PROVIDER:[TOKEN:[[id number]],FOLLOWUP:[Routine]],PROVIDER:[TOKEN:[[id number]],FOLLOWUP:[Routine]] PROVIDER:[TOKEN:[[id number]],FOLLOWUP:[Routine]],PROVIDER:[TOKEN:[[id number]],SCHEDULEDAPPT:[07/14/2025],SCHEDULEDAPPTTIME:[11:40 AM]]

## 2025-07-03 NOTE — CHART NOTE - NSCHARTNOTESELECT_GEN_ALL_CORE
Rheumatology/Event Note
post-op check/Event Note
HEME ONC Risk Stratification/Event Note
Heme/Event Note

## 2025-07-03 NOTE — DISCHARGE NOTE PROVIDER - NSDCFUADDINST_GEN_ALL_CORE_FT
Continue with Venetoclax 400mg daily July 4th. [place] [facility]   Prednisone 60mg orally daily X 2 weeks, then 50mg orally daily   Prednisone taper under the direction of Rheumatology upon follow up.  Atovaquone 1500mg QD for prophylaxis of Pneumocystis pneumonia (PCP), a lung infection common in immunocompromised patients    Please call Dr Hewitt office for Rheumatology follow up appointment to review biopsy results and for further steroid taper.  Maintain steri strip dressing over temples keep clean and dry. Please follow up with Vascular surgery Dr Kenney Overton for suture removal in 11 days; July 14th.  Continue with Venetoclax 400mg daily July 4th. Follow up with Dr. Osito Liu at [facility].   Prednisone 60mg orally daily X 2 weeks, then 50mg orally daily   Prednisone taper under the direction of Rheumatology upon follow up.  Atovaquone 1500mg QD for prophylaxis of Pneumocystis pneumonia (PCP), a lung infection common in immunocompromised patients    Please call Dr Hewitt office for Rheumatology follow up appointment to review biopsy results and for further steroid taper.  Maintain steri strip dressing over temples keep clean and dry. Please follow up with Vascular surgery Dr Kenney Overton for suture removal in 11 days; July 14th at 11:40 am

## 2025-07-03 NOTE — PROGRESS NOTE ADULT - NS ATTEND AMEND GEN_ALL_CORE FT
hold venetoclax day of biopsy  and resume 1-2 days after biopsy
s/p devin bx. follow up with rheum.
pt can hold venetoclax on day and day after bx   will cont to follow

## 2025-07-03 NOTE — PROGRESS NOTE ADULT - ASSESSMENT
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     Recommendations:  - Patient recovering appropriately   - Follow up in 2 weeks with Dr. Hernandez   - Vascular surgery will sign off at this time, please reconsult as needed.     Vascular Surgery  [id number]

## 2025-07-03 NOTE — DISCHARGE NOTE NURSING/CASE MANAGEMENT/SOCIAL WORK - PATIENT PORTAL LINK FT
You can access the FollowMyHealth Patient Portal offered by [facility] by registering at the following website: [email or web address]. By joining [facility]’s FollowMyHealth portal, you will also be able to view your health information using other applications (apps) compatible with our system.

## 2025-07-03 NOTE — DISCHARGE NOTE PROVIDER - NSDCFUADDAPPT_GEN_ALL_CORE_FT
Venetoclax 400mg daily. Follows with Dr. Osito Liu at [facility] [place].   Hold Venetoclax until tomorrow, 7/4.  Prednisone 60mg qd X 2 weeks, then 50mg daily, atovaquone 1500mg QD for PCP ppx, pantoprazole  Outpt rheum follow up to review bx and for further steroid taper  Maintain steri strip dressing over temples c/d/i

## 2025-07-03 NOTE — PROGRESS NOTE ADULT - ASSESSMENT
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 He was seen by ophthalmology who performed full exam and referred patient to ED for further work up.     CLL  - Completed C6 of [place] on 4/7/25. Remains on Venetoclax 400mg daily. Follows with Dr. Osito Liu at [facility].   - Hold Venetoclax until tomorrow, 7/4.    Giant cell arteritis  - MRI brain/orbits and MRA head: No evidence of acute infarct, intracranial blood products, mass effect or midline shift. Possible purulent bacterial sinusitis.   - Duplex of temporal artery: Thickened appearance of bilateral temporal arteries, right greater than left, with mild surrounding soft tissue edema, indeterminate for arteritis.   - Rheum, opthalmology and vascular following. Continues IV steroids with plan for taper per rheum. S/p temporal biopsy 7/2.    Will continue to follow.    Santiago Broussard PA-C  Hematology/Oncology  [facility]  [phone] (office)  Evenings and weekends please call MD on call or office

## 2025-07-03 NOTE — DISCHARGE NOTE PROVIDER - NSDCMRMEDTOKEN_GEN_ALL_CORE_FT
finasteride 5 mg oral tablet: 1 tab(s) orally once a day  furosemide 20 mg oral tablet: 1 tab(s) orally once a day  metoprolol succinate 25 mg oral tablet, extended release: 1 tab(s) orally once a day  pantoprazole 40 mg oral delayed release tablet: 1 tab(s) orally once a day (before a meal)  PARoxetine 10 mg oral tablet: 1 tab(s) orally once a day  predniSONE 20 mg oral tablet: 3 tab(s) orally once a day  predniSONE 50 mg oral tablet: 1 tab(s) orally once a day  tamsulosin 0.4 mg oral capsule: 1 cap(s) orally once a day (at bedtime)   atovaquone 750 mg/5 mL oral suspension: 10 milliliter(s) orally once a day  finasteride 5 mg oral tablet: 1 tab(s) orally once a day  furosemide 20 mg oral tablet: 1 tab(s) orally once a day  metoprolol succinate 25 mg oral tablet, extended release: 1 tab(s) orally once a day  pantoprazole 40 mg oral delayed release tablet: 1 tab(s) orally once a day (before a meal)  PARoxetine 10 mg oral tablet: 1 tab(s) orally once a day  predniSONE 20 mg oral tablet: 3 tab(s) orally once a day  predniSONE 50 mg oral tablet: 1 tab(s) orally once a day  tamsulosin 0.4 mg oral capsule: 1 cap(s) orally once a day (at bedtime)

## 2025-07-03 NOTE — PROGRESS NOTE ADULT - SUBJECTIVE AND OBJECTIVE BOX
INTERVAL EVENTS: No acute events overnight.  SUBJECTIVE: Patient seen and examined at bedside with surgical team, patient without complaints. Denies fever, chills, CP, SOB nausea, vomiting, abdominal pain.    OBJECTIVE:    Vital Signs Last 24 Hrs  T(C): 36.6 (03 Jul 2025 05:36), Max: 36.6 (02 Jul 2025 12:00)  T(F): 97.9 (03 Jul 2025 05:36), Max: 97.9 (02 Jul 2025 12:00)  HR: 64 (03 Jul 2025 05:36) (63 - 74)  BP: 125/82 (03 Jul 2025 05:36) (99/62 - 136/75)  BP(mean): 96 (03 Jul 2025 05:36) (77 - 98)  RR: 18 (03 Jul 2025 05:36) (16 - 18)  SpO2: 95% (03 Jul 2025 05:36) (93% - 99%)    Parameters below as of 03 Jul 2025 05:36  Patient On (Oxygen Delivery Method): room air    I&O's Detail    02 Jul 2025 07:01  -  03 Jul 2025 07:00  --------------------------------------------------------  IN:    Oral Fluid: 400 mL  Total IN: 400 mL    OUT:    Voided (mL): 1550 mL  Total OUT: 1550 mL    Total NET: -1150 mL      03 Jul 2025 07:01  -  03 Jul 2025 11:15  --------------------------------------------------------  IN:    Oral Fluid: 240 mL  Total IN: 240 mL    OUT:  Total OUT: 0 mL    Total NET: 240 mL      MEDICATIONS  (STANDING):  amoxicillin  875 milliGRAM(s)/clavulanate 1 Tablet(s) Oral every 12 hours  ascorbic acid 500 milliGRAM(s) Oral daily  atovaquone  Suspension 1500 milliGRAM(s) Oral daily  dextrose 5% + sodium chloride 0.45%. 1000 milliLiter(s) (70 mL/Hr) IV Continuous <Continuous>  enoxaparin Injectable 40 milliGRAM(s) SubCutaneous every 24 hours  finasteride 5 milliGRAM(s) Oral daily  furosemide    Tablet 20 milliGRAM(s) Oral daily  methylPREDNISolone sodium succinate Injectable 60 milliGRAM(s) IV Push daily  metoprolol succinate ER 25 milliGRAM(s) Oral daily  multivitamin 1 Tablet(s) Oral daily  pantoprazole    Tablet 40 milliGRAM(s) Oral before breakfast  PARoxetine 10 milliGRAM(s) Oral daily  sodium chloride 0.9% lock flush 3 milliLiter(s) IV Push every 8 hours  tamsulosin 0.4 milliGRAM(s) Oral at bedtime    MEDICATIONS  (PRN):      PHYSICAL EXAM:  Constitutional: A&Ox3, NAD, left temporal dressing c/d/i.   Respiratory: Unlabored breathing  Abdomen: Soft, nondistended, NTTP. No rebound or guarding.  Extremities: WWP, LANDON spontaneously    LABS:                        12.6   5.10  )-----------( 118      ( 03 Jul 2025 05:59 )             39.1     07-03    139  |  102  |  30[H]  ----------------------------<  102[H]  3.9   |  25  |  0.88    Ca    8.7      03 Jul 2025 05:59  Phos  3.0     07-03  Mg     2.5     07-03      PT/INR - ( 02 Jul 2025 05:25 )   PT: 11.6 sec;   INR: 1.01 ratio         PTT - ( 02 Jul 2025 05:25 )  PTT:25.9 sec    Urinalysis Basic - ( 03 Jul 2025 05:59 )    Color: x / Appearance: x / SG: x / pH: x  Gluc: 102 mg/dL / Ketone: x  / Bili: x / Urobili: x   Blood: x / Protein: x / Nitrite: x   Leuk Esterase: x / RBC: x / WBC x   Sq Epi: x / Non Sq Epi: x / Bacteria: x

## 2025-07-03 NOTE — DISCHARGE NOTE PROVIDER - CARE PROVIDERS DIRECT ADDRESSES
[email or web address]

## 2025-07-03 NOTE — DISCHARGE NOTE PROVIDER - HOSPITAL COURSE
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  Patient with reported history of left-sided headache and temporal tenderness over the 2 days.  He was seen by ophthalmology who performed full exam and referred patient to ED for further work up. Pt notes some blurry vision at appointment. He denies current HA, dizziness, chest pain, sob, abd pain, n/v/d, numbness or weakness seen in the ED by opthalmology and rheumatology. elevated CRP, needs IV steroids and MRI per Rheum so will be admitted for further management   6/28 VSS; RSR ; IV steroids as per rheum; ESR 32/ now 27--> continue to trend; vasc consulted for possible TAB; MRI brain/orbits and MRA head and neck non con pending for today  6/29 VSS hemeonc & medicine called for clearance.  Augmentin 875 bid for sinusitis  6/30  [facility] on hold for procedure or continue?  patient does not want to hold his chemo medication.    discharge planning- home when stable   7/1   cleared by [facility] to take Cancer medications while undergoing Temporal BX,   VSS  7/2  Left TAB today. Held Venetoclax today and the following day. May be resumed 2 days following biopsy (7/4)  7/3 Temporal artery biopsy, resection of 3cm. of left temporal artery by Dr Hernandez, Pallavi   Venetoclax 400mg daily. Follows with Dr. Osito Liu at [facility] [place].   Hold Venetoclax until tomorrow, 7/4.  Prednisone 60mg qd X 2 weeks, then 50mg daily, atovaquone 1500mg QD for PCP ppx, pantoprazole  Outpt rheum follow up to review bx and for further steroid taper  Maintain steri strip dressing over temples c/d/i

## 2025-07-03 NOTE — PROGRESS NOTE ADULT - PROVIDER SPECIALTY LIST ADULT
Ophthalmology
Ophthalmology
Vascular Surgery
CT Surgery
Heme/Onc
Rheumatology
Vascular Surgery
Vascular Surgery
Heme/Onc
Heme/Onc
Vascular Surgery
CT Surgery

## 2025-07-03 NOTE — DISCHARGE NOTE PROVIDER - NSDCPNSUBOBJ_GEN_ALL_CORE
VITAL SIGNS    Telemetry:  SR 60  Vital Signs Last 24 Hrs  T(C): 36.6 (25 @ 05:36), Max: 36.6 (25 @ 12:00)  T(F): 97.9 (25 @ 05:36), Max: 97.9 (25 @ 12:00)  HR: 64 (25 @ 05:36) (63 - 74)  BP: 125/82 (25 @ 05:36) (99/62 - 136/75)  RR: 18 (25 @ 05:36) (16 - 18)  SpO2: 95% (25 @ 05:36) (93% - 99%)             @ 07:01  -   @ 07:00  --------------------------------------------------------  IN: 400 mL / OUT: 1550 mL / NET: -1150 mL     @ 07:01  -   @ 11:57  --------------------------------------------------------  IN: 240 mL / OUT: 0 mL / NET: 240 mL       Daily Height in cm: 160.02 (2025 17:47)    Daily Weight in k.5 (2025 08:10)  Admit Wt: Drug Dosing Weight  Height (cm): 160 (2025 17:47)  Weight (kg): 82.3 (2025 17:47)  BMI (kg/m2): 32.1 (2025 17:47)  BSA (m2): 1.86 (2025 17:47)      CAPILLARY BLOOD GLUCOSE              MEDICATIONS  amoxicillin  875 milliGRAM(s)/clavulanate 1 Tablet(s) Oral every 12 hours  ascorbic acid 500 milliGRAM(s) Oral daily  atovaquone  Suspension 1500 milliGRAM(s) Oral daily  dextrose 5% + sodium chloride 0.45%. 1000 milliLiter(s) IV Continuous <Continuous>  enoxaparin Injectable 40 milliGRAM(s) SubCutaneous every 24 hours  finasteride 5 milliGRAM(s) Oral daily  furosemide    Tablet 20 milliGRAM(s) Oral daily  methylPREDNISolone sodium succinate Injectable 60 milliGRAM(s) IV Push daily  metoprolol succinate ER 25 milliGRAM(s) Oral daily  multivitamin 1 Tablet(s) Oral daily  pantoprazole    Tablet 40 milliGRAM(s) Oral before breakfast  PARoxetine 10 milliGRAM(s) Oral daily  sodium chloride 0.9% lock flush 3 milliLiter(s) IV Push every 8 hours  tamsulosin 0.4 milliGRAM(s) Oral at bedtime      >>> <<<  PHYSICAL EXAM  Subjective: NAD  temporal steri strips, suture intact   Neurology: alert and oriented x 3, nonfocal, no gross deficits  CV :s1s2  Lungs:cta  Abdomen: soft, NT,ND, ( +)BM  :  voiding  Extremities:  -c/c/e     LABS  -    139  |  102  |  30[H]  ----------------------------<  102[H]  3.9   |  25  |  0.88    Ca    8.7      2025 05:59  Phos  3.0     07-03  Mg     2.5     -                                   12.6   5.10  )-----------( 118      ( 2025 05:59 )             39.1          PT/INR - ( 2025 05:25 )   PT: 11.6 sec;   INR: 1.01 ratio         PTT - ( 2025 05:25 )  PTT:25.9 sec       PAST MEDICAL & SURGICAL HISTORY:  CLL (chronic lymphocytic leukemia)      History of BPH      H/O excision of mass  R hip

## 2025-07-03 NOTE — DISCHARGE NOTE NURSING/CASE MANAGEMENT/SOCIAL WORK - FINANCIAL ASSISTANCE
[facility] provides services at a reduced cost to those who are determined to be eligible through [facility]’s financial assistance program. Information regarding [facility]’s financial assistance program can be found by going to [email or web address] or by calling [phone].

## 2025-07-03 NOTE — PROGRESS NOTE ADULT - SUBJECTIVE AND OBJECTIVE BOX
Patient is a 74y old  Male who presents with a chief complaint of CAD (02 Jul 2025 09:00)    Pt seen and examined at bedside, s/p biopsy. Feeling well.    MEDICATIONS  (STANDING):  amoxicillin  875 milliGRAM(s)/clavulanate 1 Tablet(s) Oral every 12 hours  ascorbic acid 500 milliGRAM(s) Oral daily  dextrose 5% + sodium chloride 0.45%. 1000 milliLiter(s) (70 mL/Hr) IV Continuous <Continuous>  enoxaparin Injectable 40 milliGRAM(s) SubCutaneous every 24 hours  finasteride 5 milliGRAM(s) Oral daily  furosemide    Tablet 20 milliGRAM(s) Oral daily  methylPREDNISolone sodium succinate Injectable 60 milliGRAM(s) IV Push daily  metoprolol succinate ER 25 milliGRAM(s) Oral daily  multivitamin 1 Tablet(s) Oral daily  pantoprazole    Tablet 40 milliGRAM(s) Oral before breakfast  PARoxetine 10 milliGRAM(s) Oral daily  sodium chloride 0.9% lock flush 3 milliLiter(s) IV Push every 8 hours  tamsulosin 0.4 milliGRAM(s) Oral at bedtime    MEDICATIONS  (PRN):    Vital Signs Last 24 Hrs  T(C): 36.6 (03 Jul 2025 05:36), Max: 36.6 (02 Jul 2025 12:00)  T(F): 97.9 (03 Jul 2025 05:36), Max: 97.9 (02 Jul 2025 12:00)  HR: 64 (03 Jul 2025 05:36) (63 - 74)  BP: 125/82 (03 Jul 2025 05:36) (99/62 - 136/75)  BP(mean): 96 (03 Jul 2025 05:36) (77 - 98)  RR: 18 (03 Jul 2025 05:36) (16 - 18)  SpO2: 95% (03 Jul 2025 05:36) (93% - 99%)    Parameters below as of 03 Jul 2025 05:36  Patient On (Oxygen Delivery Method): room air    PE  NAD  Awake, alert  Anicteric, MMM  RRR  CTAB  Abd soft, NT, ND  No c/c/e  No rash grossly                        12.6   5.10  )-----------( 118      ( 03 Jul 2025 05:59 )             39.1       07-03    139  |  102  |  30[H]  ----------------------------<  102[H]  3.9   |  25  |  0.88    Ca    8.7      03 Jul 2025 05:59  Phos  3.0     07-03  Mg     2.5     07-03

## 2025-07-03 NOTE — DISCHARGE NOTE NURSING/CASE MANAGEMENT/SOCIAL WORK - NSDCVIVACCINE_GEN_ALL_CORE_FT
COVID-19, mRNA, LNP-S, PF, 30 mcg/0.3 mL dose (Pfizer); 09-Oct-2021 14:21; Elena Mock (WANDA); [employer]; [id number] (Exp. Date: 06-Nov-2021); IntraMuscular; Deltoid Right.; 0.3 milliLiter(s);

## 2025-07-03 NOTE — DISCHARGE NOTE PROVIDER - NSDCCPCAREPLAN_GEN_ALL_CORE_FT
PRINCIPAL DISCHARGE DIAGNOSIS  Diagnosis: Temporal arteritis  Assessment and Plan of Treatment:       SECONDARY DISCHARGE DIAGNOSES  Diagnosis: Giant cell arteritis  Assessment and Plan of Treatment:

## 2025-07-03 NOTE — DISCHARGE NOTE PROVIDER - CARE PROVIDER_API CALL
Ranjeet Hewitt  Rheumatology  [address], [address]  [place] [address]  Phone: [phone]  Fax: [phone]  Follow Up Time: Routine    Manvar-Singh, Pallavi Buddhadev  Vascular Surgery  [address], [address]  [place] [address]  Phone: [phone]  Fax: [phone]  Follow Up Time: Routine   Ranjeet Hewitt  Rheumatology  [address], [address]  [place] [address]  Phone: [phone]  Fax: [phone]  Follow Up Time: Routine    Manvar-Singh, Pallavi Buddhadev  Vascular Surgery  [address], [address]  [place] [address]  Phone: [phone]  Fax: [phone]  Scheduled Appointment: 07/14/2025 11:40 AM

## 2025-07-04 RX ORDER — ATOVAQUONE 750 MG/5ML
10 SUSPENSION ORAL
Qty: 300 | Refills: 0
Start: 2025-07-04 | End: 2025-08-02

## 2025-07-04 RX ORDER — PREDNISONE 20 MG/1
3 TABLET ORAL
Qty: 42 | Refills: 0
Start: 2025-07-04 | End: 2025-07-17

## 2025-07-07 ENCOUNTER — LABORATORY RESULT (OUTPATIENT)
Age: 75
End: 2025-07-07

## 2025-07-07 ENCOUNTER — NON-APPOINTMENT (OUTPATIENT)
Age: 75
End: 2025-07-07

## 2025-07-07 ENCOUNTER — APPOINTMENT (OUTPATIENT)
Dept: HEART AND VASCULAR | Facility: CLINIC | Age: 75
End: 2025-07-07
Payer: MEDICARE

## 2025-07-07 VITALS
RESPIRATION RATE: 14 BRPM | HEART RATE: 77 BPM | HEIGHT: 62 IN | DIASTOLIC BLOOD PRESSURE: 82 MMHG | BODY MASS INDEX: 32.76 KG/M2 | SYSTOLIC BLOOD PRESSURE: 136 MMHG | WEIGHT: 178 LBS

## 2025-07-07 PROCEDURE — 99496 TRANSJ CARE MGMT HIGH F2F 7D: CPT

## 2025-07-07 PROCEDURE — 36415 COLL VENOUS BLD VENIPUNCTURE: CPT

## 2025-07-07 PROCEDURE — 93000 ELECTROCARDIOGRAM COMPLETE: CPT

## 2025-07-07 RX ORDER — FUROSEMIDE 20 MG/1
20 TABLET ORAL DAILY
Qty: 90 | Refills: 3 | Status: ACTIVE | COMMUNITY
Start: 2025-07-07 | End: 1900-01-01

## 2025-07-08 LAB
ALBUMIN SERPL ELPH-MCNC: 4.3 G/DL
ALP BLD-CCNC: 64 U/L
ALT SERPL-CCNC: 27 U/L
ANION GAP SERPL CALC-SCNC: 18 MMOL/L
AST SERPL-CCNC: 13 U/L
BASOPHILS # BLD AUTO: 0 K/UL
BASOPHILS NFR BLD AUTO: 0 %
BILIRUB SERPL-MCNC: 0.4 MG/DL
BUN SERPL-MCNC: 29 MG/DL
CALCIUM SERPL-MCNC: 9.3 MG/DL
CHLORIDE SERPL-SCNC: 100 MMOL/L
CO2 SERPL-SCNC: 20 MMOL/L
CREAT SERPL-MCNC: 0.93 MG/DL
CRP SERPL-MCNC: <3 MG/L
EGFRCR SERPLBLD CKD-EPI 2021: 86 ML/MIN/1.73M2
EOSINOPHIL # BLD AUTO: 0 K/UL
EOSINOPHIL NFR BLD AUTO: 0 %
ERYTHROCYTE [SEDIMENTATION RATE] IN BLOOD BY WESTERGREN METHOD: 2 MM/HR
GLUCOSE SERPL-MCNC: 279 MG/DL
HCT VFR BLD CALC: 44.7 %
HGB BLD-MCNC: 14.3 G/DL
LYMPHOCYTES # BLD AUTO: 0.21 K/UL
LYMPHOCYTES NFR BLD AUTO: 1.7 %
MAN DIFF?: NORMAL
MCHC RBC-ENTMCNC: 30.8 PG
MCHC RBC-ENTMCNC: 32 G/DL
MCV RBC AUTO: 96.1 FL
MONOCYTES # BLD AUTO: 0.11 K/UL
MONOCYTES NFR BLD AUTO: 0.9 %
NEUTROPHILS # BLD AUTO: 11.48 K/UL
NEUTROPHILS NFR BLD AUTO: 94.8 %
PLATELET # BLD AUTO: 125 K/UL
POTASSIUM SERPL-SCNC: 4.2 MMOL/L
PROT SERPL-MCNC: 6.1 G/DL
RBC # BLD: 4.65 M/UL
RBC # FLD: 14.3 %
SODIUM SERPL-SCNC: 138 MMOL/L
SURGICAL PATHOLOGY STUDY: SIGNIFICANT CHANGE UP
WBC # FLD AUTO: 12.11 K/UL

## 2025-07-14 ENCOUNTER — APPOINTMENT (OUTPATIENT)
Dept: VASCULAR SURGERY | Facility: CLINIC | Age: 75
End: 2025-07-14

## 2025-07-14 VITALS
WEIGHT: 178 LBS | DIASTOLIC BLOOD PRESSURE: 78 MMHG | HEART RATE: 73 BPM | HEIGHT: 62 IN | SYSTOLIC BLOOD PRESSURE: 124 MMHG | BODY MASS INDEX: 32.76 KG/M2

## 2025-07-15 ENCOUNTER — APPOINTMENT (OUTPATIENT)
Dept: OPHTHALMOLOGY | Facility: CLINIC | Age: 75
End: 2025-07-15
Payer: MEDICARE

## 2025-07-15 ENCOUNTER — NON-APPOINTMENT (OUTPATIENT)
Age: 75
End: 2025-07-15

## 2025-07-15 PROCEDURE — 99214 OFFICE O/P EST MOD 30 MIN: CPT

## 2025-07-15 PROCEDURE — 92133 CPTRZD OPH DX IMG PST SGM ON: CPT

## 2025-07-15 PROCEDURE — 92083 EXTENDED VISUAL FIELD XM: CPT

## 2025-07-17 ENCOUNTER — APPOINTMENT (OUTPATIENT)
Dept: RHEUMATOLOGY | Facility: CLINIC | Age: 75
End: 2025-07-17
Payer: MEDICARE

## 2025-07-17 VITALS
WEIGHT: 174 LBS | HEIGHT: 62 IN | DIASTOLIC BLOOD PRESSURE: 76 MMHG | SYSTOLIC BLOOD PRESSURE: 122 MMHG | RESPIRATION RATE: 16 BRPM | OXYGEN SATURATION: 96 % | HEART RATE: 76 BPM | BODY MASS INDEX: 32.02 KG/M2

## 2025-07-17 PROBLEM — M31.6 TEMPORAL ARTERITIS: Status: ACTIVE | Noted: 2025-07-07

## 2025-07-17 PROBLEM — Z79.899 HIGH RISK MEDICATION USE: Status: ACTIVE | Noted: 2025-07-17

## 2025-07-17 PROCEDURE — 99214 OFFICE O/P EST MOD 30 MIN: CPT

## 2025-07-17 RX ORDER — PREDNISONE 10 MG/1
10 TABLET ORAL
Qty: 100 | Refills: 2 | Status: ACTIVE | COMMUNITY
Start: 2025-07-17 | End: 1900-01-01

## 2025-07-17 RX ORDER — ATOVAQUONE 750 MG/5ML
750 SUSPENSION ORAL TWICE DAILY
Qty: 1 | Refills: 1 | Status: ACTIVE | COMMUNITY
Start: 2025-07-17 | End: 1900-01-01

## 2025-07-19 RX ORDER — PREDNISONE 20 MG/1
1 TABLET ORAL
Qty: 30 | Refills: 0
Start: 2025-07-19 | End: 2025-08-17

## 2025-07-28 ENCOUNTER — APPOINTMENT (OUTPATIENT)
Dept: HEART AND VASCULAR | Facility: CLINIC | Age: 75
End: 2025-07-28

## 2025-08-04 ENCOUNTER — RX RENEWAL (OUTPATIENT)
Age: 75
End: 2025-08-04

## 2025-08-21 ENCOUNTER — APPOINTMENT (OUTPATIENT)
Dept: RHEUMATOLOGY | Facility: CLINIC | Age: 75
End: 2025-08-21

## 2025-08-26 ENCOUNTER — APPOINTMENT (OUTPATIENT)
Dept: HEART AND VASCULAR | Facility: CLINIC | Age: 75
End: 2025-08-26
Payer: MEDICARE

## 2025-08-26 ENCOUNTER — LABORATORY RESULT (OUTPATIENT)
Age: 75
End: 2025-08-26

## 2025-08-26 ENCOUNTER — NON-APPOINTMENT (OUTPATIENT)
Age: 75
End: 2025-08-26

## 2025-08-26 VITALS
HEART RATE: 73 BPM | DIASTOLIC BLOOD PRESSURE: 74 MMHG | RESPIRATION RATE: 14 BRPM | HEIGHT: 62 IN | WEIGHT: 178 LBS | SYSTOLIC BLOOD PRESSURE: 114 MMHG | BODY MASS INDEX: 32.76 KG/M2

## 2025-08-26 DIAGNOSIS — I34.0 NONRHEUMATIC MITRAL (VALVE) INSUFFICIENCY: ICD-10-CM

## 2025-08-26 DIAGNOSIS — Z01.810 ENCOUNTER FOR PREPROCEDURAL CARDIOVASCULAR EXAMINATION: ICD-10-CM

## 2025-08-26 PROCEDURE — 36415 COLL VENOUS BLD VENIPUNCTURE: CPT

## 2025-08-26 PROCEDURE — 93000 ELECTROCARDIOGRAM COMPLETE: CPT

## 2025-08-26 PROCEDURE — 99214 OFFICE O/P EST MOD 30 MIN: CPT

## 2025-08-26 PROCEDURE — G2211 COMPLEX E/M VISIT ADD ON: CPT

## 2025-08-27 LAB
ALBUMIN SERPL ELPH-MCNC: 4.7 G/DL
ALP BLD-CCNC: 76 U/L
ALT SERPL-CCNC: 17 U/L
ANION GAP SERPL CALC-SCNC: 16 MMOL/L
APTT BLD: 33.1 SEC
AST SERPL-CCNC: 16 U/L
BASOPHILS # BLD AUTO: 0 K/UL
BASOPHILS NFR BLD AUTO: 0 %
BILIRUB SERPL-MCNC: 0.5 MG/DL
BUN SERPL-MCNC: 14 MG/DL
CALCIUM SERPL-MCNC: 9.7 MG/DL
CHLORIDE SERPL-SCNC: 103 MMOL/L
CO2 SERPL-SCNC: 26 MMOL/L
CREAT SERPL-MCNC: 0.89 MG/DL
EGFRCR SERPLBLD CKD-EPI 2021: 89 ML/MIN/1.73M2
EOSINOPHIL # BLD AUTO: 0 K/UL
EOSINOPHIL NFR BLD AUTO: 0 %
GLUCOSE SERPL-MCNC: 128 MG/DL
HCT VFR BLD CALC: 37.4 %
HGB BLD-MCNC: 12 G/DL
INR PPP: 0.94 RATIO
LYMPHOCYTES # BLD AUTO: 0.82 K/UL
LYMPHOCYTES NFR BLD AUTO: 21.2 %
MAN DIFF?: NORMAL
MCHC RBC-ENTMCNC: 29.7 PG
MCHC RBC-ENTMCNC: 32.1 G/DL
MCV RBC AUTO: 92.6 FL
MONOCYTES # BLD AUTO: 0.35 K/UL
MONOCYTES NFR BLD AUTO: 9.1 %
NEUTROPHILS # BLD AUTO: 2.7 K/UL
NEUTROPHILS NFR BLD AUTO: 55.6 %
PLATELET # BLD AUTO: 156 K/UL
POTASSIUM SERPL-SCNC: 3.4 MMOL/L
PROT SERPL-MCNC: 6.3 G/DL
PT BLD: 10.9 SEC
RBC # BLD: 4.04 M/UL
RBC # FLD: 16.2 %
SODIUM SERPL-SCNC: 145 MMOL/L
WBC # FLD AUTO: 3.88 K/UL

## 2025-09-09 ENCOUNTER — APPOINTMENT (OUTPATIENT)
Dept: PULMONOLOGY | Facility: CLINIC | Age: 75
End: 2025-09-09

## (undated) DEVICE — PACK GENERAL MINOR

## (undated) DEVICE — DRSG STERISTRIPS 0.5 X 4"

## (undated) DEVICE — SPECIMEN CONTAINER 100ML

## (undated) DEVICE — STAPLER SKIN VISI-STAT 35 WIDE

## (undated) DEVICE — DRAPE MINOR PROCEDURE

## (undated) DEVICE — DRAPE 1/2 SHEET 40X57"

## (undated) DEVICE — BLADE SCALPEL SAFETYLOCK #10

## (undated) DEVICE — SUT BIOSYN 4-0 18" P-12

## (undated) DEVICE — BLADE SCALPEL SAFETYLOCK #15

## (undated) DEVICE — BLADE SCALPEL SAFETYLOCK #11

## (undated) DEVICE — DRSG OPSITE 13.75 X 4"

## (undated) DEVICE — PREP BETADINE KIT

## (undated) DEVICE — SYR LUER LOK 10CC

## (undated) DEVICE — SOL IRR POUR H2O 250ML

## (undated) DEVICE — MARKING PEN W RULER

## (undated) DEVICE — DRAPE INSTRUMENT POUCH 6.75" X 11"

## (undated) DEVICE — GLV 7.5 PROTEXIS (WHITE)

## (undated) DEVICE — SOL IRR POUR NS 0.9% 500ML

## (undated) DEVICE — DRSG TEGADERM 6 X 8"

## (undated) DEVICE — SUCTION YANKAUER NO CONTROL VENT

## (undated) DEVICE — DRAPE TOWEL BLUE 17" X 24"

## (undated) DEVICE — DRAPE MAGNETIC INSTRUMENT MEDIUM

## (undated) DEVICE — WARMING BLANKET LOWER ADULT

## (undated) DEVICE — SUT SOFSILK 4-0 18" TIES

## (undated) DEVICE — VENODYNE/SCD SLEEVE CALF MEDIUM

## (undated) DEVICE — DRAPE MAYO STAND 30"

## (undated) DEVICE — POSITIONER FOAM EGG CRATE ULNAR 2PCS (PINK)

## (undated) DEVICE — MEDICATION LABELS W MARKER

## (undated) DEVICE — DRAPE 3/4 SHEET W REINFORCEMENT 56X77"

## (undated) DEVICE — SUT POLYSORB 3-0 30" V-20 UNDYED

## (undated) DEVICE — SUT SOFSILK 3-0 30" TIES

## (undated) DEVICE — DRAPE LIGHT HANDLE COVER (BLUE)